# Patient Record
Sex: FEMALE | Race: WHITE | Employment: UNEMPLOYED | URBAN - METROPOLITAN AREA
[De-identification: names, ages, dates, MRNs, and addresses within clinical notes are randomized per-mention and may not be internally consistent; named-entity substitution may affect disease eponyms.]

---

## 2017-05-23 ENCOUNTER — TELEPHONE (OUTPATIENT)
Dept: MIDWIFE SERVICES | Facility: CLINIC | Age: 32
End: 2017-05-23

## 2017-05-23 DIAGNOSIS — O09.30 LATE PRENATAL CARE: Primary | ICD-10-CM

## 2017-05-23 NOTE — TELEPHONE ENCOUNTER
I think that if we can get her in for a fetal survey at our clinic before she is 22wks she can have a ultrasound with us. If there is no availability she should be referred to [facility] for a survey. Yes to the 45 min appointment with the CNM.  robb

## 2017-05-23 NOTE — TELEPHONE ENCOUNTER
Patient called [facility] to set up OB care, but was referred to our clinic as she is late prenatal care. Thinks her LMP was 1/1/17 - 20w1d. Would you like her to do an US at [facility] for late prenatal care? Then 45 minute appointment with midwife?  Ankita Brennan

## 2017-05-24 ENCOUNTER — RADIANT APPOINTMENT (OUTPATIENT)
Dept: ULTRASOUND IMAGING | Facility: CLINIC | Age: 32
End: 2017-05-24
Attending: ADVANCED PRACTICE MIDWIFE
Payer: MEDICAID

## 2017-05-24 DIAGNOSIS — O09.30 LATE PRENATAL CARE: ICD-10-CM

## 2017-05-24 PROCEDURE — 76815 OB US LIMITED FETUS(S): CPT

## 2017-05-25 ENCOUNTER — PRENATAL OFFICE VISIT (OUTPATIENT)
Dept: MIDWIFE SERVICES | Facility: CLINIC | Age: 32
End: 2017-05-25
Payer: MEDICAID

## 2017-05-25 VITALS
OXYGEN SATURATION: 99 % | WEIGHT: 135 LBS | HEART RATE: 111 BPM | DIASTOLIC BLOOD PRESSURE: 76 MMHG | SYSTOLIC BLOOD PRESSURE: 112 MMHG

## 2017-05-25 DIAGNOSIS — Z11.3 SCREENING EXAMINATION FOR VENEREAL DISEASE: Primary | ICD-10-CM

## 2017-05-25 DIAGNOSIS — B00.9 HSV (HERPES SIMPLEX VIRUS) INFECTION: ICD-10-CM

## 2017-05-25 DIAGNOSIS — Z86.32 HISTORY OF GESTATIONAL DIABETES MELLITUS (GDM): ICD-10-CM

## 2017-05-25 DIAGNOSIS — N76.0 BV (BACTERIAL VAGINOSIS): ICD-10-CM

## 2017-05-25 DIAGNOSIS — R30.0 DYSURIA: ICD-10-CM

## 2017-05-25 DIAGNOSIS — F19.21 COMBINATIONS OF DRUG DEPENDENCE EXCLUDING OPIOID TYPE DRUG, IN REMISSION (H): ICD-10-CM

## 2017-05-25 DIAGNOSIS — L30.9 ECZEMA, UNSPECIFIED TYPE: ICD-10-CM

## 2017-05-25 DIAGNOSIS — N89.8 VAGINAL DISCHARGE: ICD-10-CM

## 2017-05-25 DIAGNOSIS — Z34.82 OTHER NORMAL PREGNANCY, NOT FIRST, SECOND TRIMESTER: ICD-10-CM

## 2017-05-25 DIAGNOSIS — Z87.898 HISTORY OF SUBSTANCE USE: ICD-10-CM

## 2017-05-25 DIAGNOSIS — F99 CHRONIC MENTAL ILLNESS: ICD-10-CM

## 2017-05-25 DIAGNOSIS — B96.89 BV (BACTERIAL VAGINOSIS): ICD-10-CM

## 2017-05-25 PROBLEM — Z34.80 OTHER NORMAL PREGNANCY, NOT FIRST: Status: ACTIVE | Noted: 2017-05-25

## 2017-05-25 LAB
ABO + RH BLD: NORMAL
ABO + RH BLD: NORMAL
BLD GP AB SCN SERPL QL: NORMAL
BLOOD BANK CMNT PATIENT-IMP: NORMAL
ERYTHROCYTE [DISTWIDTH] IN BLOOD BY AUTOMATED COUNT: 12.8 % (ref 10–15)
HBA1C MFR BLD: 5.3 % (ref 4.3–6)
HCT VFR BLD AUTO: 38.7 % (ref 35–47)
HGB BLD-MCNC: 13.3 G/DL (ref 11.7–15.7)
MCH RBC QN AUTO: 29.9 PG (ref 26.5–33)
MCHC RBC AUTO-ENTMCNC: 34.4 G/DL (ref 31.5–36.5)
MCV RBC AUTO: 87 FL (ref 78–100)
MICRO REPORT STATUS: ABNORMAL
PLATELET # BLD AUTO: 305 10E9/L (ref 150–450)
RBC # BLD AUTO: 4.45 10E12/L (ref 3.8–5.2)
SPECIMEN EXP DATE BLD: NORMAL
SPECIMEN SOURCE: ABNORMAL
WBC # BLD AUTO: 13.9 10E9/L (ref 4–11)
WET PREP SPEC: ABNORMAL

## 2017-05-25 PROCEDURE — 83036 HEMOGLOBIN GLYCOSYLATED A1C: CPT | Performed by: ADVANCED PRACTICE MIDWIFE

## 2017-05-25 PROCEDURE — 86803 HEPATITIS C AB TEST: CPT | Performed by: ADVANCED PRACTICE MIDWIFE

## 2017-05-25 PROCEDURE — 86901 BLOOD TYPING SEROLOGIC RH(D): CPT | Performed by: ADVANCED PRACTICE MIDWIFE

## 2017-05-25 PROCEDURE — 86780 TREPONEMA PALLIDUM: CPT | Performed by: ADVANCED PRACTICE MIDWIFE

## 2017-05-25 PROCEDURE — 86592 SYPHILIS TEST NON-TREP QUAL: CPT | Performed by: ADVANCED PRACTICE MIDWIFE

## 2017-05-25 PROCEDURE — 87340 HEPATITIS B SURFACE AG IA: CPT | Performed by: ADVANCED PRACTICE MIDWIFE

## 2017-05-25 PROCEDURE — 86850 RBC ANTIBODY SCREEN: CPT | Performed by: ADVANCED PRACTICE MIDWIFE

## 2017-05-25 PROCEDURE — 85027 COMPLETE CBC AUTOMATED: CPT | Performed by: ADVANCED PRACTICE MIDWIFE

## 2017-05-25 PROCEDURE — 87086 URINE CULTURE/COLONY COUNT: CPT | Performed by: ADVANCED PRACTICE MIDWIFE

## 2017-05-25 PROCEDURE — 86593 SYPHILIS TEST NON-TREP QUANT: CPT | Performed by: ADVANCED PRACTICE MIDWIFE

## 2017-05-25 PROCEDURE — 36415 COLL VENOUS BLD VENIPUNCTURE: CPT | Performed by: ADVANCED PRACTICE MIDWIFE

## 2017-05-25 PROCEDURE — 86762 RUBELLA ANTIBODY: CPT | Performed by: ADVANCED PRACTICE MIDWIFE

## 2017-05-25 PROCEDURE — 99207 ZZC FIRST OB VISIT: CPT | Performed by: ADVANCED PRACTICE MIDWIFE

## 2017-05-25 PROCEDURE — 86900 BLOOD TYPING SEROLOGIC ABO: CPT | Performed by: ADVANCED PRACTICE MIDWIFE

## 2017-05-25 PROCEDURE — 81511 FTL CGEN ABNOR FOUR ANAL: CPT | Mod: 90 | Performed by: ADVANCED PRACTICE MIDWIFE

## 2017-05-25 PROCEDURE — 99000 SPECIMEN HANDLING OFFICE-LAB: CPT | Performed by: ADVANCED PRACTICE MIDWIFE

## 2017-05-25 PROCEDURE — 87491 CHLMYD TRACH DNA AMP PROBE: CPT | Performed by: ADVANCED PRACTICE MIDWIFE

## 2017-05-25 PROCEDURE — 87389 HIV-1 AG W/HIV-1&-2 AB AG IA: CPT | Performed by: ADVANCED PRACTICE MIDWIFE

## 2017-05-25 PROCEDURE — 87591 N.GONORRHOEAE DNA AMP PROB: CPT | Performed by: ADVANCED PRACTICE MIDWIFE

## 2017-05-25 PROCEDURE — 87186 SC STD MICRODIL/AGAR DIL: CPT | Performed by: ADVANCED PRACTICE MIDWIFE

## 2017-05-25 PROCEDURE — 87088 URINE BACTERIA CULTURE: CPT | Performed by: ADVANCED PRACTICE MIDWIFE

## 2017-05-25 PROCEDURE — 87210 SMEAR WET MOUNT SALINE/INK: CPT | Performed by: ADVANCED PRACTICE MIDWIFE

## 2017-05-25 PROCEDURE — 86706 HEP B SURFACE ANTIBODY: CPT | Performed by: ADVANCED PRACTICE MIDWIFE

## 2017-05-25 RX ORDER — VALACYCLOVIR HYDROCHLORIDE 500 MG/1
500 TABLET, FILM COATED ORAL 2 TIMES DAILY
Qty: 6 TABLET | Refills: 3 | Status: SHIPPED | OUTPATIENT
Start: 2017-05-25 | End: 2017-10-11

## 2017-05-25 RX ORDER — PRENATAL VIT/IRON FUM/FOLIC AC 27MG-0.8MG
1 TABLET ORAL DAILY
Qty: 100 TABLET | Refills: 3 | Status: SHIPPED | OUTPATIENT
Start: 2017-05-25 | End: 2017-09-22

## 2017-05-25 RX ORDER — METRONIDAZOLE 500 MG/1
500 TABLET ORAL 2 TIMES DAILY
Qty: 14 TABLET | Refills: 0 | Status: ON HOLD | OUTPATIENT
Start: 2017-05-25 | End: 2017-10-25

## 2017-05-25 RX ORDER — TRIAMCINOLONE ACETONIDE 1 MG/G
CREAM TOPICAL
Qty: 80 G | Refills: 0 | Status: SHIPPED | OUTPATIENT
Start: 2017-05-25 | End: 2017-09-22

## 2017-05-25 NOTE — PROGRESS NOTES
Can you call patient and let her know that she does have bacterial vaginosis, and that is probably what is causing her vaginal pain. I sent a prescription for flagyl to her pharmacy.   Thanks,   Emily Bowers CNM CNM
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
## 2017-05-25 NOTE — MR AVS SNAPSHOT
After Visit Summary   5/25/2017    Giselle Mackenzie    MRN: [id number]           Patient Information     Date Of Birth          1985        Visit Information        Provider Department      5/25/2017 11:15 AM Emily Bowers APRN CNM [facility]        Today's Diagnoses     Screening examination for venereal disease    -  1    HSV (herpes simplex virus) infection        Vaginal discharge        History of gestational diabetes mellitus (GDM)        Combinations of drug dependence excluding opioid type drug, in remission (H)        Chronic mental illness        Eczema, unspecified type        History of substance use        BV (bacterial vaginosis)        Other normal pregnancy, not first, second trimester           Follow-ups after your visit        Additional Services     ADDICTION MEDICINE REFERRAL       The Addiction Medicine Service is prepared to provide consultation for and, if necessary, ongoing care for patients with the disease of Addiction.  As defined by the American Society of Addiction Medicine, Addiction is a primary, chronic disease of brain reward, motivation, memory and related circuitry.       Common problems that may warrant referral to the Addiction Medicine Service are:  Alcohol use disorder - diagnosis, treatment referral, acute and protracted withdrawal management, and ongoing medication assisted treatment including Antabuse and Naltrexone.  Opoid Use Disorder - medication assisted treatment including Buprenorphine (Suboxone) or extended release Naltrexone (Vivitrol)  Benzodiazepine dependence - extended outpatient detoxification  Many other issues pertaining to addiction, relapse, and recovery    Referrals to the Addiction Medicine Service assume that the referring provider has discussed the referral with the patient.  Referral will be reviewed and if appropriate, the patient will be contacted to schedule appointment.    Please answer the following  questions so we can better service your patient:    Drug of choice: opoids  Need for detox: No (uncertain. States sober since February but declines drug screen today and is itching all over)  Need for ongoing addiction treatment: Uncertain  Do they have a [facility] PCP:  Yes   Are they willing to participate in a Suboxone group?  Yes    Please bring the following to your appointment:  >>   List of current medications   >>   Any relevant history            MENTAL HEALTH REFERRAL       Your provider has referred you to: [facility]: [facility] - Counseling (Individual/Couples/Family) - [facility] [phone]   [email or web address]   *Patient will be contacted by [facility] scheduling partner, [facility]), to schedule an appointment.  Patients may also call [facility] to schedule.    All scheduling is subject to the client's specific insurance plan & benefits, provider/location availability, and provider clinical specialities.  Please arrive 15 minutes early for your first appointment and bring your completed paperwork.    Please be aware that coverage of these services is subject to the terms and limitations of your health insurance plan.  Call member services at your health plan with any benefit or coverage questions.                  Future tests that were ordered for you today     Open Future Orders        Priority Expected Expires Ordered    US OB > 14 Weeks Complete Single Routine  5/25/2018 5/25/2017    Glucose tolerance gest screen 1 hour Routine  5/25/2018 5/25/2017            Who to contact     If you have questions or need follow up information about today's clinic visit or your schedule please contact [facility] directly at [phone].  Normal or non-critical lab and imaging results will be communicated to you by MyChart, letter or phone within 4 business days after the clinic has received  the results. If you do not hear from us within 7 days, please contact the clinic through [facility] or phone. If you have a critical or abnormal lab result, we will notify you by phone as soon as possible.  Submit refill requests through [facility] or call your pharmacy and they will forward the refill request to us. Please allow 3 business days for your refill to be completed.          Additional Information About Your Visit        [facility] Information     [facility] gives you secure access to your electronic health record. If you see a primary care provider, you can also send messages to your care team and make appointments. If you have questions, please call your primary care clinic.  If you do not have a primary care provider, please call [phone] and they will assist you.        Care EveryWhere ID     This is your Care EveryWhere ID. This could be used by other organizations to access your [facility] medical records  [id number]        Your Vitals Were     Pulse Pulse Oximetry Breastfeeding?             111 99% No          Blood Pressure from Last 3 Encounters:   05/25/17 112/76   01/16/16 (!) 144/103   08/12/13 120/78    Weight from Last 3 Encounters:   05/25/17 135 lb (61.2 kg)              We Performed the Following     ABO/Rh type and screen     ADDICTION MEDICINE REFERRAL     Anti Treponema     CBC with platelets     Chlamydia trachomatis PCR     Hemoglobin A1c     Hepatitis B Surface Antibody     Hepatitis B surface antigen     Hepatitis C antibody     HIV Antigen Antibody Combo     Maternal quad screen     MENTAL HEALTH REFERRAL     Neisseria gonorrhoeae PCR     Rubella Antibody IgG Quantitative     Urine Culture Aerobic Bacterial     Wet prep          Today's Medication Changes          These changes are accurate as of: 5/25/17  2:34 PM.  If you have any questions, ask your nurse or doctor.               Start taking these medicines.        Dose/Directions    metroNIDAZOLE 500 MG tablet   Commonly known as:   FLAGYL   Used for:  BV (bacterial vaginosis)   Started by:  Emily Bowers APRN CNM        Dose:  500 mg   Take 1 tablet (500 mg) by mouth 2 times daily   Quantity:  14 tablet   Refills:  0       prenatal multivitamin  plus iron 27-0.8 MG Tabs per tablet   Started by:  Emily Bowers APRN CNM        Dose:  1 tablet   Take 1 tablet by mouth daily   Quantity:  100 tablet   Refills:  3       triamcinolone 0.1 % cream   Commonly known as:  KENALOG   Used for:  Eczema, unspecified type   Started by:  Emily Bowers APRN CNM        Apply sparingly to affected area three times daily as needed   Quantity:  80 g   Refills:  0         These medicines have changed or have updated prescriptions.        Dose/Directions    * valACYclovir 1000 mg tablet   Commonly known as:  VALTREX   This may have changed:  Another medication with the same name was added. Make sure you understand how and when to take each.        Dose:  1000 mg   Take 1 tablet (1,000 mg) by mouth 2 times daily for 10 days   Quantity:  20 tablet   Refills:  0       * valACYclovir 500 MG tablet   Commonly known as:  VALTREX   This may have changed:  You were already taking a medication with the same name, and this prescription was added. Make sure you understand how and when to take each.   Used for:  HSV (herpes simplex virus) infection   Changed by:  Emily Bowers APRN CNM        Dose:  500 mg   Take 1 tablet (500 mg) by mouth 2 times daily   Quantity:  6 tablet   Refills:  3       * Notice:  This list has 2 medication(s) that are the same as other medications prescribed for you. Read the directions carefully, and ask your doctor or other care provider to review them with you.         Where to get your medicines      These medications were sent to [facility] [place], [place] - [address]  [address], [facility] [address]     Phone:  [phone]     metroNIDAZOLE 500 MG tablet    prenatal  multivitamin  plus iron 27-0.8 MG Tabs per tablet    triamcinolone 0.1 % cream    valACYclovir 500 MG tablet                Primary Care Provider Office Phone # Fax #    Mary Carter PA-C [phone]       [address] [address]        Thank you!     Thank you for choosing [facility]  for your care. Our goal is always to provide you with excellent care. Hearing back from our patients is one way we can continue to improve our services. Please take a few minutes to complete the written survey that you may receive in the mail after your visit with us. Thank you!             Your Updated Medication List - Protect others around you: Learn how to safely use, store and throw away your medicines at www.disposemymeds.org.          This list is accurate as of: 5/25/17  2:34 PM.  Always use your most recent med list.                   Brand Name Dispense Instructions for use    DESITIN Oint     99 g    Apply to sore areas externally 3 times a day.       metroNIDAZOLE 500 MG tablet    FLAGYL    14 tablet    Take 1 tablet (500 mg) by mouth 2 times daily       prenatal multivitamin  plus iron 27-0.8 MG Tabs per tablet     100 tablet    Take 1 tablet by mouth daily       triamcinolone 0.1 % cream    KENALOG    80 g    Apply sparingly to affected area three times daily as needed       * valACYclovir 1000 mg tablet    VALTREX    20 tablet    Take 1 tablet (1,000 mg) by mouth 2 times daily for 10 days       * valACYclovir 500 MG tablet    VALTREX    6 tablet    Take 1 tablet (500 mg) by mouth 2 times daily       * Notice:  This list has 2 medication(s) that are the same as other medications prescribed for you. Read the directions carefully, and ask your doctor or other care provider to review them with you.

## 2017-05-26 ENCOUNTER — TELEPHONE (OUTPATIENT)
Dept: MIDWIFE SERVICES | Facility: CLINIC | Age: 32
End: 2017-05-26

## 2017-05-26 DIAGNOSIS — B00.9 HSV (HERPES SIMPLEX VIRUS) INFECTION: ICD-10-CM

## 2017-05-26 DIAGNOSIS — O98.112 SYPHILIS AFFECTING PREGNANCY IN SECOND TRIMESTER: Primary | ICD-10-CM

## 2017-05-26 DIAGNOSIS — O09.629 SUPERVISION OF HIGH-RISK PREGNANCY OF YOUNG MULTIGRAVIDA: ICD-10-CM

## 2017-05-26 LAB
C TRACH DNA SPEC QL NAA+PROBE: NORMAL
HBV SURFACE AB SERPL IA-ACNC: 491.84 M[IU]/ML
HBV SURFACE AG SERPL QL IA: NONREACTIVE
HIV 1+2 AB+HIV1 P24 AG SERPL QL IA: NORMAL
N GONORRHOEA DNA SPEC QL NAA+PROBE: NORMAL
RPR SER QL: REACTIVE
RPR SER-TITR: 4 {TITER}
RUBV IGG SERPL IA-ACNC: 22 IU/ML
SPECIMEN SOURCE: NORMAL
SPECIMEN SOURCE: NORMAL
T PALLIDUM IGG+IGM SER QL: ABNORMAL

## 2017-05-26 NOTE — TELEPHONE ENCOUNTER
"Yep. I expected this. She states that she had syphillis last year but does not remember treating it. Was also positive for 3 other STIs at the time. \"They gave me a shot which she thought was for gonorrhea (but this could have been PCN for the syphillis), and 4 pills for chlamydia or trich and a prescription for something that I never picked up\". \"I dont remember taking peniccilin\" and \"The department of health keeps trying to contact me\" . The testing and treatment was last year at the [facility]. Also had a chancroid at the time. Can you route this to the on call CNM, as I am home sick, So the best treatment can be determined and facilitated. Thanks,  Emily  "

## 2017-05-27 RX ORDER — NITROFURANTOIN 25; 75 MG/1; MG/1
100 CAPSULE ORAL 2 TIMES DAILY
Qty: 14 CAPSULE | Refills: 0 | Status: SHIPPED | OUTPATIENT
Start: 2017-05-27 | End: 2017-09-22

## 2017-05-28 LAB
BACTERIA SPEC CULT: ABNORMAL
MICRO REPORT STATUS: ABNORMAL
MICROORGANISM SPEC CULT: ABNORMAL
SPECIMEN SOURCE: ABNORMAL

## 2017-05-28 NOTE — PROGRESS NOTES
Dear Giselle,    Your urine test results show that you have a bladder infection. I am prescribed you antibiotics to cure it. I sent this prescription to your pharmacy- [facility]. Please pick this medication up and take them twice a day for one week. Bladder infections can be dangerous in pregnancy if they are not treated.  If you have any questions, please contact me via [employer] or you can call our office at [phone].    Emily Trinidad CNM, CNM

## 2017-05-29 LAB
# FETUSES US: NORMAL
AFP ADJ MOM AMN: 0.82
AFP SERPL-MCNC: 27 NG/ML
AGE - REPORTED: 33.2
DATING METHOD: NORMAL
DIABETIC AT CONCEPTION: NO
FAMILY MEMBER DISEASES HX: NO
FAMILY MEMBER DISEASES HX: NO
GA METHOD: NORMAL
GA: 15.57 WK
HCG MOM SERPL: 0.65
HCG SERPL-ACNC: NORMAL M[IU]/ML
HX OF HEREDITARY DISORDERS: NO
IDDM PATIENT QL: NO
INHIBIN A MOM SERPL: 1.25
INHIBIN A SERPL-MCNC: 231
INTEGRATED SCN PATIENT-IMP: NORMAL
LMP START DATE: NORMAL
PATHOLOGY STUDY: NORMAL
PREV HX CHROMOSOME ABNORMALITY: NO
SPECIMEN DRAWN SERPL: NORMAL
TWINS: NORMAL
U ESTRIOL MOM SERPL: 0.75
U ESTRIOL SERPL-MCNC: 0.6 NG/ML

## 2017-05-30 DIAGNOSIS — Z34.80 OTHER NORMAL PREGNANCY, NOT FIRST: Primary | ICD-10-CM

## 2017-05-30 LAB — HCV AB SERPL QL IA: ABNORMAL
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
## 2017-06-01 ENCOUNTER — TELEPHONE (OUTPATIENT)
Dept: ADDICTION MEDICINE | Facility: CLINIC | Age: 32
End: 2017-06-01

## 2017-06-01 NOTE — TELEPHONE ENCOUNTER
----- Message from Pina Thomas sent at 5/30/2017  7:44 AM CDT -----  Regarding: Addiction Med Referral  Please review.

## 2017-06-01 NOTE — TELEPHONE ENCOUNTER
Please schedule appointment for patient with Addiction Medicine provider for pregnanacy/possible suboxone.  It appears from recent notes phone may not be correct.  If tried may want to try My chart as well.

## 2017-06-02 NOTE — TELEPHONE ENCOUNTER
Pt is scheduled to see Dr Amanda 06/12/17. Writer is closing this encounter.    Che Fuentes

## 2017-06-21 ENCOUNTER — TELEPHONE (OUTPATIENT)
Dept: MIDWIFE SERVICES | Facility: CLINIC | Age: 32
End: 2017-06-21

## 2017-06-21 NOTE — TELEPHONE ENCOUNTER
Certified letter ([phone]) sent to pt today 06/21/2017, to let her know that we have been trying to reach her via telephone to discuss her abnormal lab results and her need for appts and referrals as discussed at her last appt with Emily.

## 2017-07-17 ENCOUNTER — TELEPHONE (OUTPATIENT)
Dept: MATERNAL FETAL MEDICINE | Facility: CLINIC | Age: 32
End: 2017-07-17

## 2017-07-17 NOTE — TELEPHONE ENCOUNTER
MFM received referral from [facility] for u/s and consult.  Schedulers have tried to contact patient several times, messages were left, and no return call from patient.  Removing orders.  Referring clinic notified.     Emily Avila

## 2017-08-06 ENCOUNTER — MYC MEDICAL ADVICE (OUTPATIENT)
Dept: MIDWIFE SERVICES | Facility: CLINIC | Age: 32
End: 2017-08-06

## 2017-08-06 DIAGNOSIS — O09.529 SUPERVISION OF HIGH-RISK PREGNANCY OF ELDERLY MULTIGRAVIDA: Primary | ICD-10-CM

## 2017-08-07 ENCOUNTER — PRE VISIT (OUTPATIENT)
Dept: MATERNAL FETAL MEDICINE | Facility: CLINIC | Age: 32
End: 2017-08-07

## 2017-08-07 NOTE — TELEPHONE ENCOUNTER
"TC to pt to let her know that she will need to see MDs for her pregnancy due to high risk.  Attempted to explain why the midwives cannot see her, but her phone was getting very muffled and I heard her say something about \"I know I have the right to choose my medical care and who I see\" and then the call was disconnected.  Pt is currently scheduled to see Dr. Daly on 8/22, unsure of whether she will come to that appointment.  Cancelling CNM visit on 8/22/17.  Bev Be RN      "

## 2017-08-10 ENCOUNTER — HOSPITAL ENCOUNTER (OUTPATIENT)
Dept: ULTRASOUND IMAGING | Facility: CLINIC | Age: 32
Discharge: HOME | End: 2017-08-10
Attending: ADVANCED PRACTICE MIDWIFE | Admitting: ADVANCED PRACTICE MIDWIFE
Payer: MEDICAID

## 2017-08-10 ENCOUNTER — OFFICE VISIT (OUTPATIENT)
Dept: MATERNAL FETAL MEDICINE | Facility: CLINIC | Age: 32
End: 2017-08-10
Attending: ADVANCED PRACTICE MIDWIFE
Payer: MEDICAID

## 2017-08-10 DIAGNOSIS — O09.32 LATE PRENATAL CARE AFFECTING PREGNANCY IN SECOND TRIMESTER: Primary | ICD-10-CM

## 2017-08-10 DIAGNOSIS — O26.90 PREGNANCY RELATED CONDITION, UNSPECIFIED TRIMESTER: ICD-10-CM

## 2017-08-10 DIAGNOSIS — O35.10X0 SUSPECTED CHROMOSOME ANOMALY OF FETUS AFFECTING MANAGEMENT OF MOTHER, ANTEPARTUM, NOT APPLICABLE OR UNSPECIFIED FETUS: ICD-10-CM

## 2017-08-10 PROCEDURE — 76811 OB US DETAILED SNGL FETUS: CPT

## 2017-08-10 NOTE — MR AVS SNAPSHOT
After Visit Summary   8/10/2017    Giselle Mackenzie    MRN: [id number]           Patient Information     Date Of Birth          1985        Visit Information        Provider Department      8/10/2017 12:15 PM Liza Ha MD [facility] Maternal Fetal Medicine [facility]        Today's Diagnoses     Late prenatal care affecting pregnancy in second trimester    -  1    Suspected chromosome anomaly of fetus affecting management of mother, antepartum, not applicable or unspecified fetus           Follow-ups after your visit        Your next 10 appointments already scheduled     Aug 22, 2017  3:30 PM CDT   MyChart OB Short with JESSICA Henderson CNM   [facility]    [address] [place] [address] [address]  [facility] [address] [phone]            Aug 22, 2017  4:00 PM CDT   ESTABLISHED PRENATAL with Tahira Daly MD   [facility]    [address] [place] [address] [address]  [facility] [address] [phone]              Who to contact     If you have questions or need follow up information about today's clinic visit or your schedule please contact [facility] MATERNAL FETAL MEDICINE [facility] directly at [phone].  Normal or non-critical lab and imaging results will be communicated to you by MyChart, letter or phone within 4 business days after the clinic has received the results. If you do not hear from us within 7 days, please contact the clinic through ElephantTalk Communicationshart or phone. If you have a critical or abnormal lab result, we will notify you by phone as soon as possible.  Submit refill requests through [employer] or call your pharmacy and they will forward the refill request to us. Please allow 3 business days for your refill to be completed.          Additional Information About Your Visit        MyChart Information     [employer] gives you secure access to your electronic health record. If you see a  primary care provider, you can also send messages to your care team and make appointments. If you have questions, please call your primary care clinic.  If you do not have a primary care provider, please call [phone] and they will assist you.        Care EveryWhere ID     This is your Care EveryWhere ID. This could be used by other organizations to access your [place] medical records  [id number]         Blood Pressure from Last 3 Encounters:   06/12/17 128/78   05/25/17 112/76   01/16/16 (!) 144/103    Weight from Last 3 Encounters:   06/12/17 59.4 kg (131 lb)   05/25/17 61.2 kg (135 lb)              Today, you had the following     No orders found for display       Primary Care Provider Office Phone # Fax #    Mary Carter PA-C [phone]       [address] [address]        Equal Access to Services     SHYANN SANDHU : Hadii aad ku hadasho Soomaali, waaxda luqadaha, qaybta kaalmada adeegyada, waxay roquein hayjuliusn kaitlynn bernabe . So [facility] [phone].    ATENCIÓN: Si habla español, tiene a lewis disposición servicios gratuitos de asistencia lingüística. Harry al [phone].    We comply with applicable federal civil rights laws and [place] laws. We do not discriminate on the basis of race, color, national origin, age, disability sex, sexual orientation or gender identity.            Thank you!     Thank you for choosing [facility]  for your care. Our goal is always to provide you with excellent care. Hearing back from our patients is one way we can continue to improve our services. Please take a few minutes to complete the written survey that you may receive in the mail after your visit with us. Thank you!             Your Updated Medication List - Protect others around you: Learn how to safely use, store and throw away your medicines at www.disposemymeds.org.          This list is accurate as of: 8/10/17  1:33 PM.  Always use  your most recent med list.                   Brand Name Dispense Instructions for use Diagnosis    DESITIN Oint     99 g    Apply to sore areas externally 3 times a day.        metroNIDAZOLE 500 MG tablet    FLAGYL    14 tablet    Take 1 tablet (500 mg) by mouth 2 times daily    BV (bacterial vaginosis)       nitroFURantoin (macrocrystal-monohydrate) 100 MG capsule    MACROBID    14 capsule    Take 1 capsule (100 mg) by mouth 2 times daily    Dysuria       prenatal multivitamin plus iron 27-0.8 MG Tabs per tablet     100 tablet    Take 1 tablet by mouth daily        triamcinolone 0.1 % cream    KENALOG    80 g    Apply sparingly to affected area three times daily as needed    Eczema, unspecified type       valACYclovir 500 MG tablet    VALTREX    6 tablet    Take 1 tablet (500 mg) by mouth 2 times daily    HSV (herpes simplex virus) infection

## 2017-08-12 ENCOUNTER — HEALTH MAINTENANCE LETTER (OUTPATIENT)
Age: 32
End: 2017-08-12

## 2017-09-22 ENCOUNTER — PRENATAL OFFICE VISIT (OUTPATIENT)
Dept: OBGYN | Facility: CLINIC | Age: 32
End: 2017-09-22
Payer: MEDICAID

## 2017-09-22 VITALS
HEIGHT: 61 IN | DIASTOLIC BLOOD PRESSURE: 87 MMHG | HEART RATE: 105 BPM | BODY MASS INDEX: 33.29 KG/M2 | SYSTOLIC BLOOD PRESSURE: 147 MMHG | OXYGEN SATURATION: 97 % | TEMPERATURE: 97.6 F | WEIGHT: 176.3 LBS

## 2017-09-22 DIAGNOSIS — L30.9 ECZEMA, UNSPECIFIED TYPE: ICD-10-CM

## 2017-09-22 DIAGNOSIS — O99.613 CONSTIPATION DURING PREGNANCY, THIRD TRIMESTER: ICD-10-CM

## 2017-09-22 DIAGNOSIS — K59.00 CONSTIPATION DURING PREGNANCY, THIRD TRIMESTER: ICD-10-CM

## 2017-09-22 DIAGNOSIS — Z23 NEED FOR PROPHYLACTIC VACCINATION AND INOCULATION AGAINST INFLUENZA: ICD-10-CM

## 2017-09-22 DIAGNOSIS — O09.629 SUPERVISION OF HIGH-RISK PREGNANCY OF YOUNG MULTIGRAVIDA: Primary | ICD-10-CM

## 2017-09-22 DIAGNOSIS — O23.41: ICD-10-CM

## 2017-09-22 DIAGNOSIS — B18.2 HEPATITIS C VIRUS CARRIER STATE (H): ICD-10-CM

## 2017-09-22 LAB
AMPHETAMINES UR QL SCN: NEGATIVE
AMPHETAMINES UR QL: ABNORMAL NG/ML
BARBITURATES UR QL SCN: ABNORMAL NG/ML
BENZODIAZ UR QL SCN: ABNORMAL NG/ML
BUPRENORPHINE UR QL: ABNORMAL NG/ML
CANNABINOIDS UR QL: ABNORMAL NG/ML
CANNABINOIDS UR QL: NEGATIVE
COCAINE UR QL SCN: ABNORMAL NG/ML
COCAINE UR QL: NEGATIVE
D-METHAMPHET UR QL: ABNORMAL NG/ML
METHADONE UR QL SCN: ABNORMAL NG/ML
OPIATES UR QL SCN: ABNORMAL
OPIATES UR QL SCN: ABNORMAL NG/ML
OXYCODONE UR QL SCN: ABNORMAL NG/ML
PCP UR QL SCN: ABNORMAL NG/ML
PCP UR QL SCN: NEGATIVE
PROPOXYPH UR QL: ABNORMAL NG/ML
TRICYCLICS UR QL SCN: ABNORMAL NG/ML

## 2017-09-22 PROCEDURE — 90686 IIV4 VACC NO PRSV 0.5 ML IM: CPT | Performed by: OBSTETRICS & GYNECOLOGY

## 2017-09-22 PROCEDURE — 36415 COLL VENOUS BLD VENIPUNCTURE: CPT | Performed by: OBSTETRICS & GYNECOLOGY

## 2017-09-22 PROCEDURE — 86593 SYPHILIS TEST NON-TREP QUANT: CPT | Performed by: OBSTETRICS & GYNECOLOGY

## 2017-09-22 PROCEDURE — 99213 OFFICE O/P EST LOW 20 MIN: CPT | Mod: 25 | Performed by: OBSTETRICS & GYNECOLOGY

## 2017-09-22 PROCEDURE — 87522 HEPATITIS C REVRS TRNSCRPJ: CPT | Performed by: OBSTETRICS & GYNECOLOGY

## 2017-09-22 PROCEDURE — 87086 URINE CULTURE/COLONY COUNT: CPT | Performed by: OBSTETRICS & GYNECOLOGY

## 2017-09-22 PROCEDURE — 90471 IMMUNIZATION ADMIN: CPT | Performed by: OBSTETRICS & GYNECOLOGY

## 2017-09-22 PROCEDURE — 87088 URINE BACTERIA CULTURE: CPT | Performed by: OBSTETRICS & GYNECOLOGY

## 2017-09-22 PROCEDURE — 80361 OPIATES 1 OR MORE: CPT | Mod: 90 | Performed by: OBSTETRICS & GYNECOLOGY

## 2017-09-22 PROCEDURE — 80307 DRUG TEST PRSMV CHEM ANLYZR: CPT | Performed by: OBSTETRICS & GYNECOLOGY

## 2017-09-22 PROCEDURE — 87186 SC STD MICRODIL/AGAR DIL: CPT | Performed by: OBSTETRICS & GYNECOLOGY

## 2017-09-22 PROCEDURE — 99000 SPECIMEN HANDLING OFFICE-LAB: CPT | Performed by: OBSTETRICS & GYNECOLOGY

## 2017-09-22 RX ORDER — PRENATAL VIT/IRON FUM/FOLIC AC 27MG-0.8MG
1 TABLET ORAL DAILY
Qty: 100 TABLET | Refills: 3 | Status: SHIPPED | OUTPATIENT
Start: 2017-09-22 | End: 2018-03-05

## 2017-09-22 RX ORDER — HYDROXYZINE PAMOATE 50 MG/1
50 CAPSULE ORAL
Qty: 30 CAPSULE | Refills: 3 | Status: SHIPPED | OUTPATIENT
Start: 2017-09-22 | End: 2017-12-07

## 2017-09-22 RX ORDER — TRIAMCINOLONE ACETONIDE 1 MG/G
CREAM TOPICAL
Qty: 80 G | Refills: 0 | Status: ON HOLD | OUTPATIENT
Start: 2017-09-22 | End: 2019-02-27

## 2017-09-22 RX ORDER — BISACODYL 10 MG
10 SUPPOSITORY, RECTAL RECTAL DAILY PRN
Qty: 25 SUPPOSITORY | Refills: 1 | Status: SHIPPED | OUTPATIENT
Start: 2017-09-22 | End: 2018-01-08

## 2017-09-22 ASSESSMENT — PATIENT HEALTH QUESTIONNAIRE - PHQ9: SUM OF ALL RESPONSES TO PHQ QUESTIONS 1-9: 4

## 2017-09-22 NOTE — PROGRESS NOTES
"S:  Giselle presents for prenatal care. She has not been seen by this clinic since May and we have not been able to reach her in spite of many attempts.   She reports she was just in treatment at [facility], [facility]. She states she was there for five weeks then got discharged to [facility], where she is now staying. She is getting methadone. 90 once a day from [facility].     Doing methadone program at [facility]. Has labs from  at [facility].  No Care Everywhere records under [facility] recently. When this was discussed she stated she was admitted the whole time under her sister's name (Sister's name is Allison Babb. 87). Records requested. States she was having ultrasounds at [facility]. Doesn't think she ever had a growth ultrasound. Last one here was 26 weeks.     Sober x 2 months currently.     Flu shot today.   Constipated. Had a big regimen while in treatment.     Aware she has had syphilis and Hep C in the past.     O:  Vitals:    17 1450   BP: 147/87   BP Location: Left arm   Patient Position: Sitting   Cuff Size: Adult Regular   Pulse: 105   Temp: 97.6  F (36.4  C)   TempSrc: Oral   SpO2: 97%   Weight: 176 lb 4.8 oz (80 kg)   Height: 5' 1\" (1.549 m)     Gen: well appearing, wearing a bathing suit.   Abd: soft, NT  CV: no edema  Psych: affect appropriate. Good eye contact.     A/P:  32 year old  with   1. Scant prenatal care  - States she had TDaP at [facility]. Records requested  - Urine culture today, E. Coli UTI first trimester never treated.   - Flu shot today.   - Needs glucose test. Random glucose at [facility]. Declines today.   - Measuring large for dates. Ultrasound ordered. Breech today.     2. H/o polysubstance abuse  - In treatment now  - UDS today    3. History of syphilis  - Discussed with ID attending on call.  - As titer dropped significantly from Oct 2016 (Care Everywhere) to may 2017, she is considered treated.     4. Hep C Ab positive  - No recent RNA quant, done today. "     5. Elevated BP  - Will get PIH labs if elevated again next visit.     6. Constipation  - started dulcolax and metamucil, which she states worked well for her inpatient.     7. H/o HSV  - Start prophylaxis at 36 weeks

## 2017-09-22 NOTE — MR AVS SNAPSHOT
After Visit Summary   9/22/2017    Giselle Mackenzie    MRN: [id number]           Patient Information     Date Of Birth          1985        Visit Information        Provider Department      9/22/2017 2:45 PM Racquel Rangel MD [facility]        Today's Diagnoses     Supervision of high-risk pregnancy of young multigravida    -  1    Infection of urinary tract in pregnancy, first trimester        Constipation during pregnancy, third trimester        Eczema, unspecified type        Need for prophylactic vaccination and inoculation against influenza        Hepatitis C virus carrier state (H)        LGA (large for gestational age) infant           Follow-ups after your visit        Additional Services     MAT FETAL MED CTR REFERRAL-PREGNANCY       >> Patient may proceed with recommendations for further testing as directed by the Maternal Fetal Medicine Specialist >>    >> If requesting Fetal Echo: MFM will determine appropriate location for exam due to indication.    >> If requesting Lung Maturity Amnio:  If results indicate fetal lung maturity, induction or C/S is recommended within 36 hours.  Please schedule accordingly.     Dear Patient:   Please be aware that coverage of these services is subject to the terms and limitations of your health insurance plan.  Call member services at your health plan with any benefit or coverage questions.      Please bring the following to your appointment:    >>  Any x-rays, CTs or MRIs which have been performed.  Contact the facility where they were done to arrange for  prior to your scheduled appointment.  Any new CT, MRI or other procedures ordered by your specialist must be performed at a [place] facility or coordinated by your clinic's referral office.  >>  List of current medications   >>  This referral request   >>  Any documents/labs given to you for this referral                  Future tests that were ordered for you today   "   Open Future Orders        Priority Expected Expires Ordered    Glucose tolerance gest screen 1 hour Routine  12/21/2017 9/22/2017    OB hemoglobin Routine  12/21/2017 9/22/2017            Who to contact     If you have questions or need follow up information about today's clinic visit or your schedule please contact [facility] directly at [phone].  Normal or non-critical lab and imaging results will be communicated to you by Executive Caddiehart, letter or phone within 4 business days after the clinic has received the results. If you do not hear from us within 7 days, please contact the clinic through [employer] or phone. If you have a critical or abnormal lab result, we will notify you by phone as soon as possible.  Submit refill requests through [employer] or call your pharmacy and they will forward the refill request to us. Please allow 3 business days for your refill to be completed.          Additional Information About Your Visit        Executive CaddieharEarthmill Information     [employer] gives you secure access to your electronic health record. If you see a primary care provider, you can also send messages to your care team and make appointments. If you have questions, please call your primary care clinic.  If you do not have a primary care provider, please call [phone] and they will assist you.        Care EveryWhere ID     This is your Care EveryWhere ID. This could be used by other organizations to access your [place] medical records  [id number]        Your Vitals Were     Pulse Temperature Height Pulse Oximetry BMI (Body Mass Index)       105 97.6  F (36.4  C) (Oral) 5' 1\" (1.549 m) 97% 33.31 kg/m2        Blood Pressure from Last 3 Encounters:   09/22/17 147/87   06/12/17 128/78   05/25/17 112/76    Weight from Last 3 Encounters:   09/22/17 176 lb 4.8 oz (80 kg)   06/12/17 131 lb (59.4 kg)   05/25/17 135 lb (61.2 kg)              We Performed the Following     Drug Abuse Screen Panel 13, Urine (Pain Care Package) "     FLU VAC, SPLIT VIRUS IM > 3 YO (QUADRIVALENT) [24650]     Hepatitis C RNA, quantitative     MAT FETAL MED CTR REFERRAL-PREGNANCY     RPR with titer to monitor     Urine Culture Aerobic Bacterial     Vaccine Administration, Initial [41050]          Today's Medication Changes          These changes are accurate as of: 9/22/17  9:00 PM.  If you have any questions, ask your nurse or doctor.               Start taking these medicines.        Dose/Directions    bisacodyl 10 MG Suppository   Commonly known as:  DULCOLAX   Used for:  Constipation during pregnancy, third trimester   Started by:  Racquel Rangel MD        Dose:  10 mg   Place 1 suppository (10 mg) rectally daily as needed for constipation   Quantity:  25 suppository   Refills:  1       hydrOXYzine 50 MG capsule   Commonly known as:  VISTARIL   Used for:  Supervision of high-risk pregnancy of young multigravida   Started by:  Racquel Rangel MD        Dose:  50 mg   Take 1 capsule (50 mg) by mouth nightly as needed (sleep)   Quantity:  30 capsule   Refills:  3       psyllium 0.52 G capsule   Used for:  Constipation during pregnancy, third trimester   Started by:  Racquel Rangel MD        Dose:  1 capsule   Take 1 capsule (0.52 g) by mouth daily   Quantity:  90 capsule   Refills:  3            Where to get your medicines      These medications were sent to [facility] [address]  [address] [address]     Phone:  [phone]     bisacodyl 10 MG Suppository    hydrOXYzine 50 MG capsule    prenatal multivitamin plus iron 27-0.8 MG Tabs per tablet    psyllium 0.52 G capsule    triamcinolone 0.1 % cream                Primary Care Provider Office Phone # Fax #    Mary Carter PA-C [phone]       [address] [facility] [address]        Equal Access to Services     SHYANN SANDHU AH: tay Varner qaybta  hafsa fonseca jamieosvaldo gabrielsaurabh bernabe ah. So [facility] [phone].    ATENCIÓN: Si diane kim, tiene a lewis disposición servicios gratuitos de asistencia lingüística. Harry al [phone].    We comply with applicable federal civil rights laws and [place] laws. We do not discriminate on the basis of race, color, national origin, age, disability sex, sexual orientation or gender identity.            Thank you!     Thank you for choosing [facility]  for your care. Our goal is always to provide you with excellent care. Hearing back from our patients is one way we can continue to improve our services. Please take a few minutes to complete the written survey that you may receive in the mail after your visit with us. Thank you!             Your Updated Medication List - Protect others around you: Learn how to safely use, store and throw away your medicines at www.disposemymeds.org.          This list is accurate as of: 9/22/17  9:00 PM.  Always use your most recent med list.                   Brand Name Dispense Instructions for use Diagnosis    bisacodyl 10 MG Suppository    DULCOLAX    25 suppository    Place 1 suppository (10 mg) rectally daily as needed for constipation    Constipation during pregnancy, third trimester       DESITIN Oint     99 g    Apply to sore areas externally 3 times a day.        hydrOXYzine 50 MG capsule    VISTARIL    30 capsule    Take 1 capsule (50 mg) by mouth nightly as needed (sleep)    Supervision of high-risk pregnancy of young multigravida       metroNIDAZOLE 500 MG tablet    FLAGYL    14 tablet    Take 1 tablet (500 mg) by mouth 2 times daily    BV (bacterial vaginosis)       prenatal multivitamin plus iron 27-0.8 MG Tabs per tablet     100 tablet    Take 1 tablet by mouth daily    Supervision of high-risk pregnancy of young multigravida       psyllium 0.52 G capsule     90 capsule    Take 1 capsule (0.52 g) by mouth daily    Constipation during  pregnancy, third trimester       triamcinolone 0.1 % cream    KENALOG    80 g    Apply sparingly to affected area three times daily as needed    Eczema, unspecified type       valACYclovir 500 MG tablet    VALTREX    6 tablet    Take 1 tablet (500 mg) by mouth 2 times daily    HSV (herpes simplex virus) infection

## 2017-09-22 NOTE — PROGRESS NOTES
Injectable Influenza Immunization Documentation    1.  Is the person to be vaccinated sick today?   No    2. Does the person to be vaccinated have an allergy to a component   of the vaccine?   No    3. Has the person to be vaccinated ever had a serious reaction   to influenza vaccine in the past?   No    4. Has the person to be vaccinated ever had Guillain-Barré syndrome?   No    Form completed by Irena Nunez

## 2017-09-23 LAB — RPR SER-TITR: 2 {TITER}

## 2017-09-24 LAB
BACTERIA SPEC CULT: ABNORMAL
SPECIMEN SOURCE: ABNORMAL

## 2017-09-24 RX ORDER — CEPHALEXIN 500 MG/1
500 CAPSULE ORAL 3 TIMES DAILY
Qty: 21 CAPSULE | Refills: 0 | Status: SHIPPED | OUTPATIENT
Start: 2017-09-24 | End: 2017-10-01

## 2017-09-24 NOTE — PROGRESS NOTES
Please inform patient she still has a UTI.   I am sending antibiotics to [place].   She is at [facility]: [phone]  She does not have Wavo.mehart access currently

## 2017-09-25 LAB
HCV RNA SERPL NAA+PROBE-ACNC: 79 [IU]/ML
HCV RNA SERPL NAA+PROBE-LOG IU: 1.9 LOG IU/ML

## 2017-09-26 ENCOUNTER — HOSPITAL ENCOUNTER (OUTPATIENT)
Dept: ULTRASOUND IMAGING | Facility: CLINIC | Age: 32
Discharge: HOME | End: 2017-09-26
Attending: OBSTETRICS & GYNECOLOGY | Admitting: OBSTETRICS & GYNECOLOGY
Payer: MEDICAID

## 2017-09-26 ENCOUNTER — OFFICE VISIT (OUTPATIENT)
Dept: MATERNAL FETAL MEDICINE | Facility: CLINIC | Age: 32
End: 2017-09-26
Attending: OBSTETRICS & GYNECOLOGY
Payer: MEDICAID

## 2017-09-26 DIAGNOSIS — O99.320 METHADONE MAINTENANCE TREATMENT AFFECTING PREGNANCY, ANTEPARTUM (H): Primary | ICD-10-CM

## 2017-09-26 DIAGNOSIS — O26.90 PREGNANCY RELATED CONDITION, UNSPECIFIED TRIMESTER: ICD-10-CM

## 2017-09-26 DIAGNOSIS — F11.20 METHADONE MAINTENANCE TREATMENT AFFECTING PREGNANCY, ANTEPARTUM (H): Primary | ICD-10-CM

## 2017-09-26 LAB
6MAM SERPL-MCNC: NEGATIVE NG/ML
CODEINE UR CFM-MCNC: NEGATIVE NG/ML
MORPHINE UR CFM-MCNC: 516 NG/ML

## 2017-09-26 PROCEDURE — 76816 OB US FOLLOW-UP PER FETUS: CPT

## 2017-09-26 NOTE — MR AVS SNAPSHOT
After Visit Summary   9/26/2017    Giselle Mackenzie    MRN: [id number]           Patient Information     Date Of Birth          1985        Visit Information        Provider Department      9/26/2017 2:45 PM Faisal Min MD [facility] Maternal Fetal Medicine [facility]        Today's Diagnoses     Methadone maintenance treatment affecting pregnancy, antepartum (H)    -  1       Follow-ups after your visit        Future tests that were ordered for you today     Open Future Orders        Priority Expected Expires Ordered    [facility] BPP Single Routine 10/3/2017 7/26/2018 9/26/2017    MF US Comprehensive Single F/U Routine  7/25/2018 9/25/2017            Who to contact     If you have questions or need follow up information about today's clinic visit or your schedule please contact [facility] MATERNAL FETAL MEDICINE [facility] directly at [phone].  Normal or non-critical lab and imaging results will be communicated to you by App.nethart, letter or phone within 4 business days after the clinic has received the results. If you do not hear from us within 7 days, please contact the clinic through App.nethart or phone. If you have a critical or abnormal lab result, we will notify you by phone as soon as possible.  Submit refill requests through [employer] or call your pharmacy and they will forward the refill request to us. Please allow 3 business days for your refill to be completed.          Additional Information About Your Visit        MyChart Information     [employer] gives you secure access to your electronic health record. If you see a primary care provider, you can also send messages to your care team and make appointments. If you have questions, please call your primary care clinic.  If you do not have a primary care provider, please call [phone] and they will assist you.        Care EveryWhere ID     This is your Care EveryWhere ID. This could be used by other organizations to access your [place]  medical records  [id number]         Blood Pressure from Last 3 Encounters:   09/22/17 147/87   06/12/17 128/78   05/25/17 112/76    Weight from Last 3 Encounters:   09/22/17 80 kg (176 lb 4.8 oz)   06/12/17 59.4 kg (131 lb)   05/25/17 61.2 kg (135 lb)               Primary Care Provider Office Phone # Fax #    Mary Carter PA-C [phone]       [address] [address]        Equal Access to Services     [facility]: Hadii aad ku hadasho Soomaali, waaxda luqadaha, qaybta kaalmada adeegyada, hafsa bernabe . So [facility] [phone].    ATENCIÓN: Si habla español, tiene a lewis disposición servicios gratuitos de asistencia lingüística. [facility] [phone].    We comply with applicable federal civil rights laws and [place] laws. We do not discriminate on the basis of race, color, national origin, age, disability sex, sexual orientation or gender identity.            Thank you!     Thank you for choosing [facility]  for your care. Our goal is always to provide you with excellent care. Hearing back from our patients is one way we can continue to improve our services. Please take a few minutes to complete the written survey that you may receive in the mail after your visit with us. Thank you!             Your Updated Medication List - Protect others around you: Learn how to safely use, store and throw away your medicines at www.disposemymeds.org.          This list is accurate as of: 9/26/17  4:17 PM.  Always use your most recent med list.                   Brand Name Dispense Instructions for use Diagnosis    bisacodyl 10 MG Suppository    DULCOLAX    25 suppository    Place 1 suppository (10 mg) rectally daily as needed for constipation    Constipation during pregnancy, third trimester       cephALEXin 500 MG capsule    KEFLEX    21 capsule    Take 1 capsule (500 mg) by mouth 3 times daily for 7 days    Infection of  urinary tract in pregnancy, first trimester       DESITIN Oint     99 g    Apply to sore areas externally 3 times a day.        hydrOXYzine 50 MG capsule    VISTARIL    30 capsule    Take 1 capsule (50 mg) by mouth nightly as needed (sleep)    Supervision of high-risk pregnancy of young multigravida       metroNIDAZOLE 500 MG tablet    FLAGYL    14 tablet    Take 1 tablet (500 mg) by mouth 2 times daily    BV (bacterial vaginosis)       prenatal multivitamin plus iron 27-0.8 MG Tabs per tablet     100 tablet    Take 1 tablet by mouth daily    Supervision of high-risk pregnancy of young multigravida       psyllium 0.52 G capsule     90 capsule    Take 1 capsule (0.52 g) by mouth daily    Constipation during pregnancy, third trimester       triamcinolone 0.1 % cream    KENALOG    80 g    Apply sparingly to affected area three times daily as needed    Eczema, unspecified type       valACYclovir 500 MG tablet    VALTREX    6 tablet    Take 1 tablet (500 mg) by mouth 2 times daily    HSV (herpes simplex virus) infection

## 2017-10-03 ENCOUNTER — OFFICE VISIT (OUTPATIENT)
Dept: MATERNAL FETAL MEDICINE | Facility: CLINIC | Age: 32
End: 2017-10-03
Attending: OBSTETRICS & GYNECOLOGY
Payer: MEDICAID

## 2017-10-03 ENCOUNTER — PRENATAL OFFICE VISIT (OUTPATIENT)
Dept: OBGYN | Facility: CLINIC | Age: 32
End: 2017-10-03
Payer: MEDICAID

## 2017-10-03 ENCOUNTER — TELEPHONE (OUTPATIENT)
Dept: OBGYN | Facility: CLINIC | Age: 32
End: 2017-10-03

## 2017-10-03 ENCOUNTER — HOSPITAL ENCOUNTER (OUTPATIENT)
Dept: ULTRASOUND IMAGING | Facility: CLINIC | Age: 32
Discharge: HOME | End: 2017-10-03
Attending: OBSTETRICS & GYNECOLOGY | Admitting: OBSTETRICS & GYNECOLOGY
Payer: MEDICAID

## 2017-10-03 VITALS
SYSTOLIC BLOOD PRESSURE: 114 MMHG | DIASTOLIC BLOOD PRESSURE: 75 MMHG | BODY MASS INDEX: 34.2 KG/M2 | WEIGHT: 181 LBS | HEART RATE: 99 BPM | OXYGEN SATURATION: 98 %

## 2017-10-03 DIAGNOSIS — F11.20 METHADONE MAINTENANCE TREATMENT AFFECTING PREGNANCY, ANTEPARTUM (H): ICD-10-CM

## 2017-10-03 DIAGNOSIS — F11.20 METHADONE MAINTENANCE TREATMENT AFFECTING PREGNANCY IN THIRD TRIMESTER (H): ICD-10-CM

## 2017-10-03 DIAGNOSIS — Z87.898 HISTORY OF SUBSTANCE USE: ICD-10-CM

## 2017-10-03 DIAGNOSIS — F11.21 OPIOID USE DISORDER, MODERATE, IN EARLY REMISSION, ON MAINTENANCE THERAPY (H): Primary | ICD-10-CM

## 2017-10-03 DIAGNOSIS — O99.320 METHADONE MAINTENANCE TREATMENT AFFECTING PREGNANCY, ANTEPARTUM (H): ICD-10-CM

## 2017-10-03 DIAGNOSIS — O09.90 HIGH-RISK PREGNANCY, UNSPECIFIED TRIMESTER: Primary | ICD-10-CM

## 2017-10-03 DIAGNOSIS — O99.323 METHADONE MAINTENANCE TREATMENT AFFECTING PREGNANCY IN THIRD TRIMESTER (H): ICD-10-CM

## 2017-10-03 DIAGNOSIS — R76.8 HEPATITIS C ANTIBODY POSITIVE IN BLOOD: ICD-10-CM

## 2017-10-03 PROCEDURE — 76819 FETAL BIOPHYS PROFIL W/O NST: CPT

## 2017-10-03 PROCEDURE — 99207 ZZC PRENATAL VISIT: CPT | Performed by: OBSTETRICS & GYNECOLOGY

## 2017-10-03 NOTE — PROGRESS NOTES
Had BPP, cephalic, .  Will continue weekly.  Strained her back, note is written that she should avoid lifting or activity that aggravates back pain.  Had cortisone shots in the past, discussed PT as an option during pregnancy.  Has not done GTT yet.  Worried about separation from baby if prolonged NICU hospitalization for treatment of  abstinence is needed, discussed possibility of boarding. Hep C quant sent at last visit 79, will refer to GI.  ID was consulted by Dr. Rangel at last visit, advised that syphilis can be considered treated.  RTC 1 week.  AM

## 2017-10-03 NOTE — MR AVS SNAPSHOT
After Visit Summary   10/3/2017    Giselle Mackenzie    MRN: [id number]           Patient Information     Date Of Birth          1985        Visit Information        Provider Department      10/3/2017 4:00 PM Eunice Herrera MD [facility] Maternal Fetal Medicine [facility]        Today's Diagnoses     Opioid use disorder, moderate, in early remission, on maintenance therapy (H)    -  1    History of substance use        Methadone maintenance treatment affecting pregnancy in third trimester (H)           Follow-ups after your visit        Future tests that were ordered for you today     Open Future Orders        Priority Expected Expires Ordered    MFM BPP Single Routine 10/10/2017 8/3/2018 10/3/2017    MFM BPP Single Routine 10/17/2017 8/3/2018 10/3/2017    MFM US Comprehensive Single F/U Routine 10/24/2017 10/3/2018 10/3/2017            Who to contact     If you have questions or need follow up information about today's clinic visit or your schedule please contact [facility] MATERNAL FETAL MEDICINE [facility] directly at [phone].  Normal or non-critical lab and imaging results will be communicated to you by MyChart, letter or phone within 4 business days after the clinic has received the results. If you do not hear from us within 7 days, please contact the clinic through [employer] or phone. If you have a critical or abnormal lab result, we will notify you by phone as soon as possible.  Submit refill requests through [employer] or call your pharmacy and they will forward the refill request to us. Please allow 3 business days for your refill to be completed.          Additional Information About Your Visit        MyChart Information     [employer] gives you secure access to your electronic health record. If you see a primary care provider, you can also send messages to your care team and make appointments. If you have questions, please call your primary care clinic.  If you do not have a primary care  provider, please call [phone] and they will assist you.        Care EveryWhere ID     This is your Care EveryWhere ID. This could be used by other organizations to access your [place] medical records  [id number]         Blood Pressure from Last 3 Encounters:   10/03/17 114/75   09/22/17 147/87   06/12/17 128/78    Weight from Last 3 Encounters:   10/03/17 82.1 kg (181 lb)   09/22/17 80 kg (176 lb 4.8 oz)   06/12/17 59.4 kg (131 lb)               Primary Care Provider Office Phone # Fax #    Mary Carter PA-C [phone]       [address] [address]        Equal Access to Services     SHYANN SANDHU : Hadii danyelle ledesma hadasho Soomaali, waaxda luqadaha, qaybta kaalmada adeegyada, waxesme katz haynick allen. So [facility] [phone].    ATENCIÓN: Si habla español, tiene a lewis disposición servicios gratuitos de asistencia lingüística. Llame al [phone].    We comply with applicable federal civil rights laws and [place] laws. We do not discriminate on the basis of race, color, national origin, age, disability, sex, sexual orientation, or gender identity.            Thank you!     Thank you for choosing [facility]  for your care. Our goal is always to provide you with excellent care. Hearing back from our patients is one way we can continue to improve our services. Please take a few minutes to complete the written survey that you may receive in the mail after your visit with us. Thank you!             Your Updated Medication List - Protect others around you: Learn how to safely use, store and throw away your medicines at www.disposemymeds.org.          This list is accurate as of: 10/3/17  4:42 PM.  Always use your most recent med list.                   Brand Name Dispense Instructions for use Diagnosis    bisacodyl 10 MG Suppository    DULCOLAX    25 suppository    Place 1 suppository (10 mg) rectally daily as needed for  constipation    Constipation during pregnancy, third trimester       DESITIN Oint     99 g    Apply to sore areas externally 3 times a day.        hydrOXYzine 50 MG capsule    VISTARIL    30 capsule    Take 1 capsule (50 mg) by mouth nightly as needed (sleep)    Supervision of high-risk pregnancy of young multigravida       metroNIDAZOLE 500 MG tablet    FLAGYL    14 tablet    Take 1 tablet (500 mg) by mouth 2 times daily    BV (bacterial vaginosis)       prenatal multivitamin plus iron 27-0.8 MG Tabs per tablet     100 tablet    Take 1 tablet by mouth daily    Supervision of high-risk pregnancy of young multigravida       psyllium 0.52 G capsule     90 capsule    Take 1 capsule (0.52 g) by mouth daily    Constipation during pregnancy, third trimester       triamcinolone 0.1 % cream    KENALOG    80 g    Apply sparingly to affected area three times daily as needed    Eczema, unspecified type       valACYclovir 500 MG tablet    VALTREX    6 tablet    Take 1 tablet (500 mg) by mouth 2 times daily    HSV (herpes simplex virus) infection

## 2017-10-03 NOTE — TELEPHONE ENCOUNTER
Per AM, patient needs to be seen sooner than later regarding + Hep C in pregnancy. Order placed. Call to Hepatology clinic at [phone] to coordinate. Next available appt was 1/18 which is too far out.  will get a message to RN team to call clinic back to discuss getting patient in sooner for appointment. Will await call from RN. Once appointment is made, will need to contact facility that patient is located at to provide appointment information.  Ankita Brennan

## 2017-10-04 ENCOUNTER — CARE COORDINATION (OUTPATIENT)
Dept: GASTROENTEROLOGY | Facility: CLINIC | Age: 32
End: 2017-10-04

## 2017-10-04 ENCOUNTER — HOSPITAL ENCOUNTER (OUTPATIENT)
Facility: CLINIC | Age: 32
Discharge: HOME | End: 2017-10-04
Attending: OBSTETRICS & GYNECOLOGY | Admitting: OBSTETRICS & GYNECOLOGY
Payer: MEDICAID

## 2017-10-04 VITALS
HEART RATE: 82 BPM | HEIGHT: 63 IN | TEMPERATURE: 98.6 F | BODY MASS INDEX: 31.89 KG/M2 | WEIGHT: 180 LBS | RESPIRATION RATE: 20 BRPM | SYSTOLIC BLOOD PRESSURE: 110 MMHG | DIASTOLIC BLOOD PRESSURE: 77 MMHG

## 2017-10-04 PROBLEM — Z36.89 ENCOUNTER FOR TRIAGE IN PREGNANT PATIENT: Status: ACTIVE | Noted: 2017-10-04

## 2017-10-04 LAB
SPECIMEN SOURCE: NORMAL
WET PREP SPEC: NORMAL

## 2017-10-04 PROCEDURE — 99214 OFFICE O/P EST MOD 30 MIN: CPT | Mod: 25

## 2017-10-04 PROCEDURE — 87591 N.GONORRHOEAE DNA AMP PROB: CPT | Performed by: OBSTETRICS & GYNECOLOGY

## 2017-10-04 PROCEDURE — 25000128 H RX IP 250 OP 636: Performed by: OBSTETRICS & GYNECOLOGY

## 2017-10-04 PROCEDURE — 96360 HYDRATION IV INFUSION INIT: CPT

## 2017-10-04 PROCEDURE — 25000132 ZZH RX MED GY IP 250 OP 250 PS 637: Performed by: OBSTETRICS & GYNECOLOGY

## 2017-10-04 PROCEDURE — 87210 SMEAR WET MOUNT SALINE/INK: CPT | Performed by: OBSTETRICS & GYNECOLOGY

## 2017-10-04 PROCEDURE — 87491 CHLMYD TRACH DNA AMP PROBE: CPT | Performed by: OBSTETRICS & GYNECOLOGY

## 2017-10-04 RX ORDER — LIDOCAINE 40 MG/G
CREAM TOPICAL
Status: DISCONTINUED | OUTPATIENT
Start: 2017-10-04 | End: 2017-10-04 | Stop reason: HOSPADM

## 2017-10-04 RX ORDER — ACETAMINOPHEN 325 MG/1
975 TABLET ORAL ONCE
Status: COMPLETED | OUTPATIENT
Start: 2017-10-04 | End: 2017-10-04

## 2017-10-04 RX ADMIN — SODIUM CHLORIDE, POTASSIUM CHLORIDE, SODIUM LACTATE AND CALCIUM CHLORIDE 1000 ML: 600; 310; 30; 20 INJECTION, SOLUTION INTRAVENOUS at 17:45

## 2017-10-04 RX ADMIN — ACETAMINOPHEN 975 MG: 325 TABLET, FILM COATED ORAL at 17:15

## 2017-10-04 NOTE — IP AVS SNAPSHOT
[facility]    [address] [place] [address]    Phone:  [phone]                                       After Visit Summary   10/4/2017    Giselle Mackenzie    MRN: [id number]           After Visit Summary Signature Page     I have received my discharge instructions, and my questions have been answered. I have discussed any challenges I see with this plan with the nurse or doctor.    ..........................................................................................................................................  Patient/Patient Representative Signature      ..........................................................................................................................................  Patient Representative Print Name and Relationship to Patient    ..................................................               ................................................  Date                                            Time    ..........................................................................................................................................  Reviewed by Signature/Title    ...................................................              ..............................................  Date                                                            Time

## 2017-10-04 NOTE — IP AVS SNAPSHOT
MRN:[id number]                      After Visit Summary   10/4/2017    Giselle Mackenzie    MRN: [id number]           Thank you!     Thank you for choosing [facility] for your care. Our goal is always to provide you with excellent care. Hearing back from our patients is one way we can continue to improve our services. Please take a few minutes to complete the written survey that you may receive in the mail after you visit with us. Thank you!        Patient Information     Date Of Birth          1985        Designated Caregiver       Most Recent Value    Caregiver    Will someone help with your care after discharge? no      About your hospital stay     You were admitted on:  2017 You last received care in the:  [facility]    You were discharged on:  2017        Reason for your hospital stay       You were evaluated for  contractions. Your cervix did not change over four hours, which was very reassuring that you are not in  labor at this time.                  Who to Call     For medical emergencies, please call 911.  For non-urgent questions about your medical care, please call your primary care provider or clinic, [phone]          Attending Provider     Provider Specialty    Kalia Dorman MD OB/Gyn    Bernard, Lexy TATE MD OB/Gyn       Primary Care Provider Office Phone # Fax #    Mary Carter PA-C [phone]      Follow-up Appointments     Adult [facility] Follow-up and recommended labs and tests       Please keep your follow-up appointment with Dr. Alfaro on 10/11.    Appointments on [place] and/or [facility] (with [facility] or [facility] provider or service). Call [phone] if you haven't heard regarding these appointments within 7 days of discharge.                  Your next 10 appointments already scheduled     Oct 11, 2017 10:15 AM CDT   BAYRON MORSEP SINGLE with [id number]   [facility] [place]  ([facility])    [address]  [facility] [address]   [phone]            Oct 11, 2017 10:45 AM CDT   Radiology MD with FERNANDO VERMA MD   [facility] ([facility])    [address]  [facility] [address]   [phone]           Please arrive at the time given for your first appointment. This visit is used internally to schedule the physician's time during your ultrasound.            Oct 11, 2017 11:15 AM CDT   ESTABLISHED PRENATAL with Sarah Alfaro MD   [facility]    [address] [place] [address] [address]  [facility] [address]   [phone]            Dec 19, 2017  8:30 AM CST   Lab with  LAB   [facility] ([facility])    [address]  1st Floor  [facility] [address] [phone]            Dec 19, 2017  9:20 AM CST   (Arrive by 9:05 AM)   New General Liver with Von Montesinos MD   [facility] Hepatology ([facility])    [address]  3rd Floor  [facility] [id number] [phone]              Further instructions from your care team       Discharge Instruction for Undelivered Patients      You were seen for: Labor Assessment  You had (Test or Medicine): a speculum exam indicating your cervix is 2 cm, and upon recheck it is 2 cm.     Diet:   Drink 8 to 12 glasses of liquids (milk, juice, water) every day.  You may eat meals and snacks.     Activity:  Call your doctor or nurse midwife if your baby is moving less than usual.     Call your provider if you notice:  Swelling in your face or increased swelling in your hands or legs.  Headaches that are not relieved by Tylenol (acetaminophen).  Changes in your vision (blurring: seeing spots or stars.)  Nausea (sick to your stomach) and vomiting (throwing up).   Weight gain of 5 pounds or more per week.  Heartburn that  "doesn't go away.  Signs of bladder infection: pain when you urinate (use the toilet), need to go more often and more urgently.  The bag of conte (rupture of membranes) breaks, or you notice leaking in your underwear.  Bright red blood in your underwear.  Abdominal (lower belly) or stomach pain.  For first baby: Contractions (tightening) less than 5 minutes apart for one hour or more.  Second (plus) baby: Contractions (tightening) less than 10 minutes apart and getting stronger.      Follow-up:  As scheduled in the clinic   Make appointment to follow up in the clinic this week to help with sciatic pain.           Pending Results     Date and Time Order Name Status Description    10/4/2017 1623 Chlamydia trachomatis PCR In process     10/4/2017 1623 Neisseria gonorrhoea PCR In process             Statement of Approval     Ordered          10/04/17 2027  I have reviewed and agree with all the recommendations and orders detailed in this document.  EFFECTIVE NOW     Approved and electronically signed by:  Sarah Vidales MD             Admission Information     Date & Time Provider Department Dept. Phone    10/4/2017 Lexy Wagner MD [facility] [phone]      Your Vitals Were     Blood Pressure Pulse Temperature Respirations Height Weight    110/77 82 98.6  F (37  C) (Oral) 20 1.6 m (5' 3\") 81.6 kg (180 lb)    BMI (Body Mass Index)                   31.89 kg/m2           MyChart Information     [employer] gives you secure access to your electronic health record. If you see a primary care provider, you can also send messages to your care team and make appointments. If you have questions, please call your primary care clinic.  If you do not have a primary care provider, please call [phone] and they will assist you.        Care EveryWhere ID     This is your Care EveryWhere ID. This could be used by other organizations to access your [place] medical records  [id number]        Equal Access to Services     [facility] " GAAR : Hadii aad ku dina Simpsonali, waaxda luqadaha, qaybta kaalmada adekarel, waxesme sterling haynick garridovidalmonet bernabe . So [facility] [phone].    ATENCIÓN: Si habla español, tiene a lewis disposición servicios gratuitos de asistencia lingüística. Llame al [phone].    We comply with applicable federal civil rights laws and [place] laws. We do not discriminate on the basis of race, color, national origin, age, disability, sex, sexual orientation, or gender identity.               Review of your medicines      CONTINUE these medicines which have NOT CHANGED        Dose / Directions    bisacodyl 10 MG Suppository   Commonly known as:  DULCOLAX   Used for:  Constipation during pregnancy, third trimester        Dose:  10 mg   Place 1 suppository (10 mg) rectally daily as needed for constipation   Quantity:  25 suppository   Refills:  1       DESITIN Oint        Apply to sore areas externally 3 times a day.   Quantity:  99 g   Refills:  0       hydrOXYzine 50 MG capsule   Commonly known as:  VISTARIL   Used for:  Supervision of high-risk pregnancy of young multigravida        Dose:  50 mg   Take 1 capsule (50 mg) by mouth nightly as needed (sleep)   Quantity:  30 capsule   Refills:  3       KEFLEX PO        Take by mouth 3 times daily   Refills:  0       METHADONE HCL PO        Dose:  90 mg   Take 90 mg by mouth daily   Refills:  0       metroNIDAZOLE 500 MG tablet   Commonly known as:  FLAGYL   Used for:  BV (bacterial vaginosis)        Dose:  500 mg   Take 1 tablet (500 mg) by mouth 2 times daily   Quantity:  14 tablet   Refills:  0       prenatal multivitamin plus iron 27-0.8 MG Tabs per tablet   Used for:  Supervision of high-risk pregnancy of young multigravida        Dose:  1 tablet   Take 1 tablet by mouth daily   Quantity:  100 tablet   Refills:  3       psyllium 0.52 G capsule   Used for:  Constipation during pregnancy, third trimester        Dose:  1 capsule   Take 1 capsule (0.52 g) by mouth daily    Quantity:  90 capsule   Refills:  3       triamcinolone 0.1 % cream   Commonly known as:  KENALOG   Used for:  Eczema, unspecified type        Apply sparingly to affected area three times daily as needed   Quantity:  80 g   Refills:  0       valACYclovir 500 MG tablet   Commonly known as:  VALTREX   Used for:  HSV (herpes simplex virus) infection        Dose:  500 mg   Take 1 tablet (500 mg) by mouth 2 times daily   Quantity:  6 tablet   Refills:  3               ANTIBIOTIC INSTRUCTION     You've Been Prescribed an Antibiotic - Now What?  Your healthcare team thinks that you or your loved one might have an infection. Some infections can be treated with antibiotics, which are powerful, life-saving drugs. Like all medications, antibiotics have side effects and should only be used when necessary. There are some important things you should know about your antibiotic treatment.      Your healthcare team may run tests before you start taking an antibiotic.    Your team may take samples (e.g., from your blood, urine or other areas) to run tests to look for bacteria. These test can be important to determine if you need an antibiotic at all and, if you do, which antibiotic will work best.      Within a few days, your healthcare team might change or even stop your antibiotic.    Your team may start you on an antibiotic while they are working to find out what is making you sick.    Your team might change your antibiotic because test results show that a different antibiotic would be better to treat your infection.    In some cases, once your team has more information, they learn that you do not need an antibiotic at all. They may find out that you don't have an infection, or that the antibiotic you're taking won't work against your infection. For example, an infection caused by a virus can't be treated with antibiotics. Staying on an antibiotic when you don't need it is more likely to be harmful than helpful.      You may  experience side effects from your antibiotic.    Like all medications, antibiotics have side effects. Some of these can be serious.    Let you healthcare team know if you have any known allergies when you are admitted to the hospital.    One significant side effect of nearly all antibiotics is the risk of severe and sometimes deadly diarrhea caused by Clostridium difficile (C. Difficile). This occurs when a person takes antibiotics because some good germs are destroyed. Antibiotic use allows C. diificile to take over, putting patients at high risk for this serious infection.    As a patient or caregiver, it is important to understand your or your loved one's antibiotic treatment. It is especially important for caregivers to speak up when patients can't speak for themselves. Here are some important questions to ask your healthcare team.    What infection is this antibiotic treating and how do you know I have that infection?    What side effects might occur from this antibiotic?    How long will I need to take this antibiotic?    Is it safe to take this antibiotic with other medications or supplements (e.g., vitamins) that I am taking?     Are there any special directions I need to know about taking this antibiotic? For example, should I take it with food?    How will I be monitored to know whether my infection is responding to the antibiotic?    What tests may help to make sure the right antibiotic is prescribed for me?      Information provided by:  www.cdc.gov/getsmart  U.S. Department of Health and Human Services  Centers for disease Control and Prevention  National Center for Emerging and Zoonotic Infectious Diseases  Division of Healthcare Quality Promotion         Protect others around you: Learn how to safely use, store and throw away your medicines at www.disposemymeds.org.             Medication List: This is a list of all your medications and when to take them. Check marks below indicate your daily home  schedule. Keep this list as a reference.      Medications           Morning Afternoon Evening Bedtime As Needed    bisacodyl 10 MG Suppository   Commonly known as:  DULCOLAX   Place 1 suppository (10 mg) rectally daily as needed for constipation                                DESITIN Oint   Apply to sore areas externally 3 times a day.                                hydrOXYzine 50 MG capsule   Commonly known as:  VISTARIL   Take 1 capsule (50 mg) by mouth nightly as needed (sleep)                                KEFLEX PO   Take by mouth 3 times daily                                METHADONE HCL PO   Take 90 mg by mouth daily                                metroNIDAZOLE 500 MG tablet   Commonly known as:  FLAGYL   Take 1 tablet (500 mg) by mouth 2 times daily                                prenatal multivitamin plus iron 27-0.8 MG Tabs per tablet   Take 1 tablet by mouth daily                                psyllium 0.52 G capsule   Take 1 capsule (0.52 g) by mouth daily                                triamcinolone 0.1 % cream   Commonly known as:  KENALOG   Apply sparingly to affected area three times daily as needed                                valACYclovir 500 MG tablet   Commonly known as:  VALTREX   Take 1 tablet (500 mg) by mouth 2 times daily

## 2017-10-04 NOTE — PROGRESS NOTES
10/4/2017  9:08 AM        Hep C Care Coordination Call   Received a message from the triage center with the following message;     Sunshine RN from  prenatal clinic, called in regards to patient having Hepatitis C. Stated first available is too far since pt is 4 months pregnant, looking to get patient in sooner. Please follow up at your earliest convenience at [phone], Sunshine stated it is a nurse line and an RN will answer.     Contacted  triage line and spoke with nurse Lacy. Patient is 4 months pregnant and is also in a treatment facility for chem dep treatment. I updated nurse that women should not be actively trying to conceive, be pregnant or breastfeed while on Hep C treatment. Also, patients with a hx/o substance abuse need 6 months of proven sobriety. Patient is currently in a rehab facility and is on methadone. I did recommend for patient to establish care in the hepatology department. Nurse to contact patient a the rehab facility to assist with setting up an appointment. I did give the  nurse my direct contact information [phone] for any other questions or concerns.         Cece Infante RN, BSN, PHN  [facility]   RN Care Coordinator Hepatology Specialty Clinic/Program

## 2017-10-04 NOTE — PROGRESS NOTES
Obstetrics Triage Note    HPI:  Giselle Mackenzie is a 32 year old  female at 34w3d by 15w3d US on 10/3/17, pregnancy complicated by opioid addiction (in treatment center, [place], and receives 90 mg of methadone every morning from [facility]), syphilis, hepatitis C, HSV and multiple UTI's (currently has a few days left from 2 week course of keflex) here with complaints of painful contractions.       Patient states that she began having painful contractions this morning at about 3 am. Since then, she has noticed they have increased in frequency and are more painful. She says that occur about every 3-10 minutes. This has been accompanied by mild headache and nausea, but no vomiting. She also reports significant back pain, but attributes this to her lower back strain that happened sometime last week.    Her pregnancy has been complicated by a loss of follow up from May 25 until , which she says is due to lack of insurance at that time. Since resuming follow up, she was seen in clinic by Dr. Rangel. At her visit, she claimed to have been sober for 2 months after being in treatment. Yesterday, she followed up with Dr. Daly, BPP was 8/8, cephalic on ultrasound. Her Hep C quat was 79 and she was instructed to follow up with GI. Her last syphilis titer was 2, s/p treatment.      + FM.  She states that she has otherwise been feeling well. She denies fever, chest pain, SOB, vaginal bleeding, vaginal discharge, dysuria. Endorses constipation, but has been ongoing for several months.     ROS:  Negative except as mentioned in HPI.    PMH:  Past Medical History:   Diagnosis Date     ADD (attention deficit disorder)      Bipolar disorder, unspecified      Cellulitis 2013     Liver cirrhoses      OCD (obsessive compulsive disorder)      PTSD (post-traumatic stress disorder)      PSHx:  History reviewed. No pertinent surgical history.    Medications:  Current Facility-Administered Medications   Medication     lidocaine 1  "% 1 mL     lidocaine (LMX4) kit     sodium chloride (PF) 0.9% PF flush 3 mL     sodium chloride (PF) 0.9% PF flush 3 mL     lactated ringers BOLUS 1,000 mL     acetaminophen (TYLENOL) tablet 975 mg     Allergies:  Allergen Reactions     Sulfa Drugs Anaphylaxis     Latex Hives     Nickel Hives     Suboxone      fainting     Physical Exam:   Vitals:    10/04/17 1526   BP: 110/77   Pulse: 82   Resp: 20   Temp: 98.6  F (37  C)   TempSrc: Oral   Weight: 81.6 kg (180 lb)   Height: 1.6 m (5' 3\")     Gen: Patient was laying in bed, relatively uncomfortable  CV: Regular rate and rhythm, no murmurs, gallops or rubs  Pulm: CTAB, no increased work of breathing  Abd: soft, gravid, non-tender, non-distended  Cx: 2/60/-2  SSE: Cervix appeared grossly normal, scant amount of white discharge in vagina.     NST:  FHT: 130 bpm, moderate jalil, no accels, no decels  [place]: difficult to read due to motion, no definite contractions    Labs:  Reviewed labs from previous prenatal visits  Sent for chlamydia and gonorrhea    Imaging: bedside ultrasound showed Vertex potsition    Assessment/Plan: Giselle Mackenzie is a 32 year old female  at 34w3d by US on 10/3/17, here for painful contractions since 3 am this morning.      Contractions: patient reported frequent contractions. Exam revealed a cervix dilated to about 2 cm. Pending documented cervical change will continue to monitor for now. US shows vertex position.   - IV Fuids  - PRN tylenol for headache and back pain  - Heat pack   - Recheck cervical exam in a few hours  - UA, wet prep, GC/Chlam pending    FWB: category I reactive    Inessa Brooks MD  OB/GYN PGY3    Interim Update:    Patient re-examined at . She states her back pain is still unbearable and she continues to have contractions. She was able to sleep soundly, however, and eat a full meal. Cvx rechecked and was 2/60/-3. She was very upset that she was not in labor and states she just wants \"to be done.\" Exam and " tocometry very reassuring overall. Offered vistaril for sleep but patient declines prescription. She may try tylenol and warm packs/warm baths for her back pain. Urinalysis initially ordered but was not sent, as patient is currently being treated for a UTI and has three days left of her antibiotics. Follow-up appointment scheduled with Dr. Alfaro on 10/11.     Patient seen with RASHIDA Gaines. She was very upset that we are not putting her on bedrest restrictions, because she doesn't want to spend as much time in group sessions at [place]. She is also upset that she is not in labor. She does not think we are listening to her or believing how uncomfortable she is. We explained the role of L&D triage is to rule out emergencies such as  labor. Her workup did not reveal any infections at this time and her clinical picture is overall reassuring. Fetal status is reassuring as well. She wants to be seen in clinic before her visit on 10/11. I made a call to [facility], and they can get her in to clinic either Thursday or Friday of this week (10/5 or 10/6) and will contact her with available times.     Disposition: Discharge to [facility].    Sarah Vidales MD  OB/GYN, PGY2  10/04/17

## 2017-10-04 NOTE — TELEPHONE ENCOUNTER
Return call from Cece Infante RN Coordinator at hepatology clinic.  She stated that no treatment is done during pregnancy/breastfeeding.  Once patient has delivered and is no longer breastfeeding, she can start treatment.  Direct line for Cece is [phone].  Pt can get in to establish care and get connected to other resources, but treatment will not start yet.  Per most insurance coverage,  needs 6 months of sobriety prior to treatment.  Discussed with nurse at [facility] and appt was scheduled for 12/19/17.  AVS faxed to [facility] with upcoming appointments noted.  Bev Be RN

## 2017-10-05 LAB
C TRACH DNA SPEC QL NAA+PROBE: NEGATIVE
N GONORRHOEA DNA SPEC QL NAA+PROBE: NEGATIVE
SPECIMEN SOURCE: NORMAL
SPECIMEN SOURCE: NORMAL

## 2017-10-05 NOTE — PROVIDER NOTIFICATION
10/04/17 2023   Provider Notification   Provider Name/Title Dr. Vidales   Method of Notification In Department;At Bedside   Request Evaluate in Person   Notification Reason Status Update;SVE     Provider preformed SVE and is 2-60- -2, which is unchanged from prior check. Plan per provider is to d/c pt home.

## 2017-10-05 NOTE — PLAN OF CARE
Problem: Labor (Cervical Ripen, Induct, Augment) (Adult,Obstetrics,Pediatric)  Goal: Signs and Symptoms of Listed Potential Problems Will be Absent, Minimized or Managed (Labor)  Signs and symptoms of listed potential problems will be absent, minimized or managed by discharge/transition of care (reference Labor (Cervical Ripen, Induct, Augment) (Adult,Obstetrics,Pediatric) CPG).   Outcome: No Change  Patient frustrated with plan to go home. Patient stated that she is not able to do her daily activities due to the pain. Patient encouraged to follow up in the clinic to discuss the pain the she is having with sciatic pain. Patient denies feeling any contraction pain at this time. Patient's [facility] sent taxi to pick patient up.

## 2017-10-05 NOTE — PLAN OF CARE
Problem: Labor (Cervical Ripen, Induct, Augment) (Adult,Obstetrics,Pediatric)  Goal: Signs and Symptoms of Listed Potential Problems Will be Absent, Minimized or Managed (Labor)  Signs and symptoms of listed potential problems will be absent, minimized or managed by discharge/transition of care (reference Labor (Cervical Ripen, Induct, Augment) (Adult,Obstetrics,Pediatric) CPG).   Outcome: No Change  [facility]: [facility]   Reason for maternal/fetal assessment per patient is Contractions  . Patient is a . Prenatal record reviewed.      Obstetric History       T2      L1     SAB0   TAB0   Ectopic0   Multiple0   Live Births1        # Outcome Date GA Lbr Kleber/2nd Weight Sex Delivery Anes PTL Lv   7 Current                     6 Term 12 40w0d   3.487 kg (7 lb 11 oz) F     Y        Name: Sangeetha   5 Term 05 37w0d   3.033 kg (6 lb 11 oz) M     Y JOSSY      Name: Lucio   4 AB                     3 SAB                     2 SAB                     1 SAB                            Medical History:   Past Medical History:   Diagnosis Date     ADD (attention deficit disorder)       Bipolar disorder, unspecified       Cellulitis      Liver cirrhoses       OCD (obsessive compulsive disorder)       PTSD (post-traumatic stress disorder)     . Gestational Age 34w3d. VSS. Cervix: dilated to 2 which is unchanged from previous checks.  Fetal movement present. Patient denies cramping, backache, vaginal discharge, pelvic pressure, UTI symptoms, GI problems, bloody show, vaginal bleeding, edema, headache, visual disturbances, epigastric or URQ pain, abdominal pain, rupture of membranes. t.  Action: Verbal consent for EFM. Triage assessment completed. EFM applied for fetal well being. Uterine assessment applied. Fetal assessment: Presumed adequate fetal oxygenation documented (see flow record). Patient education pamphlets given on fetal movement count. Patient instructed to  report change in fetal movement, vaginal leaking of fluid or bleeding, abdominal pain, or any concerns related to the pregnancy to her nurse/physician.   Response: Dr. Vidales and Dr. Wagner informed of patient's arrival and patient's status. Plan per provider is discharge patient home, and have patient follow up in clinic due to sciatic pain. Patient verbalized understanding of education and verbalized agreement with plan. Discharged ambulatory at 2100.

## 2017-10-05 NOTE — DISCHARGE INSTRUCTIONS
Discharge Instruction for Undelivered Patients      You were seen for: Labor Assessment  You had (Test or Medicine): a speculum exam indicating your cervix is 2 cm, and upon recheck it is 2 cm.     Diet:   Drink 8 to 12 glasses of liquids (milk, juice, water) every day.  You may eat meals and snacks.     Activity:  Call your doctor or nurse midwife if your baby is moving less than usual.     Call your provider if you notice:  Swelling in your face or increased swelling in your hands or legs.  Headaches that are not relieved by Tylenol (acetaminophen).  Changes in your vision (blurring: seeing spots or stars.)  Nausea (sick to your stomach) and vomiting (throwing up).   Weight gain of 5 pounds or more per week.  Heartburn that doesn't go away.  Signs of bladder infection: pain when you urinate (use the toilet), need to go more often and more urgently.  The bag of conte (rupture of membranes) breaks, or you notice leaking in your underwear.  Bright red blood in your underwear.  Abdominal (lower belly) or stomach pain.  For first baby: Contractions (tightening) less than 5 minutes apart for one hour or more.  Second (plus) baby: Contractions (tightening) less than 10 minutes apart and getting stronger.      Follow-up:  As scheduled in the clinic   Make appointment to follow up in the clinic this week to help with sciatic pain.

## 2017-10-11 ENCOUNTER — OFFICE VISIT (OUTPATIENT)
Dept: MATERNAL FETAL MEDICINE | Facility: CLINIC | Age: 32
End: 2017-10-11
Attending: OBSTETRICS & GYNECOLOGY
Payer: MEDICAID

## 2017-10-11 ENCOUNTER — PRENATAL OFFICE VISIT (OUTPATIENT)
Dept: OBGYN | Facility: CLINIC | Age: 32
End: 2017-10-11
Payer: MEDICAID

## 2017-10-11 ENCOUNTER — HOSPITAL ENCOUNTER (OUTPATIENT)
Dept: ULTRASOUND IMAGING | Facility: CLINIC | Age: 32
Discharge: HOME | End: 2017-10-11
Attending: OBSTETRICS & GYNECOLOGY | Admitting: OBSTETRICS & GYNECOLOGY
Payer: MEDICAID

## 2017-10-11 VITALS
WEIGHT: 182 LBS | OXYGEN SATURATION: 98 % | TEMPERATURE: 99.2 F | HEART RATE: 113 BPM | SYSTOLIC BLOOD PRESSURE: 119 MMHG | BODY MASS INDEX: 32.24 KG/M2 | DIASTOLIC BLOOD PRESSURE: 73 MMHG

## 2017-10-11 DIAGNOSIS — Z23 NEED FOR TDAP VACCINATION: ICD-10-CM

## 2017-10-11 DIAGNOSIS — O09.629 SUPERVISION OF HIGH-RISK PREGNANCY OF YOUNG MULTIGRAVIDA: Primary | ICD-10-CM

## 2017-10-11 DIAGNOSIS — B00.9 HSV (HERPES SIMPLEX VIRUS) INFECTION: ICD-10-CM

## 2017-10-11 DIAGNOSIS — F19.10 SUBSTANCE ABUSE AFFECTING PREGNANCY IN THIRD TRIMESTER, ANTEPARTUM (H): Primary | ICD-10-CM

## 2017-10-11 DIAGNOSIS — O99.323 SUBSTANCE ABUSE AFFECTING PREGNANCY IN THIRD TRIMESTER, ANTEPARTUM (H): Primary | ICD-10-CM

## 2017-10-11 DIAGNOSIS — Z87.898 HISTORY OF SUBSTANCE USE: ICD-10-CM

## 2017-10-11 LAB — HGB BLD-MCNC: 12.9 G/DL (ref 11.7–15.7)

## 2017-10-11 PROCEDURE — 87653 STREP B DNA AMP PROBE: CPT | Performed by: OBSTETRICS & GYNECOLOGY

## 2017-10-11 PROCEDURE — 76819 FETAL BIOPHYS PROFIL W/O NST: CPT

## 2017-10-11 PROCEDURE — 00000218 ZZHCL STATISTIC OBHBG - HEMOGLOBIN: Performed by: OBSTETRICS & GYNECOLOGY

## 2017-10-11 PROCEDURE — 36416 COLLJ CAPILLARY BLOOD SPEC: CPT | Performed by: OBSTETRICS & GYNECOLOGY

## 2017-10-11 PROCEDURE — 99207 ZZC PRENATAL VISIT: CPT | Performed by: OBSTETRICS & GYNECOLOGY

## 2017-10-11 RX ORDER — ONDANSETRON 4 MG/1
4-8 TABLET, ORALLY DISINTEGRATING ORAL EVERY 8 HOURS PRN
Qty: 20 TABLET | Refills: 1 | Status: SHIPPED | OUTPATIENT
Start: 2017-10-11 | End: 2017-12-07

## 2017-10-11 RX ORDER — VALACYCLOVIR HYDROCHLORIDE 500 MG/1
500 TABLET, FILM COATED ORAL 2 TIMES DAILY
Qty: 90 TABLET | Refills: 1 | Status: SHIPPED | OUTPATIENT
Start: 2017-10-11 | End: 2018-01-08

## 2017-10-11 ASSESSMENT — PATIENT HEALTH QUESTIONNAIRE - PHQ9: SUM OF ALL RESPONSES TO PHQ QUESTIONS 1-9: 4

## 2017-10-11 NOTE — MR AVS SNAPSHOT
After Visit Summary   10/11/2017    Giselle Mackenzie    MRN: [id number]           Patient Information     Date Of Birth          1985        Visit Information        Provider Department      10/11/2017 10:45 AM Leanna Quiroz MD [facility] [place]        Today's Diagnoses     Substance abuse affecting pregnancy in third trimester, antepartum    -  1       Follow-ups after your visit        Your next 10 appointments already scheduled     Oct 17, 2017  2:30 PM CDT   ESTABLISHED PRENATAL with Tahira Daly MD   [facility] ([facility])    [address]  [address]  [facility] [address]   [phone]            Oct 17, 2017  3:00 PM CDT   M BPP SINGLE with [id number]   [facility])    [address]  [facility] [address]   [phone]            Oct 17, 2017  3:30 PM CDT   Radiology MD with FERNANDO VERMA MD   [facility])    [address]  [facility] [address]   [phone]           Please arrive at the time given for your first appointment. This visit is used internally to schedule the physician's time during your ultrasound.            Oct 24, 2017  3:00 PM CDT   MATT BPP SINGLE with [email or web address]   [facility] [place] ([facility])    [address]  [facility] [address]   [phone]            Oct 24, 2017  3:30 PM CDT   Radiology MD with UR BAYRON DAVID   [facility] [place] ([facility])    [address]  [facility] [address]   [phone]           Please arrive at the time given for your first appointment. This visit is used internally to schedule the physician's time during your ultrasound.             Oct 24, 2017  3:45 PM CDT   ESTABLISHED PRENATAL with Angeles Szymanski MD   [facility] ([facility])    [address]  [address]  [facility] [address]   [phone]            Oct 31, 2017  3:00 PM CDT   MFM BPP SINGLE with [id number]   [facility] [place] ([facility])    [address]  [facility] [address] [phone]            Oct 31, 2017  3:30 PM CDT   Radiology MD with UR BAYRON DAVID   [facility] [place] ([facility])    [address]  [facility] [address]   [phone]           Please arrive at the time given for your first appointment. This visit is used internally to schedule the physician's time during your ultrasound.            Oct 31, 2017  3:45 PM CDT   ESTABLISHED PRENATAL with Yani Wilson MD   [facility] ([facility])    [address] [facility] [address]  [facility] [address]   [phone]            Nov 07, 2017  3:00 PM CST   MFM BPP SINGLE with [email or web address]   [facility] [place] ([facility])    [address]  [facility] [address] [phone]              Future tests that were ordered for you today     Open Future Orders        Priority Expected Expires Ordered    Maternal Fetal BPP Single Routine  10/11/2018 10/11/2017    Maternal Fetal BPP Single Routine  10/11/2018 10/11/2017    Maternal Fetal BPP Single Routine  10/11/2018 10/11/2017    Maternal Fetal BPP Single Routine  10/11/2018 10/11/2017            Who to contact     If you have questions or need follow up information about today's clinic visit or your schedule please contact [facility] directly at [phone].  Normal or non-critical lab and imaging results will be communicated to  you by MyChart, letter or phone within 4 business days after the clinic has received the results. If you do not hear from us within 7 days, please contact the clinic through Project 2020 or phone. If you have a critical or abnormal lab result, we will notify you by phone as soon as possible.  Submit refill requests through Project 2020 or call your pharmacy and they will forward the refill request to us. Please allow 3 business days for your refill to be completed.          Additional Information About Your Visit        [employer] Information     Project 2020 gives you secure access to your electronic health record. If you see a primary care provider, you can also send messages to your care team and make appointments. If you have questions, please call your primary care clinic.  If you do not have a primary care provider, please call [phone] and they will assist you.        Care EveryWhere ID     This is your Care EveryWhere ID. This could be used by other organizations to access your [facility] medical records  [id number]         Blood Pressure from Last 3 Encounters:   10/11/17 119/73   10/04/17 110/77   10/03/17 114/75    Weight from Last 3 Encounters:   10/11/17 182 lb (82.6 kg)   10/04/17 180 lb (81.6 kg)   10/03/17 181 lb (82.1 kg)                 Today's Medication Changes          These changes are accurate as of: 10/11/17 12:55 PM.  If you have any questions, ask your nurse or doctor.               Start taking these medicines.        Dose/Directions    ondansetron 4 MG ODT tab   Commonly known as:  ZOFRAN ODT   Used for:  Supervision of high-risk pregnancy of young multigravida   Started by:  Sarah Alfaro MD        Dose:  4-8 mg   Take 1-2 tablets (4-8 mg) by mouth every 8 hours as needed for nausea   Quantity:  20 tablet   Refills:  1         These medicines have changed or have updated prescriptions.        Dose/Directions    valACYclovir 500 MG tablet   Commonly known as:  VALTREX   This may have changed:   additional instructions   Used for:  HSV (herpes simplex virus) infection   Changed by:  Sarah Alfaro MD        Dose:  500 mg   Take 1 tablet (500 mg) by mouth 2 times daily Until delivery   Quantity:  90 tablet   Refills:  1            Where to get your medicines      These medications were sent to [id number] - [place], [place] - [address]  [address], [facility] [address]     Phone:  [phone]     ondansetron 4 MG ODT tab    valACYclovir 500 MG tablet                Primary Care Provider Office Phone # Fax #    Mary Carter PA-C [phone]       [facility] [address]  [facility] [address]        Equal Access to Services     SHYANN SANDHU AH: Hadii danyelle ledesma hadasho Soomaali, waaxda luqadaha, qaybta kaalmada adeegyada, waxesme allen. So [facility] [phone].    ATENCIÓN: Si habla español, tiene a lewis disposición servicios gratuitos de asistencia lingüística. [facility] [phone].    We comply with applicable federal civil rights laws and [place] laws. We do not discriminate on the basis of race, color, national origin, age, disability, sex, sexual orientation, or gender identity.            Thank you!     Thank you for choosing [facility]  for your care. Our goal is always to provide you with excellent care. Hearing back from our patients is one way we can continue to improve our services. Please take a few minutes to complete the written survey that you may receive in the mail after your visit with us. Thank you!             Your Updated Medication List - Protect others around you: Learn how to safely use, store and throw away your medicines at www.disposemymeds.org.          This list is accurate as of: 10/11/17 12:55 PM.  Always use your most recent med list.                   Brand Name Dispense Instructions for use Diagnosis    bisacodyl 10 MG Suppository    DULCOLAX    25  suppository    Place 1 suppository (10 mg) rectally daily as needed for constipation    Constipation during pregnancy, third trimester       DESITIN Oint     99 g    Apply to sore areas externally 3 times a day.        hydrOXYzine 50 MG capsule    VISTARIL    30 capsule    Take 1 capsule (50 mg) by mouth nightly as needed (sleep)    Supervision of high-risk pregnancy of young multigravida       KEFLEX PO      Take by mouth 3 times daily        METHADONE HCL PO      Take 90 mg by mouth daily        metroNIDAZOLE 500 MG tablet    FLAGYL    14 tablet    Take 1 tablet (500 mg) by mouth 2 times daily    BV (bacterial vaginosis)       ondansetron 4 MG ODT tab    ZOFRAN ODT    20 tablet    Take 1-2 tablets (4-8 mg) by mouth every 8 hours as needed for nausea    Supervision of high-risk pregnancy of young multigravida       prenatal multivitamin plus iron 27-0.8 MG Tabs per tablet     100 tablet    Take 1 tablet by mouth daily    Supervision of high-risk pregnancy of young multigravida       psyllium 0.52 G capsule     90 capsule    Take 1 capsule (0.52 g) by mouth daily    Constipation during pregnancy, third trimester       triamcinolone 0.1 % cream    KENALOG    80 g    Apply sparingly to affected area three times daily as needed    Eczema, unspecified type       valACYclovir 500 MG tablet    VALTREX    90 tablet    Take 1 tablet (500 mg) by mouth 2 times daily Until delivery    HSV (herpes simplex virus) infection

## 2017-10-11 NOTE — PROGRESS NOTES
Please see the imaging tab for details of the ultrasound performed today.    Leanna Quiroz MD  Specialist in Maternal-Fetal Medicine

## 2017-10-11 NOTE — PROGRESS NOTES
GBS today, cx 2.5/50/-2  States did glucose test at [facility] and a few BS checks are in care everywhere and normal, but could not find GCT. (too late now anyway)  today was drinking apple juice when I walked in so did not check random sugar today. Had normal BPP at [facility] and is thinking IUD for birthcontrol after delivery. Will start valtrex BID for HSV prophylaxis. Has weekly appts set up. BE

## 2017-10-11 NOTE — MR AVS SNAPSHOT
After Visit Summary   10/11/2017    Giselle Mackenzie    MRN: [id number]           Patient Information     Date Of Birth          1985        Visit Information        Provider Department      10/11/2017 11:15 AM Sarah Alfaro MD [facility]        Today's Diagnoses     Supervision of high-risk pregnancy of young multigravida    -  1    Need for Tdap vaccination        HSV (herpes simplex virus) infection           Follow-ups after your visit        Your next 10 appointments already scheduled     Oct 17, 2017  2:30 PM CDT   ESTABLISHED PRENATAL with Tahira Daly MD   [facility] ([facility])    [address]  [address]  [facility] [address]   [phone]            Oct 17, 2017  3:00 PM CDT   MFM BPP SINGLE with [id number]   [facility])    [address]  [facility] [address]   [phone]            Oct 17, 2017  3:30 PM CDT   Radiology MD with FERNANDO VERMA MD   [facility])    [address]  [facility] [address]   [phone]           Please arrive at the time given for your first appointment. This visit is used internally to schedule the physician's time during your ultrasound.            Oct 24, 2017  3:00 PM CDT   BAYRON BPP SINGLE with [id number]   [facility] [place] ([facility])    [address]  [facility] [address]   [phone]            Oct 24, 2017  3:30 PM CDT   Radiology MD with FERNANDO VERMA MD   [facility] [place] ([facility])    [address]  [facility] [address]   [phone]           Please arrive at the time given for your first appointment. This visit is used internally to schedule  the physician's time during your ultrasound.            Oct 24, 2017  3:45 PM CDT   ESTABLISHED PRENATAL with Angeles Szymanski MD   [facility] ([facility])    [address]  [address]  [facility] [address] [phone]            Oct 31, 2017  3:00 PM CDT   MFM BPP SINGLE with [id number]   [facility] [place] ([facility])    [address]  [facility] [address] [phone]            Oct 31, 2017  3:30 PM CDT   Radiology MD with UR BAYRON DAVID   [facility] [place] ([facility])    [address]  [facility] [id number] [phone]           Please arrive at the time given for your first appointment. This visit is used internally to schedule the physician's time during your ultrasound.            Oct 31, 2017  3:45 PM CDT   ESTABLISHED PRENATAL with Yani Wilson MD   [facility] ([facility])    [address] [facility] [address]  [facility] [address] [phone]            Nov 07, 2017  3:00 PM CST   MFM BPP SINGLE with [id number]   [facility] [place] ([facility])    [address]  [facility] [address] [phone]              Future tests that were ordered for you today     Open Future Orders        Priority Expected Expires Ordered    Maternal Fetal BPP Single Routine  10/11/2018 10/11/2017    Maternal Fetal BPP Single Routine  10/11/2018 10/11/2017    Maternal Fetal BPP Single Routine  10/11/2018 10/11/2017    Maternal Fetal BPP Single Routine  10/11/2018 10/11/2017            Who to contact     If you have questions or need follow up information about today's clinic visit or your schedule please contact [facility] directly at [phone].  Normal or non-critical lab and  imaging results will be communicated to you by [employer] [employer], letter or phone within 4 business days after the clinic has received the results. If you do not hear from us within 7 days, please contact the clinic through [employer] or phone. If you have a critical or abnormal lab result, we will notify you by phone as soon as possible.  Submit refill requests through [employer] or call your pharmacy and they will forward the refill request to us. Please allow 3 business days for your refill to be completed.          Additional Information About Your Visit        [employer] Information     [employer] gives you secure access to your electronic health record. If you see a primary care provider, you can also send messages to your care team and make appointments. If you have questions, please call your primary care clinic.  If you do not have a primary care provider, please call [phone] and they will assist you.        Care EveryWhere ID     This is your Care EveryWhere ID. This could be used by other organizations to access your [place] medical records  [id number]        Your Vitals Were     Pulse Temperature Pulse Oximetry BMI (Body Mass Index)          113 99.2  F (37.3  C) (Oral) 98% 32.24 kg/m2         Blood Pressure from Last 3 Encounters:   10/11/17 119/73   10/04/17 110/77   10/03/17 114/75    Weight from Last 3 Encounters:   10/11/17 182 lb (82.6 kg)   10/04/17 180 lb (81.6 kg)   10/03/17 181 lb (82.1 kg)              We Performed the Following     Group B strep PCR     OB hemoglobin          Today's Medication Changes          These changes are accurate as of: 10/11/17 12:42 PM.  If you have any questions, ask your nurse or doctor.               Start taking these medicines.        Dose/Directions    ondansetron 4 MG ODT tab   Commonly known as:  ZOFRAN ODT   Used for:  Supervision of high-risk pregnancy of young multigravida   Started by:  Sarah Alfaro MD        Dose:  4-8 mg   Take 1-2 tablets (4-8 mg) by  mouth every 8 hours as needed for nausea   Quantity:  20 tablet   Refills:  1         These medicines have changed or have updated prescriptions.        Dose/Directions    valACYclovir 500 MG tablet   Commonly known as:  VALTREX   This may have changed:  additional instructions   Used for:  HSV (herpes simplex virus) infection   Changed by:  Sarah Alfaro MD        Dose:  500 mg   Take 1 tablet (500 mg) by mouth 2 times daily Until delivery   Quantity:  90 tablet   Refills:  1            Where to get your medicines      These medications were sent to [id number] - [place], [place] - [address]  [address], [place] [address]     Phone:  [phone]     ondansetron 4 MG ODT tab    valACYclovir 500 MG tablet                Primary Care Provider Office Phone # Fax #    Mary Carter PA-C [phone]       [address] [address]        Equal Access to Services     SHYANN SANDHU AH: Hadii aad ku hadasho Soomaali, waaxda luqadaha, qaybta kaalmada adeegyada, waxay idiin hayaan neryeg kharamonet bernabe . So [facility] [phone].    ATENCIÓN: Si dalyla espkamilah, tiene a lewis disposición servicios gratuitos de asistencia lingüística. [facility] [phone].    We comply with applicable federal civil rights laws and [place] laws. We do not discriminate on the basis of race, color, national origin, age, disability, sex, sexual orientation, or gender identity.            Thank you!     Thank you for choosing [facility]  for your care. Our goal is always to provide you with excellent care. Hearing back from our patients is one way we can continue to improve our services. Please take a few minutes to complete the written survey that you may receive in the mail after your visit with us. Thank you!             Your Updated Medication List - Protect others around you: Learn how to safely use, store and throw away your medicines at  www.disposemymeds.org.          This list is accurate as of: 10/11/17 12:42 PM.  Always use your most recent med list.                   Brand Name Dispense Instructions for use Diagnosis    bisacodyl 10 MG Suppository    DULCOLAX    25 suppository    Place 1 suppository (10 mg) rectally daily as needed for constipation    Constipation during pregnancy, third trimester       DESITIN Oint     99 g    Apply to sore areas externally 3 times a day.        hydrOXYzine 50 MG capsule    VISTARIL    30 capsule    Take 1 capsule (50 mg) by mouth nightly as needed (sleep)    Supervision of high-risk pregnancy of young multigravida       KEFLEX PO      Take by mouth 3 times daily        METHADONE HCL PO      Take 90 mg by mouth daily        metroNIDAZOLE 500 MG tablet    FLAGYL    14 tablet    Take 1 tablet (500 mg) by mouth 2 times daily    BV (bacterial vaginosis)       ondansetron 4 MG ODT tab    ZOFRAN ODT    20 tablet    Take 1-2 tablets (4-8 mg) by mouth every 8 hours as needed for nausea    Supervision of high-risk pregnancy of young multigravida       prenatal multivitamin plus iron 27-0.8 MG Tabs per tablet     100 tablet    Take 1 tablet by mouth daily    Supervision of high-risk pregnancy of young multigravida       psyllium 0.52 G capsule     90 capsule    Take 1 capsule (0.52 g) by mouth daily    Constipation during pregnancy, third trimester       triamcinolone 0.1 % cream    KENALOG    80 g    Apply sparingly to affected area three times daily as needed    Eczema, unspecified type       valACYclovir 500 MG tablet    VALTREX    90 tablet    Take 1 tablet (500 mg) by mouth 2 times daily Until delivery    HSV (herpes simplex virus) infection

## 2017-10-12 LAB
GP B STREP DNA SPEC QL NAA+PROBE: NEGATIVE
SPECIMEN SOURCE: NORMAL

## 2017-10-17 ENCOUNTER — HOSPITAL ENCOUNTER (OUTPATIENT)
Dept: ULTRASOUND IMAGING | Facility: CLINIC | Age: 32
Discharge: HOME | End: 2017-10-17
Attending: OBSTETRICS & GYNECOLOGY | Admitting: OBSTETRICS & GYNECOLOGY
Payer: MEDICAID

## 2017-10-17 ENCOUNTER — OFFICE VISIT (OUTPATIENT)
Dept: MATERNAL FETAL MEDICINE | Facility: CLINIC | Age: 32
End: 2017-10-17
Attending: OBSTETRICS & GYNECOLOGY
Payer: MEDICAID

## 2017-10-17 ENCOUNTER — PRENATAL OFFICE VISIT (OUTPATIENT)
Dept: OBGYN | Facility: CLINIC | Age: 32
End: 2017-10-17
Payer: MEDICAID

## 2017-10-17 VITALS
BODY MASS INDEX: 32.77 KG/M2 | DIASTOLIC BLOOD PRESSURE: 80 MMHG | HEART RATE: 99 BPM | OXYGEN SATURATION: 98 % | SYSTOLIC BLOOD PRESSURE: 126 MMHG | WEIGHT: 185 LBS

## 2017-10-17 DIAGNOSIS — O99.323 SUBSTANCE ABUSE AFFECTING PREGNANCY IN THIRD TRIMESTER, ANTEPARTUM (H): Primary | ICD-10-CM

## 2017-10-17 DIAGNOSIS — O99.323 SUBSTANCE ABUSE AFFECTING PREGNANCY IN THIRD TRIMESTER, ANTEPARTUM (H): ICD-10-CM

## 2017-10-17 DIAGNOSIS — F19.10 SUBSTANCE ABUSE AFFECTING PREGNANCY IN THIRD TRIMESTER, ANTEPARTUM (H): ICD-10-CM

## 2017-10-17 DIAGNOSIS — O09.90 HIGH-RISK PREGNANCY, UNSPECIFIED TRIMESTER: Primary | ICD-10-CM

## 2017-10-17 DIAGNOSIS — F19.10 SUBSTANCE ABUSE AFFECTING PREGNANCY IN THIRD TRIMESTER, ANTEPARTUM (H): Primary | ICD-10-CM

## 2017-10-17 PROCEDURE — 99207 ZZC PRENATAL VISIT: CPT | Performed by: OBSTETRICS & GYNECOLOGY

## 2017-10-17 PROCEDURE — 76819 FETAL BIOPHYS PROFIL W/O NST: CPT

## 2017-10-17 NOTE — PROGRESS NOTES
BPP today 8/8, will continue weekly.  Lots of contractions, uncomfortable.  Cx unchanged from prior visit.  Taking Valtrex but it makes her nauseated, discussed importance of continuing medication, she plans to.  RTC 1 week.  AM

## 2017-10-17 NOTE — PROGRESS NOTES
"Please see \"Imaging\" tab under \"Chart Review\" for details of today's US.    Sandhya Mckenzie, DO    "

## 2017-10-17 NOTE — MR AVS SNAPSHOT
After Visit Summary   10/17/2017    Giselle Mackenzie    MRN: [id number]           Patient Information     Date Of Birth          1985        Visit Information        Provider Department      10/17/2017 2:30 PM Tahira Daly MD [facility]        Today's Diagnoses     High-risk pregnancy, unspecified trimester    -  1       Follow-ups after your visit        Your next 10 appointments already scheduled     Oct 24, 2017  3:00 PM CDT   MFM US COMPRE SINGLE F/U with [id number]   [facility])    [address]  [facility] [address] [phone]           Wear comfortable clothes and leave your valuables at home.            Oct 24, 2017  3:30 PM CDT   Radiology MD with FERNANDO VERMA MD   [facility])    [address]  [facility] [place] [address]   [phone]           Please arrive at the time given for your first appointment. This visit is used internally to schedule the physician's time during your ultrasound.            Oct 24, 2017  3:45 PM CDT   ESTABLISHED PRENATAL with Angeles Szymanski MD   [facility] ([facility])    [address] [facility] [address]  [facility] [address] [phone]            Oct 31, 2017  3:00 PM CDT   [facility] BPP SINGLE with [id number]   [facility] [place] ([facility])    [address]  [facility] [address] [phone]            Oct 31, 2017  3:30 PM CDT   Radiology MD with FERNANDO VERMA MD   [facility] [place] ([facility])    [address]  [facility] [address]   [phone]           Please arrive at the time given for your first appointment. This visit is used internally to schedule the physician's  time during your ultrasound.            Oct 31, 2017  3:45 PM CDT   ESTABLISHED PRENATAL with Yani Wilson MD   [facility] ([facility])    [address]  [address]  [facility] [address] [phone]            Nov 07, 2017  3:00 PM CST   BAYRON BPP SINGLE with [id number]   [facility] [place] ([facility])    [address]  [facility] [address] [phone]            Nov 07, 2017  3:30 PM CST   Radiology MD with UR BAYRON DAVID   [facility])    [address]  [facility] [address]   [phone]           Please arrive at the time given for your first appointment. This visit is used internally to schedule the physician's time during your ultrasound.            Nov 07, 2017  3:45 PM CST   ESTABLISHED PRENATAL with Angeles Szymanski MD   [facility] ([facility])    [address] [address] [phone]            Dec 19, 2017  9:20 AM CST   (Arrive by 9:05 AM)   New General Liver with Von Montesinos MD   [facility] ([facility] [facility])    [address] [place] [id number] [phone]              Who to contact     If you have questions or need follow up information about today's clinic visit or your schedule please contact [facility] directly at [phone].  Normal or non-critical lab and imaging results will be communicated to you by MyChart, letter or phone within 4 business days after the clinic has received the results. If you do not hear from us within 7 days, please contact the clinic through MyChart or phone. If you have a critical or abnormal lab result, we will notify you by phone as soon as possible.  Submit refill requests through [employer] or call  your pharmacy and they will forward the refill request to us. Please allow 3 business days for your refill to be completed.          Additional Information About Your Visit        MyChart Information     [employer] gives you secure access to your electronic health record. If you see a primary care provider, you can also send messages to your care team and make appointments. If you have questions, please call your primary care clinic.  If you do not have a primary care provider, please call [phone] and they will assist you.        Care EveryWhere ID     This is your Care EveryWhere ID. This could be used by other organizations to access your [place] medical records  [id number]        Your Vitals Were     Pulse Pulse Oximetry Breastfeeding? BMI (Body Mass Index)          99 98% No 32.77 kg/m2         Blood Pressure from Last 3 Encounters:   10/17/17 126/80   10/11/17 119/73   10/04/17 110/77    Weight from Last 3 Encounters:   10/17/17 185 lb (83.9 kg)   10/11/17 182 lb (82.6 kg)   10/04/17 180 lb (81.6 kg)              Today, you had the following     No orders found for display       Primary Care Provider Office Phone # Fax #    Mary Carter PA-C [phone]       [address] [address]        Equal Access to Services     SHYANN SANDHU : Hadii danyelle ku hadjungo Soomaali, waaxda luqadaha, qaybta kaalmada adeegyada, hafsa katz haynick allen. So [facility] [phone].    ATENCIÓN: Si habla español, tiene a lewis disposición servicios gratuitos de asistencia lingüística. Llame al [phone].    We comply with applicable federal civil rights laws and [place] laws. We do not discriminate on the basis of race, color, national origin, age, disability, sex, sexual orientation, or gender identity.            Thank you!     Thank you for choosing [facility]  for your care. Our goal is always to provide you with excellent care. Hearing back from our patients  is one way we can continue to improve our services. Please take a few minutes to complete the written survey that you may receive in the mail after your visit with us. Thank you!             Your Updated Medication List - Protect others around you: Learn how to safely use, store and throw away your medicines at www.disposemymeds.org.          This list is accurate as of: 10/17/17  3:37 PM.  Always use your most recent med list.                   Brand Name Dispense Instructions for use Diagnosis    bisacodyl 10 MG Suppository    DULCOLAX    25 suppository    Place 1 suppository (10 mg) rectally daily as needed for constipation    Constipation during pregnancy, third trimester       DESITIN Oint     99 g    Apply to sore areas externally 3 times a day.        hydrOXYzine 50 MG capsule    VISTARIL    30 capsule    Take 1 capsule (50 mg) by mouth nightly as needed (sleep)    Supervision of high-risk pregnancy of young multigravida       KEFLEX PO      Take by mouth 3 times daily        METHADONE HCL PO      Take 90 mg by mouth daily        metroNIDAZOLE 500 MG tablet    FLAGYL    14 tablet    Take 1 tablet (500 mg) by mouth 2 times daily    BV (bacterial vaginosis)       ondansetron 4 MG ODT tab    ZOFRAN ODT    20 tablet    Take 1-2 tablets (4-8 mg) by mouth every 8 hours as needed for nausea    Supervision of high-risk pregnancy of young multigravida       prenatal multivitamin plus iron 27-0.8 MG Tabs per tablet     100 tablet    Take 1 tablet by mouth daily    Supervision of high-risk pregnancy of young multigravida       psyllium 0.52 G capsule     90 capsule    Take 1 capsule (0.52 g) by mouth daily    Constipation during pregnancy, third trimester       triamcinolone 0.1 % cream    KENALOG    80 g    Apply sparingly to affected area three times daily as needed    Eczema, unspecified type       valACYclovir 500 MG tablet    VALTREX    90 tablet    Take 1 tablet (500 mg) by mouth 2 times daily Until delivery     HSV (herpes simplex virus) infection

## 2017-10-17 NOTE — MR AVS SNAPSHOT
After Visit Summary   10/17/2017    Giselle Mackenzie    MRN: [id number]           Patient Information     Date Of Birth          1985        Visit Information        Provider Department      10/17/2017 3:30 PM Sandhya Mckenzie DO [facility]        Today's Diagnoses     Substance abuse affecting pregnancy in third trimester, antepartum    -  1       Follow-ups after your visit        Your next 10 appointments already scheduled     Oct 17, 2017  3:30 PM CDT   Radiology MD with Sandhya Mckenzie DO   [facility] ([facility])    [address]  [facility] [address]   [phone]           Please arrive at the time given for your first appointment. This visit is used internally to schedule the physician's time during your ultrasound.            Oct 24, 2017  3:00 PM CDT   MFM US COMPRE SINGLE F/U with [id number]   [facility] ([facility])    [address]  [facility] [address] [phone]           Wear comfortable clothes and leave your valuables at home.            Oct 24, 2017  3:30 PM CDT   Radiology MD with FERNANDO VERMA MD   [facility] ([facility])    [address]  [facility] [address]   [phone]           Please arrive at the time given for your first appointment. This visit is used internally to schedule the physician's time during your ultrasound.            Oct 24, 2017  3:45 PM CDT   ESTABLISHED PRENATAL with Angeles Szymanski MD   [facility] [place]    [address] [place] [address] [address]  [facility] [id number] [phone]            Oct 31, 2017  3:00 PM CDT   MFMATT BPOLYA SINGLE with [id number]   [facility] ([facility])    [address]  [facility] [address]   [phone]            Oct 31, 2017  3:30 PM CDT   Radiology MD with FERNANDO VERMA MD   [facility] [place] ([facility])    [address]  [place] [address]   [phone]           Please arrive at the time given for your first appointment. This visit is used internally to schedule the physician's time during your ultrasound.            Oct 31, 2017  3:45 PM CDT   ESTABLISHED PRENATAL with Yani Wilson MD   [facility])    [address] [facility]   [facility] [address] [phone]            Nov 07, 2017  3:00 PM CST   BAYRON MORSEP SINGLE with [id number]   [facility]tras[facility] ([facility])    [address]  [facility] [address] [phone]            Nov 07, 2017  3:30 PM CST   Radiology MD with FERNANDO VERMA MD   [facility] ([facility])    [address]  [place] [address]   [phone]           Please arrive at the time given for your first appointment. This visit is used internally to schedule the physician's time during your ultrasound.            Nov 07, 2017  3:45 PM CST   ESTABLISHED PRENATAL with Angeles Szymanski MD   [facility]    [address] [facility]   [facility] [address] [phone]              Who to contact     If you have questions or need follow up information about today's clinic visit or your schedule please contact [facility] directly at [phone].  Normal or non-critical lab and imaging results will be communicated to you by MyChart, letter or phone within 4 business days after the clinic has received the results. If you do not hear  from us within 7 days, please contact the clinic through [employer] or phone. If you have a critical or abnormal lab result, we will notify you by phone as soon as possible.  Submit refill requests through [employer] or call your pharmacy and they will forward the refill request to us. Please allow 3 business days for your refill to be completed.          Additional Information About Your Visit        [employer] Information     [employer] gives you secure access to your electronic health record. If you see a primary care provider, you can also send messages to your care team and make appointments. If you have questions, please call your primary care clinic.  If you do not have a primary care provider, please call [phone] and they will assist you.        Care EveryWhere ID     This is your Care EveryWhere ID. This could be used by other organizations to access your [place] medical records  [id number]         Blood Pressure from Last 3 Encounters:   10/17/17 126/80   10/11/17 119/73   10/04/17 110/77    Weight from Last 3 Encounters:   10/17/17 83.9 kg (185 lb)   10/11/17 82.6 kg (182 lb)   10/04/17 81.6 kg (180 lb)              Today, you had the following     No orders found for display       Primary Care Provider Office Phone # Fax #    Mary Carter PA-C [phone]       [address] [address]        Equal Access to Services     SHYANN SANDHU : Hadii danyelle ku hadasho Soomaali, waaxda luqadaha, qaybta kaalmada hardikda, hafsa allen. So [facility] [phone].    ATENCIÓN: Si habla español, tiene a lewis disposición servicios gratuitos de asistencia lingüística. Harry al [phone].    We comply with applicable federal civil rights laws and [place] laws. We do not discriminate on the basis of race, color, national origin, age, disability, sex, sexual orientation, or gender identity.            Thank you!     Thank you for choosing [facility]  for your care. Our goal is always to provide you with excellent care. Hearing back from our patients is one way we can continue to improve our services. Please take a few minutes to complete the written survey that you may receive in the mail after your visit with us. Thank you!             Your Updated Medication List - Protect others around you: Learn how to safely use, store and throw away your medicines at www.disposemymeds.org.          This list is accurate as of: 10/17/17  3:29 PM.  Always use your most recent med list.                   Brand Name Dispense Instructions for use Diagnosis    bisacodyl 10 MG Suppository    DULCOLAX    25 suppository    Place 1 suppository (10 mg) rectally daily as needed for constipation    Constipation during pregnancy, third trimester       DESITIN Oint     99 g    Apply to sore areas externally 3 times a day.        hydrOXYzine 50 MG capsule    VISTARIL    30 capsule    Take 1 capsule (50 mg) by mouth nightly as needed (sleep)    Supervision of high-risk pregnancy of young multigravida       KEFLEX PO      Take by mouth 3 times daily        METHADONE HCL PO      Take 90 mg by mouth daily        metroNIDAZOLE 500 MG tablet    FLAGYL    14 tablet    Take 1 tablet (500 mg) by mouth 2 times daily    BV (bacterial vaginosis)       ondansetron 4 MG ODT tab    ZOFRAN ODT    20 tablet    Take 1-2 tablets (4-8 mg) by mouth every 8 hours as needed for nausea    Supervision of high-risk pregnancy of young multigravida       prenatal multivitamin plus iron 27-0.8 MG Tabs per tablet     100 tablet    Take 1 tablet by mouth daily    Supervision of high-risk pregnancy of young multigravida       psyllium 0.52 G capsule     90 capsule    Take 1 capsule (0.52 g) by mouth daily    Constipation during pregnancy, third trimester       triamcinolone 0.1 % cream    KENALOG    80 g    Apply sparingly to affected area three times daily as needed    Eczema, unspecified type        valACYclovir 500 MG tablet    VALTREX    90 tablet    Take 1 tablet (500 mg) by mouth 2 times daily Until delivery    HSV (herpes simplex virus) infection

## 2017-10-23 ENCOUNTER — ANESTHESIA EVENT (OUTPATIENT)
Dept: OBGYN | Facility: CLINIC | Age: 32
End: 2017-10-23
Payer: MEDICAID

## 2017-10-23 ENCOUNTER — HOSPITAL ENCOUNTER (INPATIENT)
Facility: CLINIC | Age: 32
LOS: 2 days | Discharge: HOME HEALTH | End: 2017-10-25
Attending: OBSTETRICS & GYNECOLOGY | Admitting: OBSTETRICS & GYNECOLOGY
Payer: MEDICAID

## 2017-10-23 ENCOUNTER — ANESTHESIA (OUTPATIENT)
Dept: OBGYN | Facility: CLINIC | Age: 32
End: 2017-10-23
Payer: MEDICAID

## 2017-10-23 LAB
ABO + RH BLD: NORMAL
ABO + RH BLD: NORMAL
AMPHETAMINES UR QL SCN: NEGATIVE
BLD GP AB SCN SERPL QL: NORMAL
BLOOD BANK CMNT PATIENT-IMP: NORMAL
CANNABINOIDS UR QL: NEGATIVE
COCAINE UR QL: NEGATIVE
GLUCOSE BLDC GLUCOMTR-MCNC: 92 MG/DL (ref 70–99)
HGB BLD-MCNC: 11.6 G/DL (ref 11.7–15.7)
OPIATES UR QL SCN: NEGATIVE
PCP UR QL SCN: NEGATIVE
PLATELET # BLD AUTO: 330 10E9/L (ref 150–450)
SPECIMEN EXP DATE BLD: NORMAL

## 2017-10-23 PROCEDURE — 59400 OBSTETRICAL CARE: CPT | Performed by: OBSTETRICS & GYNECOLOGY

## 2017-10-23 PROCEDURE — 25000125 ZZHC RX 250: Performed by: OBSTETRICS & GYNECOLOGY

## 2017-10-23 PROCEDURE — 10907ZC DRAINAGE OF AMNIOTIC FLUID, THERAPEUTIC FROM PRODUCTS OF CONCEPTION, VIA NATURAL OR ARTIFICIAL OPENING: ICD-10-PCS | Performed by: OBSTETRICS & GYNECOLOGY

## 2017-10-23 PROCEDURE — 85018 HEMOGLOBIN: CPT | Performed by: OBSTETRICS & GYNECOLOGY

## 2017-10-23 PROCEDURE — 36415 COLL VENOUS BLD VENIPUNCTURE: CPT | Performed by: OBSTETRICS & GYNECOLOGY

## 2017-10-23 PROCEDURE — 85049 AUTOMATED PLATELET COUNT: CPT | Performed by: OBSTETRICS & GYNECOLOGY

## 2017-10-23 PROCEDURE — S0020 INJECTION, BUPIVICAINE HYDRO: HCPCS | Performed by: ANESTHESIOLOGY

## 2017-10-23 PROCEDURE — 80307 DRUG TEST PRSMV CHEM ANLYZR: CPT | Performed by: OBSTETRICS & GYNECOLOGY

## 2017-10-23 PROCEDURE — 40000671 ZZH STATISTIC ANESTHESIA CASE

## 2017-10-23 PROCEDURE — 25000132 ZZH RX MED GY IP 250 OP 250 PS 637: Performed by: OBSTETRICS & GYNECOLOGY

## 2017-10-23 PROCEDURE — 0HQ9XZZ REPAIR PERINEUM SKIN, EXTERNAL APPROACH: ICD-10-PCS | Performed by: OBSTETRICS & GYNECOLOGY

## 2017-10-23 PROCEDURE — 87522 HEPATITIS C REVRS TRNSCRPJ: CPT | Performed by: OBSTETRICS & GYNECOLOGY

## 2017-10-23 PROCEDURE — 3E0R3BZ INTRODUCTION OF ANESTHETIC AGENT INTO SPINAL CANAL, PERCUTANEOUS APPROACH: ICD-10-PCS | Performed by: ANESTHESIOLOGY

## 2017-10-23 PROCEDURE — 86900 BLOOD TYPING SEROLOGIC ABO: CPT | Performed by: OBSTETRICS & GYNECOLOGY

## 2017-10-23 PROCEDURE — 86850 RBC ANTIBODY SCREEN: CPT | Performed by: OBSTETRICS & GYNECOLOGY

## 2017-10-23 PROCEDURE — 25000125 ZZHC RX 250: Performed by: ANESTHESIOLOGY

## 2017-10-23 PROCEDURE — 25000128 H RX IP 250 OP 636: Performed by: OBSTETRICS & GYNECOLOGY

## 2017-10-23 PROCEDURE — 86593 SYPHILIS TEST NON-TREP QUANT: CPT | Performed by: OBSTETRICS & GYNECOLOGY

## 2017-10-23 PROCEDURE — 12000030 ZZH R&B OB INTERMEDIATE UMMC

## 2017-10-23 PROCEDURE — 00000146 ZZHCL STATISTIC GLUCOSE BY METER IP

## 2017-10-23 PROCEDURE — 25000125 ZZHC RX 250

## 2017-10-23 PROCEDURE — 72200001 ZZH LABOR CARE VAGINAL DELIVERY SINGLE

## 2017-10-23 PROCEDURE — 25000132 ZZH RX MED GY IP 250 OP 250 PS 637: Performed by: STUDENT IN AN ORGANIZED HEALTH CARE EDUCATION/TRAINING PROGRAM

## 2017-10-23 PROCEDURE — 00HU33Z INSERTION OF INFUSION DEVICE INTO SPINAL CANAL, PERCUTANEOUS APPROACH: ICD-10-PCS | Performed by: ANESTHESIOLOGY

## 2017-10-23 PROCEDURE — 99215 OFFICE O/P EST HI 40 MIN: CPT

## 2017-10-23 PROCEDURE — 86901 BLOOD TYPING SEROLOGIC RH(D): CPT | Performed by: OBSTETRICS & GYNECOLOGY

## 2017-10-23 PROCEDURE — 87389 HIV-1 AG W/HIV-1&-2 AB AG IA: CPT | Performed by: OBSTETRICS & GYNECOLOGY

## 2017-10-23 PROCEDURE — 86780 TREPONEMA PALLIDUM: CPT | Performed by: OBSTETRICS & GYNECOLOGY

## 2017-10-23 RX ORDER — OXYTOCIN/0.9 % SODIUM CHLORIDE 30/500 ML
100 PLASTIC BAG, INJECTION (ML) INTRAVENOUS CONTINUOUS
Status: DISCONTINUED | OUTPATIENT
Start: 2017-10-23 | End: 2017-10-25 | Stop reason: HOSPADM

## 2017-10-23 RX ORDER — LIDOCAINE 40 MG/G
CREAM TOPICAL
Status: DISCONTINUED | OUTPATIENT
Start: 2017-10-23 | End: 2017-10-23

## 2017-10-23 RX ORDER — FENTANYL/BUPIVACAINE/NS/PF 2-1250MCG
PLASTIC BAG, INJECTION (ML) INJECTION
Status: COMPLETED
Start: 2017-10-23 | End: 2017-10-23

## 2017-10-23 RX ORDER — METHYLERGONOVINE MALEATE 0.2 MG/ML
200 INJECTION INTRAVENOUS
Status: DISCONTINUED | OUTPATIENT
Start: 2017-10-23 | End: 2017-10-23

## 2017-10-23 RX ORDER — MISOPROSTOL 200 UG/1
400 TABLET ORAL
Status: DISCONTINUED | OUTPATIENT
Start: 2017-10-23 | End: 2017-10-25 | Stop reason: HOSPADM

## 2017-10-23 RX ORDER — OXYTOCIN/0.9 % SODIUM CHLORIDE 30/500 ML
340 PLASTIC BAG, INJECTION (ML) INTRAVENOUS CONTINUOUS PRN
Status: DISCONTINUED | OUTPATIENT
Start: 2017-10-23 | End: 2017-10-25 | Stop reason: HOSPADM

## 2017-10-23 RX ORDER — ACETAMINOPHEN 325 MG/1
650 TABLET ORAL EVERY 4 HOURS PRN
Status: DISCONTINUED | OUTPATIENT
Start: 2017-10-23 | End: 2017-10-25 | Stop reason: HOSPADM

## 2017-10-23 RX ORDER — LANOLIN 100 %
OINTMENT (GRAM) TOPICAL
Status: DISCONTINUED | OUTPATIENT
Start: 2017-10-23 | End: 2017-10-25 | Stop reason: HOSPADM

## 2017-10-23 RX ORDER — EPHEDRINE SULFATE 50 MG/ML
INJECTION, SOLUTION INTRAMUSCULAR; INTRAVENOUS; SUBCUTANEOUS
Status: DISCONTINUED
Start: 2017-10-23 | End: 2017-10-24 | Stop reason: HOSPADM

## 2017-10-23 RX ORDER — IBUPROFEN 800 MG/1
800 TABLET, FILM COATED ORAL
Status: COMPLETED | OUTPATIENT
Start: 2017-10-23 | End: 2017-10-23

## 2017-10-23 RX ORDER — IBUPROFEN 400 MG/1
400-800 TABLET, FILM COATED ORAL EVERY 6 HOURS PRN
Status: DISCONTINUED | OUTPATIENT
Start: 2017-10-23 | End: 2017-10-25 | Stop reason: HOSPADM

## 2017-10-23 RX ORDER — FENTANYL CITRATE 50 UG/ML
50-100 INJECTION, SOLUTION INTRAMUSCULAR; INTRAVENOUS
Status: DISCONTINUED | OUTPATIENT
Start: 2017-10-23 | End: 2017-10-23

## 2017-10-23 RX ORDER — ONDANSETRON 2 MG/ML
4 INJECTION INTRAMUSCULAR; INTRAVENOUS EVERY 6 HOURS PRN
Status: DISCONTINUED | OUTPATIENT
Start: 2017-10-23 | End: 2017-10-23

## 2017-10-23 RX ORDER — OXYCODONE AND ACETAMINOPHEN 5; 325 MG/1; MG/1
1 TABLET ORAL
Status: DISCONTINUED | OUTPATIENT
Start: 2017-10-23 | End: 2017-10-23

## 2017-10-23 RX ORDER — METHADONE HYDROCHLORIDE 5 MG/5ML
90 SOLUTION ORAL DAILY
Status: DISCONTINUED | OUTPATIENT
Start: 2017-10-24 | End: 2017-10-25 | Stop reason: HOSPADM

## 2017-10-23 RX ORDER — OXYTOCIN 10 [USP'U]/ML
10 INJECTION, SOLUTION INTRAMUSCULAR; INTRAVENOUS
Status: DISCONTINUED | OUTPATIENT
Start: 2017-10-23 | End: 2017-10-25 | Stop reason: HOSPADM

## 2017-10-23 RX ORDER — NALBUPHINE HYDROCHLORIDE 10 MG/ML
2.5-5 INJECTION, SOLUTION INTRAMUSCULAR; INTRAVENOUS; SUBCUTANEOUS EVERY 6 HOURS PRN
Status: DISCONTINUED | OUTPATIENT
Start: 2017-10-23 | End: 2017-10-23

## 2017-10-23 RX ORDER — ACETAMINOPHEN 325 MG/1
650 TABLET ORAL EVERY 4 HOURS PRN
Status: DISCONTINUED | OUTPATIENT
Start: 2017-10-23 | End: 2017-10-23

## 2017-10-23 RX ORDER — SODIUM CHLORIDE, SODIUM LACTATE, POTASSIUM CHLORIDE, CALCIUM CHLORIDE 600; 310; 30; 20 MG/100ML; MG/100ML; MG/100ML; MG/100ML
INJECTION, SOLUTION INTRAVENOUS CONTINUOUS
Status: DISCONTINUED | OUTPATIENT
Start: 2017-10-23 | End: 2017-10-23

## 2017-10-23 RX ORDER — OXYTOCIN/0.9 % SODIUM CHLORIDE 30/500 ML
100-340 PLASTIC BAG, INJECTION (ML) INTRAVENOUS CONTINUOUS PRN
Status: COMPLETED | OUTPATIENT
Start: 2017-10-23 | End: 2017-10-23

## 2017-10-23 RX ORDER — BISACODYL 10 MG
10 SUPPOSITORY, RECTAL RECTAL DAILY PRN
Status: DISCONTINUED | OUTPATIENT
Start: 2017-10-25 | End: 2017-10-25 | Stop reason: HOSPADM

## 2017-10-23 RX ORDER — LIDOCAINE HYDROCHLORIDE 10 MG/ML
INJECTION, SOLUTION EPIDURAL; INFILTRATION; INTRACAUDAL; PERINEURAL
Status: COMPLETED
Start: 2017-10-23 | End: 2017-10-23

## 2017-10-23 RX ORDER — AMOXICILLIN 250 MG
1-2 CAPSULE ORAL 2 TIMES DAILY
Status: DISCONTINUED | OUTPATIENT
Start: 2017-10-23 | End: 2017-10-25 | Stop reason: HOSPADM

## 2017-10-23 RX ORDER — LIDOCAINE HYDROCHLORIDE AND EPINEPHRINE 15; 5 MG/ML; UG/ML
INJECTION, SOLUTION EPIDURAL PRN
Status: DISCONTINUED | OUTPATIENT
Start: 2017-10-23 | End: 2017-10-23

## 2017-10-23 RX ORDER — HYDROCORTISONE 2.5 %
CREAM (GRAM) TOPICAL 3 TIMES DAILY PRN
Status: DISCONTINUED | OUTPATIENT
Start: 2017-10-23 | End: 2017-10-25 | Stop reason: HOSPADM

## 2017-10-23 RX ORDER — MISOPROSTOL 200 UG/1
TABLET ORAL
Status: DISCONTINUED
Start: 2017-10-23 | End: 2017-10-24 | Stop reason: HOSPADM

## 2017-10-23 RX ORDER — VALACYCLOVIR HYDROCHLORIDE 500 MG/1
500 TABLET, FILM COATED ORAL 2 TIMES DAILY
Status: DISCONTINUED | OUTPATIENT
Start: 2017-10-23 | End: 2017-10-23

## 2017-10-23 RX ORDER — NALOXONE HYDROCHLORIDE 0.4 MG/ML
.1-.4 INJECTION, SOLUTION INTRAMUSCULAR; INTRAVENOUS; SUBCUTANEOUS
Status: DISCONTINUED | OUTPATIENT
Start: 2017-10-23 | End: 2017-10-25 | Stop reason: HOSPADM

## 2017-10-23 RX ORDER — CARBOPROST TROMETHAMINE 250 UG/ML
250 INJECTION, SOLUTION INTRAMUSCULAR
Status: DISCONTINUED | OUTPATIENT
Start: 2017-10-23 | End: 2017-10-23

## 2017-10-23 RX ORDER — NALOXONE HYDROCHLORIDE 0.4 MG/ML
.1-.4 INJECTION, SOLUTION INTRAMUSCULAR; INTRAVENOUS; SUBCUTANEOUS
Status: DISCONTINUED | OUTPATIENT
Start: 2017-10-23 | End: 2017-10-23

## 2017-10-23 RX ORDER — EPHEDRINE SULFATE 50 MG/ML
5 INJECTION, SOLUTION INTRAMUSCULAR; INTRAVENOUS; SUBCUTANEOUS
Status: DISCONTINUED | OUTPATIENT
Start: 2017-10-23 | End: 2017-10-23

## 2017-10-23 RX ORDER — OXYTOCIN 10 [USP'U]/ML
10 INJECTION, SOLUTION INTRAMUSCULAR; INTRAVENOUS
Status: DISCONTINUED | OUTPATIENT
Start: 2017-10-23 | End: 2017-10-23

## 2017-10-23 RX ADMIN — Medication 8 ML: at 16:49

## 2017-10-23 RX ADMIN — LIDOCAINE HYDROCHLORIDE,EPINEPHRINE BITARTRATE 3 ML: 15; .005 INJECTION, SOLUTION EPIDURAL; INFILTRATION; INTRACAUDAL; PERINEURAL at 16:49

## 2017-10-23 RX ADMIN — LIDOCAINE HYDROCHLORIDE 15 ML: 10 INJECTION, SOLUTION EPIDURAL; INFILTRATION; INTRACAUDAL; PERINEURAL at 22:25

## 2017-10-23 RX ADMIN — VALACYCLOVIR HYDROCHLORIDE 500 MG: 500 TABLET, FILM COATED ORAL at 19:44

## 2017-10-23 RX ADMIN — SODIUM CHLORIDE, POTASSIUM CHLORIDE, SODIUM LACTATE AND CALCIUM CHLORIDE 1000 ML: 600; 310; 30; 20 INJECTION, SOLUTION INTRAVENOUS at 16:05

## 2017-10-23 RX ADMIN — OXYTOCIN-SODIUM CHLORIDE 0.9% IV SOLN 30 UNIT/500ML 340 ML/HR: 30-0.9/5 SOLUTION at 22:24

## 2017-10-23 RX ADMIN — BENZOCAINE, MENTHOL 1 LOZENGE: 15; 3.6 LOZENGE ORAL at 20:03

## 2017-10-23 RX ADMIN — Medication 10 ML/HR: at 16:49

## 2017-10-23 RX ADMIN — IBUPROFEN 800 MG: 800 TABLET ORAL at 22:52

## 2017-10-23 RX ADMIN — SODIUM CHLORIDE, POTASSIUM CHLORIDE, SODIUM LACTATE AND CALCIUM CHLORIDE: 600; 310; 30; 20 INJECTION, SOLUTION INTRAVENOUS at 17:05

## 2017-10-23 RX ADMIN — FENTANYL CITRATE 100 MCG: 50 INJECTION, SOLUTION INTRAMUSCULAR; INTRAVENOUS at 15:47

## 2017-10-23 NOTE — IP AVS SNAPSHOT
[facility]    [address] [facility] [address]    Phone:  [phone]                                       After Visit Summary   10/23/2017    Giselle Mackenzie    MRN: [id number]           After Visit Summary Signature Page     I have received my discharge instructions, and my questions have been answered. I have discussed any challenges I see with this plan with the nurse or doctor.    ..........................................................................................................................................  Patient/Patient Representative Signature      ..........................................................................................................................................  Patient Representative Print Name and Relationship to Patient    ..................................................               ................................................  Date                                            Time    ..........................................................................................................................................  Reviewed by Signature/Title    ...................................................              ..............................................  Date                                                            Time

## 2017-10-23 NOTE — PLAN OF CARE
Problem: Patient Care Overview  Goal: Plan of Care/Patient Progress Review  Outcome: Improving  Patient reporting increased pain and discomfort.  Requesting an epidural for pain relief.  MDA called and in room at 1620. Patient and procedure correctly identified/ verified with MDA. Consent signed. 1000 mL fluid bolus given prior to epidural placement.  Epidural placed by MDA without complication. Test dose/ bolus given by MDA, patient tolerated well. Patient reports pain relief after procedure

## 2017-10-23 NOTE — IP AVS SNAPSHOT
MRN:[id number]                      After Visit Summary   10/23/2017    Giselle Mackenize    MRN: [id number]           Thank you!     Thank you for choosing [place] for your care. Our goal is always to provide you with excellent care. Hearing back from our patients is one way we can continue to improve our services. Please take a few minutes to complete the written survey that you may receive in the mail after you visit with us. Thank you!        Patient Information     Date Of Birth          1985        Designated Caregiver       Most Recent Value    Caregiver    Will someone help with your care after discharge? no      About your hospital stay     You were admitted on:  October 23, 2017 [facility]    You were discharged on:  October 25, 2017        Reason for your hospital stay       Maternity care                  Who to Call     For medical emergencies, please call 911.  For non-urgent questions about your medical care, please call your primary care provider or clinic, [phone]          Attending Provider     Provider Angeles Berumen MD OB/Gyn       Primary Care Provider Office Phone # Fax #    Mary Carter PA-C [phone]      After Care Instructions     Activity       Review discharge instructions            Diet       Resume previous diet            Discharge Instructions - Gestational diabetic patients       Gestational diabetic patients to follow up for fasting blood sugar and 2 hour 75gm glucose load at 6 weeks postpartum.            Discharge Instructions - Hypertensive disorders patients       High Blood pressure patients to follow up in clinic or via home care within one week for a blood pressure check            Discharge Instructions - Postpartum visit       Schedule 1 week mood check, and a postpartum visit with placement of paragard IUD with your provider and return to clinic in 6 weeks. Also schedule  appointment with GI to discuss treatment of HepC.                  Follow-up Appointments     Follow Up and recommended labs and tests       1 week mood check and 6 week postpartum visit with placement of paragard IUD                  Your next 10 appointments already scheduled     Dec 19, 2017  8:30 AM CST   Lab with  LAB   [facility] ([facility])    [address] [place] [address]  [address] [facility] [address] [phone]            Dec 19, 2017  9:20 AM CST   (Arrive by 9:05 AM)   New General Liver with Von Montesinos MD   [facility] Hepatology ([facility])    [address] [place] [address]  [address] [facility] [id number] [phone]              Further instructions from your care team       Postpartum Vaginal Delivery Instructions    Activity       Ask family and friends for help when you need it.    Do not place anything in your vagina for 6 weeks.    You are not restricted on other activities, but take it easy for a few weeks to allow your body to recover from delivery.  You are able to do any activities you feel up to that point.    No driving until you have stopped taking your pain medications (usually two weeks after delivery).     Call your health care provider if you have any of these symptoms:       Increased pain, swelling, redness, or fluid around your stiches from an episiotomy or perineal tear.    A fever above 100.4 F (38 C) with or without chills when placing a thermometer under your tongue.    You soak a sanitary pad with blood within 1 hour, or you see blood clots larger than a golf ball.    Bleeding that lasts more than 6 weeks.    Vaginal discharge that smells bad.    Severe pain, cramping or tenderness in your lower belly area.    A need to urinate more frequently (use the toilet more often), more urgently (use the toilet very quickly), or it burns when you urinate.    Nausea and vomiting.    Redness, swelling or pain around a vein in  "your leg.    Problems breastfeeding or a red or painful area on your breast.    Chest pain and cough or are gasping for air.    Problems coping with sadness, anxiety, or depression.  If you have any concerns about hurting yourself or the baby, call your provider immediately.     You have questions or concerns after you return home.     Keep your hands clean:  Always wash your hands before touching your perineal area and stitches.  This helps reduce your risk of infection.  If your hands aren't dirty, you may use an alcohol hand-rub to clean your hands. Keep your nails clean and short.        Pending Results     Date and Time Order Name Status Description    10/25/2017 1000 Beta strep group A culture In process     10/23/2017 1412 Hepatitis C RNA quantitative In process             Statement of Approval     Ordered          10/25/17 1122  I have reviewed and agree with all the recommendations and orders detailed in this document.  EFFECTIVE NOW     Approved and electronically signed by:  Angeles Szymanski MD             Admission Information     Date & Time Provider Department Dept. Phone    10/23/2017 Angeles Thomas MD [facility] [phone]      Your Vitals Were     Blood Pressure Pulse Temperature Respirations Height Weight    138/78 82 98  F (36.7  C) (Oral) 16 1.6 m (5' 3\") 82.6 kg (182 lb)    Pulse Oximetry BMI (Body Mass Index)                99% 32.24 kg/m2          MyChart Information     [facility] gives you secure access to your electronic health record. If you see a primary care provider, you can also send messages to your care team and make appointments. If you have questions, please call your primary care clinic.  If you do not have a primary care provider, please call [phone] and they will assist you.        Care EveryWhere ID     This is your Care EveryWhere ID. This could be used by other organizations to access your [place] medical records  [id number]        Equal Access to " Services     [facility]: Hadii aad callie Crook, waaxda luqadaha, qaybta kaalmamichel metzger, hafsa bernabe . So [facility] [phone].    ATENCIÓN: Si diane kim, tiene a lewis disposición servicios gratuitos de asistencia lingüística. Llame al [phone].    We comply with applicable federal civil rights laws and [place] laws. We do not discriminate on the basis of race, color, national origin, age, disability, sex, sexual orientation, or gender identity.               Review of your medicines      START taking        Dose / Directions    acetaminophen 325 MG tablet   Commonly known as:  TYLENOL        Dose:  650 mg   Take 2 tablets (650 mg) by mouth every 4 hours as needed for mild pain or fever (greater than or equal to 38? C /100.4? F (oral) or 38.5? C/ 101.4? F (core).)   Quantity:  60 tablet   Refills:  0       ferrous gluconate 324 (38 FE) MG tablet   Commonly known as:  FERGON        Dose:  324 mg   Take 1 tablet (324 mg) by mouth daily (with breakfast)   Quantity:  60 tablet   Refills:  0       ibuprofen 600 MG tablet   Commonly known as:  ADVIL/MOTRIN        Dose:  600 mg   Take 1 tablet (600 mg) by mouth every 6 hours as needed for other (cramping)   Quantity:  60 tablet   Refills:  0       senna-docusate 8.6-50 MG per tablet   Commonly known as:  SENOKOT-S;PERICOLACE        Dose:  1-2 tablet   Take 1-2 tablets by mouth 2 times daily as needed for constipation   Quantity:  60 tablet   Refills:  0         CONTINUE these medicines which have NOT CHANGED        Dose / Directions    bisacodyl 10 MG Suppository   Commonly known as:  DULCOLAX   Used for:  Constipation during pregnancy, third trimester        Dose:  10 mg   Place 1 suppository (10 mg) rectally daily as needed for constipation   Quantity:  25 suppository   Refills:  1       DESITIN Oint        Apply to sore areas externally 3 times a day.   Quantity:  99 g   Refills:  0       hydrOXYzine 50 MG capsule    Commonly known as:  VISTARIL   Used for:  Supervision of high-risk pregnancy of young multigravida        Dose:  50 mg   Take 1 capsule (50 mg) by mouth nightly as needed (sleep)   Quantity:  30 capsule   Refills:  3       KEFLEX PO        Take by mouth 3 times daily   Refills:  0       METHADONE HCL PO        Dose:  90 mg   Take 90 mg by mouth daily   Refills:  0       ondansetron 4 MG ODT tab   Commonly known as:  ZOFRAN ODT   Used for:  Supervision of high-risk pregnancy of young multigravida        Dose:  4-8 mg   Take 1-2 tablets (4-8 mg) by mouth every 8 hours as needed for nausea   Quantity:  20 tablet   Refills:  1       prenatal multivitamin plus iron 27-0.8 MG Tabs per tablet   Used for:  Supervision of high-risk pregnancy of young multigravida        Dose:  1 tablet   Take 1 tablet by mouth daily   Quantity:  100 tablet   Refills:  3       psyllium 0.52 G capsule   Used for:  Constipation during pregnancy, third trimester        Dose:  1 capsule   Take 1 capsule (0.52 g) by mouth daily   Quantity:  90 capsule   Refills:  3       triamcinolone 0.1 % cream   Commonly known as:  KENALOG   Used for:  Eczema, unspecified type        Apply sparingly to affected area three times daily as needed   Quantity:  80 g   Refills:  0       valACYclovir 500 MG tablet   Commonly known as:  VALTREX   Used for:  HSV (herpes simplex virus) infection        Dose:  500 mg   Take 1 tablet (500 mg) by mouth 2 times daily Until delivery   Quantity:  90 tablet   Refills:  1         STOP taking     metroNIDAZOLE 500 MG tablet   Commonly known as:  FLAGYL                Where to get your medicines      These medications were sent to [place], [place] - [address]  [address] [address]     Phone:  [phone]     acetaminophen 325 MG tablet    ferrous gluconate 324 (38 FE) MG tablet    ibuprofen 600 MG tablet    senna-docusate 8.6-50 MG per tablet                Protect  others around you: Learn how to safely use, store and throw away your medicines at www.disposemymeds.org.             Medication List: This is a list of all your medications and when to take them. Check marks below indicate your daily home schedule. Keep this list as a reference.      Medications           Morning Afternoon Evening Bedtime As Needed    acetaminophen 325 MG tablet   Commonly known as:  TYLENOL   Take 2 tablets (650 mg) by mouth every 4 hours as needed for mild pain or fever (greater than or equal to 38? C /100.4? F (oral) or 38.5? C/ 101.4? F (core).)                                bisacodyl 10 MG Suppository   Commonly known as:  DULCOLAX   Place 1 suppository (10 mg) rectally daily as needed for constipation                                DESITIN Oint   Apply to sore areas externally 3 times a day.                                ferrous gluconate 324 (38 FE) MG tablet   Commonly known as:  FERGON   Take 1 tablet (324 mg) by mouth daily (with breakfast)                                hydrOXYzine 50 MG capsule   Commonly known as:  VISTARIL   Take 1 capsule (50 mg) by mouth nightly as needed (sleep)                                ibuprofen 600 MG tablet   Commonly known as:  ADVIL/MOTRIN   Take 1 tablet (600 mg) by mouth every 6 hours as needed for other (cramping)   Last time this was given:  800 mg on 10/25/2017  7:00 AM                                KEFLEX PO   Take by mouth 3 times daily                                METHADONE HCL PO   Take 90 mg by mouth daily   Last time this was given:  90 mg on 10/25/2017  8:39 AM                                ondansetron 4 MG ODT tab   Commonly known as:  ZOFRAN ODT   Take 1-2 tablets (4-8 mg) by mouth every 8 hours as needed for nausea                                prenatal multivitamin plus iron 27-0.8 MG Tabs per tablet   Take 1 tablet by mouth daily                                psyllium 0.52 G capsule   Take 1 capsule (0.52 g) by mouth daily                                 senna-docusate 8.6-50 MG per tablet   Commonly known as:  SENOKOT-S;PERICOLACE   Take 1-2 tablets by mouth 2 times daily as needed for constipation   Last time this was given:  2 tablets on 10/25/2017  8:39 AM                                triamcinolone 0.1 % cream   Commonly known as:  KENALOG   Apply sparingly to affected area three times daily as needed                                valACYclovir 500 MG tablet   Commonly known as:  VALTREX   Take 1 tablet (500 mg) by mouth 2 times daily Until delivery   Last time this was given:  500 mg on 10/23/2017  7:44 PM
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
## 2017-10-23 NOTE — ANESTHESIA PREPROCEDURE EVALUATION
Anesthesia Evaluation       history and physical reviewed .      No history of anesthetic complications          ROS/MED HX    ENT/Pulmonary:     (+)asthma , . .    Neurologic:       Cardiovascular:         METS/Exercise Tolerance:     Hematologic:         Musculoskeletal:         GI/Hepatic:     (+) GERD       Renal/Genitourinary:         Endo:         Psychiatric:         Infectious Disease:         Malignancy:         Other:                     Physical Exam      Airway   Mallampati: II  TM distance: > 3 FB  Neck ROM: full    Dental     Cardiovascular       Pulmonary                     Anesthesia Plan      History & Physical Review      ASA Status:  .  OB Epidural Asa: 2            Postoperative Care      Consents  Anesthetic plan, risks, benefits and alternatives discussed with:  Patient..

## 2017-10-23 NOTE — PLAN OF CARE
Problem: Patient Care Overview  Goal: Plan of Care/Patient Progress Review  Outcome: Improving  Pt arrived at birthplace for rule out labor. SVE per Dr Thomas 4/90/-1. Pt admitted to unit requesting an epidural. Difficulty getting IV access, required ultrasound. MDA called to place epidural

## 2017-10-23 NOTE — ANESTHESIA PROCEDURE NOTES
Epidural Procedure Note    Staff:     Anesthesiologist:  CEE CALLAHAN  Location: OB   Pre-procedure checklist:   patient identified, IV checked, site marked, risks and benefits discussed, informed consent, monitors and equipment checked, pre-op evaluation, at physician/surgeon's request and post-op pain management      Correct Patient: Yes      Correct Position: Yes      Correct Site: Yes      Correct Procedure: Yes      Correct Laterality:  Yes    Site Marked:  Yes  Procedure:     Procedure:  Epidural catheter    ASA:  2    Position:  Sitting    Sterile Prep: chloraprep      Insertion site:  L3-4    Local skin infiltration:  1% lidocaine    Approach:  Midline    Needle gauge (G):  17    Needle Length (in):  3.5    Block Needle Type:  Touhy    Injection Technique:  LORT saline    DALJIT at (cm):  5.5    Attempts:  1    Redirects:  0    Catheter gauge (G):  19    Catheter threaded easily: Yes      Threaded to cm at skin:  11    Paresthesias:  No    Aspiration negative for Heme or CSF: Yes      Test dose (mL):  3     Local anesthetic:  Lidocaine 1.5% w/ 1:200,000 epinephrine    Test dose time:  16:49    Test dose negative for signs of intravascular, subdural or intrathecal injection: Yes

## 2017-10-24 LAB
GLUCOSE BLDC GLUCOMTR-MCNC: 83 MG/DL (ref 70–99)
HGB BLD-MCNC: 10.4 G/DL (ref 11.7–15.7)
HIV 1+2 AB+HIV1 P24 AG SERPL QL IA: NONREACTIVE
RPR SER-TITR: 2 {TITER}
T PALLIDUM IGG+IGM SER QL: POSITIVE

## 2017-10-24 PROCEDURE — 85018 HEMOGLOBIN: CPT | Performed by: STUDENT IN AN ORGANIZED HEALTH CARE EDUCATION/TRAINING PROGRAM

## 2017-10-24 PROCEDURE — 25000132 ZZH RX MED GY IP 250 OP 250 PS 637: Performed by: STUDENT IN AN ORGANIZED HEALTH CARE EDUCATION/TRAINING PROGRAM

## 2017-10-24 PROCEDURE — 36415 COLL VENOUS BLD VENIPUNCTURE: CPT | Performed by: STUDENT IN AN ORGANIZED HEALTH CARE EDUCATION/TRAINING PROGRAM

## 2017-10-24 PROCEDURE — 12000030 ZZH R&B OB INTERMEDIATE UMMC

## 2017-10-24 PROCEDURE — 00000146 ZZHCL STATISTIC GLUCOSE BY METER IP

## 2017-10-24 RX ADMIN — IBUPROFEN 800 MG: 400 TABLET ORAL at 14:33

## 2017-10-24 RX ADMIN — IBUPROFEN 800 MG: 400 TABLET ORAL at 20:35

## 2017-10-24 RX ADMIN — SENNOSIDES AND DOCUSATE SODIUM 2 TABLET: 8.6; 5 TABLET ORAL at 08:23

## 2017-10-24 RX ADMIN — METHADONE HYDROCHLORIDE 90 MG: 5 SOLUTION ORAL at 10:01

## 2017-10-24 RX ADMIN — SENNOSIDES AND DOCUSATE SODIUM 2 TABLET: 8.6; 5 TABLET ORAL at 20:35

## 2017-10-24 RX ADMIN — IBUPROFEN 800 MG: 400 TABLET ORAL at 08:23

## 2017-10-24 NOTE — PLAN OF CARE
Problem: Patient Care Overview  Goal: Plan of Care/Patient Progress Review  Outcome: Improving  Pt reporting adequate pain control at this time. AROM'd  with clear pink fluid.

## 2017-10-24 NOTE — PROGRESS NOTES
[facility]   Post-partum Note    Name:  Giselle Mackenzie  MRN: [id number]    S: Patient is tired but feeling well overall.  Pain is controlled. Denies dizziness, SOB, chest pain, nausea or vomiting.  Lochia a little more than menses.  Breast feeding.  Plans paragard IUD for postpartum contraception.     O:   Patient Vitals for the past 24 hrs:   BP Temp Temp src Pulse Resp SpO2 Height Weight   10/24/17 0401 122/79 98.3  F (36.8  C) Oral 95 20 98 % - -   10/24/17 0300 135/69 98.4  F (36.9  C) Oral 93 20 98 % - -   10/24/17 0045 128/76 98.4  F (36.9  C) Oral 83 20 99 % - -   10/24/17 0015 122/63 - - - - - - -   10/23/17 2355 122/65 - - - - 98 % - -   10/23/17 2340 135/58 - - - - 98 % - -   10/23/17 2325 132/82 - - - - 99 % - -   10/23/17 2310 156/65 98  F (36.7  C) Oral - - 99 % - -   10/23/17 2255 151/89 - - - - 99 % - -   10/23/17 2240 116/67 - - - - 100 % - -   10/23/17 2225 140/71 - - - - 100 % - -   10/23/17 2145 151/72 - - - - 99 % - -   10/23/17 2115 132/58 - - - - 98 % - -   10/23/17 2045 112/60 - - - - 99 % - -   10/23/17 2030 - 98.3  F (36.8  C) Oral - - - - -   10/23/17 2015 129/82 - - - - 99 % - -   10/23/17 1945 117/67 - - - - 99 % - -   10/23/17 1915 115/81 98.1  F (36.7  C) Oral - 18 99 % - -   10/23/17 1841 137/75 - - 78 - 100 % - -   10/23/17 1810 112/74 - - - - - - -   10/23/17 1809 - - - - - 99 % - -   10/23/17 1805 119/87 - - - - 99 % - -   10/23/17 1800 - - - - - 99 % - -   10/23/17 1755 118/68 - - - - - - -   10/23/17 1754 - - - - - 99 % - -   10/23/17 1750 115/64 - - - - - - -   10/23/17 1745 120/73 - - - - 99 % - -   10/23/17 1740 123/74 - - - - - - -   10/23/17 1739 124/72 - - - - - - -   10/23/17 1730 133/81 - - - - 99 % - -   10/23/17 1725 131/76 - - - - 99 % - -   10/23/17 1720 120/60 - - - - 99 % - -   10/23/17 1715 117/58 - - - - 99 % - -   10/23/17 1712 119/65 - - - - - - -   10/23/17 1710 129/82 - - - - - - -   10/23/17 1709 - - - - - 100 % - -   10/23/17 1708  "123/72 - - - - - - -   10/23/17 1706 116/58 - - - - - - -   10/23/17 1705 - - - - - 99 % - -   10/23/17 1704 119/67 - - - - - - -   10/23/17 1701 120/73 - - - - - - -   10/23/17 1659 119/68 - - - - 100 % - -   10/23/17 1657 123/70 - - - - - - -   10/23/17 1656 120/69 - - - - - - -   10/23/17 1655 123/67 - - - - 100 % - -   10/23/17 1653 122/81 - - - - - - -   10/23/17 1650 126/86 - - - - 100 % - -   10/23/17 1648 133/81 - - - - - - -   10/23/17 1546 120/68 98.1  F (36.7  C) Oral - - - - -   10/23/17 1323 121/78 98.4  F (36.9  C) Oral - 18 - 1.6 m (5' 3\") 82.6 kg (182 lb)     Gen:  Resting comfortably, NAD  CV:  RRR, no murmur  Pulm:  CTAB, no wheezes  Abd:  Soft, appropriately ttp, non-distended.Fundus at the umbilicus, firm and non-tender.  Ext:  non-tender, trace LE edema b/l    I/O last 3 completed shifts:  In: -   Out: 1017 [Urine:1000; Blood:17]    Hgb:   Hemoglobin   Date Value Ref Range Status   10/23/2017 11.6 (L) 11.7 - 15.7 g/dL Final       Assessment/Plan:  32 year old  on PPD #1 s/p .    Postpartum cares:   - continue with routine postpartum management  Pain: Well-controlled with ibuprofen and tylenol  Hgb: 11.6>EBL 17> AM Hgb pending   Rh: positive  Rubella: immune  Feed: Breast  BC: paragard  Dispo: DC PPD#2    HepC:  - GI PP to discuss treatment  - viral load pending    No GCT during PNC:  - FBG pending, patient ate overnight and 8 hours after last food intake will be around 1000    Bipolar disorder w/ history of suicide attempt:  - SW consult    Hx substance abuse:  - Continue methodone 90mg daily  - SW consult    Antonia Reddy MD PhD  Ob/Gyn PGY-2  10/24/2017 5:57 AM      Physician Attestation   I, Claribel Amaya, personally examined and evaluated this patient.  I discussed the patient with the resident and care team, and agree with the assessment and plan of care as documented in the resident s note of 2017  [date].      I personally reviewed vital signs, " medications and labs.  Hemoglobin   Date Value Ref Range Status   10/24/2017 10.4 (L) 11.7 - 15.7 g/dL Final   10/23/2017 11.6 (L) 11.7 - 15.7 g/dL Final   ]   Key findings: patient very tired, but medically stable PPD #1.  Breast feeding, but baby is on 72 hr hold.  discussed timing of d/c and patient hoping for boarding room until baby is d/c'd. Worried that the separation with trigger PP depression, which she struggled with in the past. Will have day team tomorrow assess options for patient.    Claribel Amaya  Date of Service (when I saw the patient): 10/24/17

## 2017-10-24 NOTE — DISCHARGE SUMMARY
[facility] Discharge Summary    Giselle Mackenzie MRN# [id number]   Age: 32 year old YOB: 1985     Date of Admission:  10/23/2017  Date of Discharge:  10/25/17  Admitting Physician:  Angeles Thomas MD  Discharge Physician:  Angeles Szymanski MD    Admit Dx:   -  at 37w1d   - Hep C positive  - Bipolar with hx of suicide attempt   - HSV  - Syphilis, s/p treatment  - Hx of substance abuse  - Hx of gDM x2  - Hx gHTN x2      Discharge Dx:  - Same as above, s/p     Procedures:  - Spontaneous vaginal delivery  - Epidural analgesia    Admit HPI/Labor Course:  Giselle Mackenzie is a now  who was admitted in spontaneous labor. Her labor was augmented with AROM. She received and epidural for pain control. She delivered a male infant on 10/23/17 at 2220 with APGARs of 7/9 at 1 and 5 minutes, weighing 2370 g. Cephalic presentation with direct OA position. Nuchal cord x1. Placenta delivered intact with 3-vessel cord. A 1st degree laceration was present and repaired with 3-0 vicryl. Uterus was firm with pitocin running through the IV and vaginal bleeding scant. QBL 17 ml. Dr. Thomas was present for the entire delivery.   Please see her Admission H&P and Delivery Summary for further details.    Postpartum Course:  Her postpartum course was uncomplicated. On PPD#2, she was meeting all of her postpartum goals and deemed stable for discharge. She was voiding without difficulty, tolerating a regular diet without nausea and vomiting, her pain was well controlled on oral pain medicines and her lochia was appropriate. Her hemoglobin prior to delivery was 11.6 and after delivery was 10.4. Her Rh status was positive, and Rhogam was not indicated.     Discharge Medications:     Review of your medicines      START taking       Dose / Directions    acetaminophen 325 MG tablet   Commonly known as:  TYLENOL        Dose:  650 mg   Take 2 tablets (650 mg) by mouth every 4 hours as needed  for mild pain or fever (greater than or equal to 38  C /100.4  F (oral) or 38.5  C/ 101.4  F (core).)   Quantity:  60 tablet   Refills:  0       ferrous gluconate 324 (38 FE) MG tablet   Commonly known as:  FERGON        Dose:  324 mg   Take 1 tablet (324 mg) by mouth daily (with breakfast)   Quantity:  60 tablet   Refills:  0       ibuprofen 600 MG tablet   Commonly known as:  ADVIL/MOTRIN        Dose:  600 mg   Take 1 tablet (600 mg) by mouth every 6 hours as needed for other (cramping)   Quantity:  60 tablet   Refills:  0       senna-docusate 8.6-50 MG per tablet   Commonly known as:  SENOKOT-S;PERICOLACE        Dose:  1-2 tablet   Take 1-2 tablets by mouth 2 times daily as needed for constipation   Quantity:  60 tablet   Refills:  0         CONTINUE these medicines which have NOT CHANGED       Dose / Directions    bisacodyl 10 MG Suppository   Commonly known as:  DULCOLAX   Used for:  Constipation during pregnancy, third trimester        Dose:  10 mg   Place 1 suppository (10 mg) rectally daily as needed for constipation   Quantity:  25 suppository   Refills:  1       DESITIN Oint        Apply to sore areas externally 3 times a day.   Quantity:  99 g   Refills:  0       hydrOXYzine 50 MG capsule   Commonly known as:  VISTARIL   Used for:  Supervision of high-risk pregnancy of young multigravida        Dose:  50 mg   Take 1 capsule (50 mg) by mouth nightly as needed (sleep)   Quantity:  30 capsule   Refills:  3       KEFLEX PO        Take by mouth 3 times daily   Refills:  0       METHADONE HCL PO        Dose:  90 mg   Take 90 mg by mouth daily   Refills:  0       ondansetron 4 MG ODT tab   Commonly known as:  ZOFRAN ODT   Used for:  Supervision of high-risk pregnancy of young multigravida        Dose:  4-8 mg   Take 1-2 tablets (4-8 mg) by mouth every 8 hours as needed for nausea   Quantity:  20 tablet   Refills:  1       prenatal multivitamin plus iron 27-0.8 MG Tabs per tablet   Used for:  Supervision of  high-risk pregnancy of young multigravida        Dose:  1 tablet   Take 1 tablet by mouth daily   Quantity:  100 tablet   Refills:  3       psyllium 0.52 G capsule   Used for:  Constipation during pregnancy, third trimester        Dose:  1 capsule   Take 1 capsule (0.52 g) by mouth daily   Quantity:  90 capsule   Refills:  3       triamcinolone 0.1 % cream   Commonly known as:  KENALOG   Used for:  Eczema, unspecified type        Apply sparingly to affected area three times daily as needed   Quantity:  80 g   Refills:  0       valACYclovir 500 MG tablet   Commonly known as:  VALTREX   Used for:  HSV (herpes simplex virus) infection        Dose:  500 mg   Take 1 tablet (500 mg) by mouth 2 times daily Until delivery   Quantity:  90 tablet   Refills:  1         STOP taking          metroNIDAZOLE 500 MG tablet   Commonly known as:  FLAGYL                Where to get your medicines      These medications were sent to [facility] [address]  [address] [facility], [facility] [address]     Phone:  [phone]      acetaminophen 325 MG tablet     ferrous gluconate 324 (38 FE) MG tablet     ibuprofen 600 MG tablet     senna-docusate 8.6-50 MG per tablet               Discharge/Disposition:  Giselle Mackenzie was discharged to home in stable condition with the following instructions/medications:  1) Call for temperature > 100.4, bright red vaginal bleeding >1 pad an hour x 2 hours, foul smelling vaginal discharge, pain not controlled by usual oral pain meds, persistent nausea and vomiting not controlled on medications  2) She desired paragard IUD for contraception.  3) For feeding she decided to breast feed.  4) She was instructed to follow-up with her primary OB in 1 week for a mood check and 6 weeks for a routine postpartum visit and placement of paragard IUD. She should also follow up with GI to discuss treatment of HepC.     Aparna Fernandes MD  OB/GYN Resident PGY4  Pager  [phone]  10/25/17

## 2017-10-24 NOTE — PLAN OF CARE
Data: Giselle Mackenzie transferred to [id number] via wheelchair at 0025. Baby transferred via parent's arms.  Action: Receiving unit notified of transfer: Yes. Patient and family notified of room change. Report given to Elana at 0035. Belongings sent to receiving unit. Accompanied by Registered Nurse. Oriented patient to surroundings. Call light within reach. ID bands double-checked with receiving RN.  Response: Patient tolerated transfer and is stable.

## 2017-10-24 NOTE — PHARMACY
Methadone Clinic Information Note    Clinic Name: [facility]  Clinic Location ([facility]): [address] [place] [address]  Phone Number: [phone]  Current dose: 90 mg daily   Prescriber's Name: Dr. Pedro  Last dose: 10/23/17 (no home supply)    Erma DongD.

## 2017-10-24 NOTE — PROGRESS NOTES
Transfer note:   Pt transferred from L &D via W/C with baby in arms. IV Pit infusing. Bands checked with L&D nurse. Mom in no distress at this time.

## 2017-10-24 NOTE — CONSULTS
"[facility]  MATERNAL CHILD HEALTH   SOCIAL WORK INITIAL NOTE      DATA:     Giselle Mackenzie is a 32 year old, single, , woman who just delivered a baby boy last night at [facility]. Giselle has a complex psychosocial history consisting of a history of opioid addiction, polysubstance abuse, homelessness/prostitution,  and several mental health diagnoses including previous known suicide attempts.     Mental Health: Per her chart, she has been diagnosed with PTSD, OCD, ADD, Bipolar Disorder, and has a rule out of a Cluster B Personality Disorder. She has a past history of suicide attempts. Giselle reports she has been mostly anxious the past few weeks and postpartum, but she is not concerned about her level of anxiety or feel like she needs additional services outside of [place] to help manage her anxiety at this time. She reports she is not having more significant symptoms such as depression, worthlessness, or SI/HI/self harm. Giselle reports she has had Postpartum Depression following each of her two other pregnancies, so she is aware of the signs/symptoms and knows she is at high risk for developing symptoms in this postpartum period as well.     She reports she is not on any mental health medications at this time, and is not currently interested in any information on medication for mental health as a treatment option. She reports she has \"been on enough medications\" in her life that \"didn't work very well.\" She is followed by a therapist and psychiatrist at [facility] who help her manage her symptoms. She reports that she has been able to stop mental health medication with a combination of therapy, exercise and nutrition adjustments.     Chemical Health: Per the pt's chart, she has used methadone, cocaine and heroin during this pregnancy. During this pregnancy, she was committed to [facility] for 5 weeks due to her polysubstance abuse. She was started on " "methadone during this inpatient stay, which she continues on a maintenance dosing daily. She was discharged from [facility] on 17 at 27+2 weeks gestation to [facility] and eventually was admitted to [facility]. She then reestablished prenatal care with [facility]. Her most recent tox screen from this delivery on 10/23/17 was negative for all substances used.     Giselle reports that she is two months sober from illicit drugs and stable on her methadone treatment plan. Giselle reports that feels \"the best [she] ever has.\" She reports being stable in her sobriety in treatment and committed to staying sober so she can parent.     Per Pharmacy Angela Graf, Pharm.D.:  Methadone Dosage Note  Clinic Name: [facility]  Clinic Location ([facility]): [address] [place] [address]  Phone Number: [phone]  Current dose: 90 mg daily   Prescriber's Name: Dr. Pedro  Last dose: 10/23/17 (no home supply)    Child Protection: Per the chart her 2 older children (born  and ) are adopted out and she is not parenting them. [facility] CPS worker ELIEZER Perez, BO (cell phone is [phone]) was assigned prenatally to Giselle's case and will continue to follow as she investigates for prenatal exposure to a . Ivette is requesting that a 72 Police Health and Welfare Hold be placed today on this baby. 72 Police Hold placed at 10:12AM by Otho  Luis Hayward. Ivette anticipates this baby will discharge into a foster placement and not with mom at this time, but court on Friday will ultimately determine discharge disposition. She is OK with mom and baby rooming in together as long as a sitter is present and mom is NOT allowed to discharge with baby at this time. She did not voice concerns about mom continuing to breastfeed, as her utox was negative on delivery and she has been involved at [facility] in treatment the past " month.       INTERVENTION:     Chart review. Coordination with [facility] CPS worker Ivette. Ivette came to speak with Giselle to alert her to her decision to place baby on a hold and pursue foster care placement for discharge.  engaged in coordination with MD and [facility] staff regarding CPS requests and 72 hour hold. Placed 72 hour Hold with [facility]  Luis Hayward today at 10:12AM. A copy was provided to the pt, the original was placed in baby's chart, and a copy was faxed to [facility] CPS Intake line along with a new written report as requested by Ivette with CPS.     Met with Giselle bedside to assess for needs and offer support. Provided the copy of the hold paperwork to mom and spent time explaining our facility's expectations/rules regarding 72 hour hold. Let her know that as CPS has given us the OK, we'd try our best to continue to allow her to room in with baby along with a sitter (), but this is something dependent on staffing availability. Explained how the hold works and what it will mean for discharge (dependent on 's ruling in court expected to be Friday). Encouraged her to continue to do cares/bond/feed with baby. Briefly reviewed boarding situtation if she is discharged prior to baby and/or if baby is transferred to the NICU. Let her know we would make every attempt to room them together still, but this is depending on staffing/room availability as well. Assessed for concerns/questions. Giselle reported that her CPS worker did explain all of this and she had no questions.     Assessed for current chemical health/mental health concerns. Gathered a brief treatment history. Assessed for current symptoms/signs of mental health issues. Provided extensive psychoeducation on  mood and anxiety disorders given her history and the current psychosocial stressors. Offered additional resources for mental health support (medication evaluation, additional therapy/support groups). Mom  declined as she has established providers at [place].     Mom complained of anxiety/sleeplessness. Let her know she has access to the  nursery if she needs to sleep for a few hours today. Given her delivery late last night she has not had much time to rest yet.     ASSESSMENT:     Complicated psychosocial case involving maternal substance abuse, prenatal exposure and CPS involvement. Mom was appropriate and engaged with me during our discussion. Mom was oriented with appropriate affect and mood today. Good eye contact. Appears well informed about the child protection/hold process and potential court decisions around discharge.     PLAN:     72 Hour Police Health and Welfare Hold has been placed, baby NOT to discharge home with mom or with anyone until otherwise directed by [facility] Child Protective Services. SW will continue to follow through Giselle and her baby's admission to the [facility]. It is anticipated baby will remain inpatient for at least 72 hours to medically follow for signs and symptoms of Withdrawal. SW will continue to coordinate with CPS regarding discharge disposition for baby when he is medically cleared for discharge.       Sabine Aguilera [facility]   Social Worker  Maternal Child Health    Phone: [phone]  Pager:  [phone]

## 2017-10-24 NOTE — PROGRESS NOTES
[facility]  Labor Progress Note    Subjective:  Patient is resting comfortably. The epidural is working well.     Objective:   Patient Vitals for the past 4 hrs:   BP Temp Temp src Pulse SpO2   10/23/17 1841 137/75 - - 78 100 %   10/23/17 1810 112/74 - - - -   10/23/17 1809 - - - - 99 %   10/23/17 1805 119/87 - - - 99 %   10/23/17 1800 - - - - 99 %   10/23/17 175 118/68 - - - -   10/23/17 1754 - - - - 99 %   10/23/17 175 115/64 - - - -   10/23/17 1745 120/73 - - - 99 %   10/23/17 1740 123/74 - - - -   10/23/17 173 124/72 - - - -   10/23/17 1730 133/81 - - - 99 %   10/23/17 1725 131/76 - - - 99 %   10/23/17 1720 120/60 - - - 99 %   10/23/17 1715 117/58 - - - 99 %   10/23/17 1712 119/65 - - - -   10/23/17 1710 129/82 - - - -   10/23/17 1709 - - - - 100 %   10/23/17 1708 123/72 - - - -   10/23/17 1706 116/58 - - - -   10/23/17 1705 - - - - 99 %   10/23/17 1704 119/67 - - - -   10/23/17 1701 120/73 - - - -   10/23/17 1659 119/68 - - - 100 %   10/23/17 1657 123/70 - - - -   10/23/17 1656 120/69 - - - -   10/23/17 1655 123/67 - - - 100 %   10/23/17 1653 122/81 - - - -   10/23/17 1650 126/86 - - - 100 %   10/23/17 1648 133/81 - - - -   10/23/17 1546 120/68 98.1  F (36.7  C) Oral - -     SVE: /-2, AROM with bloody fluid presetn    FHT: Baseline 125, moderate variability, + accelerations, no decelerations  [place]: 1-2 contractions in 10 minutes    Assessment/Plan:  Ms. Giselle Mackenzie is a 32 year old  at 37w1d by 15w3d, here in spontaneous labor.    Labor:  - AROM performed with bloody fluid, will monitor fetus closely  - Making adequate cervical change  - anticipate   - GBS neg, PCN not indicated    -Fetal Well Being   - Category 1 FHT. Reactive and reassuring   - Cephalic by BSUS. EFW 7.5#   - Continue EFM and [place]    Antonia Reddy MD PhD  OB/GYN Resident, PGY-2  10/23/2017 7:08 PM

## 2017-10-24 NOTE — L&D DELIVERY NOTE
Delivery Summary with Obrien  Delivery Summary    Giselle Mackenzie MRN# [id number]   Age: 32 year old YOB: 1985     Giselle Mackenzie is a now  who was admitted in spontaneous labor at 37w1d. 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 GBS negative. Her labor was augmented with AROM at 1904. She received and epidural for pain control.     She pushed for 6 minutes to deliver a male infant on 10/23/17 at 2220 with APGARs of 7/9 at 1 and 5 minutes, weighing 2370 g (5lb 3.6oz). Cephalic presentation with direct OA position. Nuchal cord x1, delivered through. Category 2 fetal tracing immediately prior to delivery due to variable decelerations. Infant placed on mother's chest where NICU team assessed infant and recommended immediate cord clamping which was performed.     Placenta delivered intact with 3-vessel cord. A 1st degree laceration was noted. It was injected with 1% lidocaine and repaired with 3-0 vicryl. Uterus was firm with pitocin running through the IV and vaginal bleeding scant. QBL 87 ml. Dr. Thomas was present for the entire delivery.     Antonia Reddy MD PhD  Ob/Gyn PGY-2  10/23/2017 10:54 PM    ATTESTATION:   I was present for the entire procedure. I agree with the above note which I have edited to reflect my findings.   Angeles Thomas MD      [facility] Assessment Tool Data    Gestational Age:  Gestational Age: 37w1d     Maternal temperature range:  Temp  Av.2  F (36.8  C)  Min: 98.1  F (36.7  C)  Max: 98.4  F (36.9  C)    Membranes ruptured for:   3h 16m     GBS status:  Lab Results   Component Value Date    GBS Negative 10/11/2017       Antibiotic Status:  Antibiotics         IV Antibiotic Given         Additional Management      Fetal Status Prior to  Delivery      Fetal Status Comments         Sepsis Prebirth  Score:       Sepsis Postbirth Score:       Determination based on clinical exam after birth:       Disposition:            Labor Event Times    Labor onset date:  10/23/17 Onset time:   4:30 PM   Dilation complete date:  10/23/17 Complete time:  10:14 PM   Start pushing date/time:  10/23/2017 2214            Labor Length    1st Stage (hrs):  5 (min):  44   2nd Stage (hrs):  0 (min):  6   3rd Stage (hrs):  0 (min):  4      Labor Events     labor?:  No    steroids:  None   Labor Type:  Spontaneous, Augmentation, AROM   Predominate monitoring during 1st stage:  continuous electronic fetal monitoring      Antibiotics received during labor?:  No      Rupture identifier:  Rupture 1   Rupture date/time: 10/23/17 1904   Rupture type:  Artificial Rupture of Membranes   Fluid color:  Clear, Bloody      Augmentation:  AROM         Delivery/Placenta Date and Time    Delivery Date:  10/23/17 Delivery Time:  10:20 PM   Placenta Date/Time:  10/23/2017 10:24 PM   Oxytocin given at the time of delivery:  after delivery of placenta      Vaginal Counts    Initial count performed by 2 team members:   Two Team Members   Dr. William Christensen, RN          Needles Suture [place] Sponges Instruments   Initial counts 2  5    Added to count  1     Final counts 2 1 5       Placed during labor Accounted for at the end of labor   No    No    No       Final count performed by 2 team members:   Two Team Members   Dr. William Christensen, RN               Apgars    Living status:  Living    1 Minute 5 Minute 10 Minute 15 Minute 20 Minute   Skin color: 0  2       Heart rate: 2  2       Reflex irritability: 2  2       Muscle tone: 2  2       Respiratory effort: 1  1       Total: 7  9          Apgars assigned by:  SAUD LOPEZ CNP      Cord    Vessels:  3 Vessels Complications:  None   Cord Blood Disposition:  Lab Gases Sent?:  Yes          Resuscitation    Methods:  NCPAP, Oximetry, Temp Skin Probe      [place]  "Care at Delivery:  [facility] Delivery Note    Asked by Dr. Mendoza to attend the delivery of this term, male infant with a gestational age of 37 1/7 weeks secondary to variable decels and maternal methadone use.      Infant delivered at 2220 hours on 10/23/2017. Infant placed on mom's chest, dried & stimulated with grimace but poor respiratory effort. Cord clamped. He was placed on a warmer, dried, stimulated, and bulb suctioned with spontaneous respirations noted. Infant then began shallow breathing with no cry with stimulation. CPAP given for about 3 minutes. Pulse oximeter placed on right hand with saturation of 99% and CPAP was discontinued at 5 minutes of age. Infant continued to breath spontaneously with O2 saturations %. Apgars were 7 at one minute and 9 at five minutes of age. Gross PE is WNL except for SGA. Infant was shown to mother and will be transferred to the  nursery for further care.    Faith LOPEZ, CNP 10/23/2017, 10:43 PM   Output in Delivery Room:  Voided, Stool      [place] Measurements    Weight:  5 lb 3.6 oz Length:  1' 6\"   Head circumference:  33 cm       Skin to Skin and Feeding Plan    Skin to skin initiation date/time: 10/23/17 2220   Skin to skin with:  Mother   Skin to skin end date/time: 10/23/17 2221         Delivery (Maternal) (Provider to Complete) ([id number])    Episiotomy:  None   Perineal lacerations:  1st Repaired?:  Yes         Mother's Information  Mother: Mackenzie Giselle MATT #[id number]    Start of Mother's Information     IO Blood Loss  10/23/17 1630 - 10/24/17 0135    Mom's I/O Activity            End of Mother's Information  Mother: Mackenzie Giselle MATT #[id number]            Delivery - Provider to Complete ([id number])    Delivering clinician:  AUGUSTINE MENDOZA   Delivery Type (Choose the 1 that will go to the Birth History):  Vaginal, Spontaneous Delivery                     Other personnel:   Provider Role   ANDREZ POSADAS Resident            Placenta  "   Delayed Cord Clamping:  Done   Immediate Cord Clamping:  Done   Date/Time:  10/23/2017 10:24 PM   Removal:  Spontaneous   Comments:  intact, 3vc   Disposition:  Hospital disposal      Anesthesia    Method:  Epidural         Presentation and Position    Presentation:  Vertex   Position:  Middle Occiput Anterior                    Angeles Thomas MD

## 2017-10-24 NOTE — PLAN OF CARE
"Problem: Postpartum (Vaginal Delivery) (Adult,Obstetrics,Pediatric)  Goal: Signs and Symptoms of Listed Potential Problems Will be Absent, Minimized or Managed (Postpartum)  Signs and symptoms of listed potential problems will be absent, minimized or managed by discharge/transition of care (reference Postpartum (Vaginal Delivery) (Adult,Obstetrics,Pediatric) CPG).   Outcome: No Change  VSS. Afebrile. C/o some pain and medicated with relief. LS clear. Denies headache, blurred vision, SOB, CP. Up to bathroom and voiding. Small amount of vaginal drng noted. BS active. LS clear. Ate two box lunches and some pizza tonight. Denies N/V. Pt is polite but not compliant at times. Asked and reminded x2 to call nurse to do BGs on baby per protocol and pt has not called yet. Pt will just feed baby. Educated on importance of obtaining BGs before feedings. Pt educated on not sleeping in bed with baby, and per pt \" I'm not sleeping right now, and baby doesn't look like he's sleeping either\". She states he just \"chillin\". Pt exposing baby and taking pictures. Discussed importance of baby's age and weight and effect of low temp. Pt states she understands. After 10mins, pt still taking pictures of baby naked. Nurse wrapped baby up and encouraged mom to do skin to skin as much as possible. Will continue to monitor. Will past on to clarify methadone dose with pt's clinic in the morning.       "

## 2017-10-24 NOTE — PROGRESS NOTES
Strip Review:    Baseline 120, moderate variability, + acels, no decels  [place] is not picking up contractions well    A/P:  - Category I, reactive  - will continue to monitor patient  - recheck cervix in 1-2 hours or prn  - will consider pitocin if adequate cervical change is not seen    Antonia Reddy MD PhD  Ob/Gyn PGY-2  10/23/2017 9:19 PM

## 2017-10-24 NOTE — PLAN OF CARE
Problem: Labor (Cervical Ripen, Induct, Augment) (Adult,Obstetrics,Pediatric)  Goal: Signs and Symptoms of Listed Potential Problems Will be Absent, Minimized or Managed (Labor)  Signs and symptoms of listed potential problems will be absent, minimized or managed by discharge/transition of care (reference Labor (Cervical Ripen, Induct, Augment) (Adult,Obstetrics,Pediatric) CPG).   Outcome: Completed Date Met:  10/23/17  Vaginal Delivery Note   of viable Male with Dr. Thomas and Dr. Reddy in attendance.  NICU/Nursery RASHIDA Boucher present.  Infant with spontaneous cry, to mother's abdomen, dried and stimulated; to warmer for lack of respiratory effort.  Placenta delivered with out complication, 2nd degree laceration, with repair, bebeto cares provided.  Mother and baby in stable condition.

## 2017-10-25 VITALS
OXYGEN SATURATION: 99 % | WEIGHT: 182 LBS | DIASTOLIC BLOOD PRESSURE: 78 MMHG | RESPIRATION RATE: 16 BRPM | HEART RATE: 82 BPM | BODY MASS INDEX: 32.25 KG/M2 | SYSTOLIC BLOOD PRESSURE: 138 MMHG | HEIGHT: 63 IN | TEMPERATURE: 98 F

## 2017-10-25 LAB
DEPRECATED S PYO AG THROAT QL EIA: NORMAL
SPECIMEN SOURCE: NORMAL

## 2017-10-25 PROCEDURE — 25000132 ZZH RX MED GY IP 250 OP 250 PS 637: Performed by: STUDENT IN AN ORGANIZED HEALTH CARE EDUCATION/TRAINING PROGRAM

## 2017-10-25 PROCEDURE — 87081 CULTURE SCREEN ONLY: CPT | Performed by: OBSTETRICS & GYNECOLOGY

## 2017-10-25 PROCEDURE — 87880 STREP A ASSAY W/OPTIC: CPT | Performed by: OBSTETRICS & GYNECOLOGY

## 2017-10-25 RX ORDER — AMOXICILLIN 250 MG
1-2 CAPSULE ORAL 2 TIMES DAILY PRN
Qty: 60 TABLET | Refills: 0 | Status: SHIPPED | OUTPATIENT
Start: 2017-10-25 | End: 2018-01-08

## 2017-10-25 RX ORDER — ACETAMINOPHEN 325 MG/1
650 TABLET ORAL EVERY 4 HOURS PRN
Qty: 60 TABLET | Refills: 0 | Status: SHIPPED | OUTPATIENT
Start: 2017-10-25 | End: 2018-01-08

## 2017-10-25 RX ORDER — FERROUS GLUCONATE 324(38)MG
324 TABLET ORAL
Qty: 60 TABLET | Refills: 0 | Status: ON HOLD | OUTPATIENT
Start: 2017-10-25 | End: 2019-02-27

## 2017-10-25 RX ORDER — IBUPROFEN 600 MG/1
600 TABLET, FILM COATED ORAL EVERY 6 HOURS PRN
Qty: 60 TABLET | Refills: 0 | Status: SHIPPED | OUTPATIENT
Start: 2017-10-25 | End: 2017-12-07

## 2017-10-25 RX ADMIN — IBUPROFEN 800 MG: 400 TABLET ORAL at 07:00

## 2017-10-25 RX ADMIN — METHADONE HYDROCHLORIDE 90 MG: 5 SOLUTION ORAL at 08:39

## 2017-10-25 RX ADMIN — SENNOSIDES AND DOCUSATE SODIUM 2 TABLET: 8.6; 5 TABLET ORAL at 08:39

## 2017-10-25 NOTE — PLAN OF CARE
Problem: Patient Care Overview  Goal: Plan of Care/Patient Progress Review  Outcome: Adequate for Discharge Date Met:  10/25/17  Mom discharged, but will stay as a boarder status in room with baby. Court date set for tomorrow, so baby on 72 hr. Hold. All VS and full assessment WDL. All education complete. Strep throat Cx sent and pending. Mom very caring and loving with baby and performing all cares for baby, unless the 1:1 sitter offers help. Lots of teaching done for safety of baby. Mom following directions. Denies pain or any other complaints. Encouraged mom to breastfeed and/or pump. All d/c instructions reviewed and copy given to patient. D/C meds given prior to discharge.

## 2017-10-25 NOTE — PROGRESS NOTES
[facility]   Post-partum Note    Name:  Giselle Mackenzie  MRN: [id number]    S: Patient is feeling okay this morning. Didn't get any sleep besides one hour. Is also complaining of a sore throat, feels like she may have strep. Pain well controlled.  Denies dizziness, chest pain, SOB, nausea or vomiting. Tolerating regular diet without nausea or vomiting.  Ambulating without dizziness.  Spontaneously voiding. Reports flatus.  Lochia minimal and decreasing.  Breast feeding.  Plans Paragard IUD for postpartum contraception.     O:   Patient Vitals for the past 24 hrs:   BP Temp Temp src Pulse Heart Rate Resp SpO2   10/25/17 0726 138/78 98  F (36.7  C) Oral - 84 16 -   10/25/17 0032 110/60 98.6  F (37  C) Oral - 92 20 -   10/24/17 1830 126/80 98.3  F (36.8  C) Oral - 76 18 99 %   10/24/17 1703 114/65 98.4  F (36.9  C) Oral 82 - 18 98 %     Gen:  Resting comfortably, NAD  CV:  RRR  Pulm:  CTAB, no wheezes  Abd:  Soft, appropriately ttp, non-distended. Fundus firm below umbilicus, non-tender.  Ext:  non-tender, 1+ LE edema b/l         Hgb:   Hemoglobin   Date Value Ref Range Status   10/24/2017 10.4 (L) 11.7 - 15.7 g/dL Final       Assessment/Plan:  Giselle Mackenzie is a 32 year old  on PPD #2 s/p .     Postpartum cares:   - continue with routine postpartum management  Pain:               Well-controlled with ibuprofen and tylenol  Hgb:                11.6>EBL 17> 10.4, patient is asymptomatic, will discharge with PO iron  Rh:                  positive  Rubella:  immune  Feed:              Breast  BC:                 paragard  Dispo:             DC today    Sore throat:  - Strep culture ordered  - Lozenges prn     HepC:  - GI outpatient appointment to discuss treatment  - viral load pending     No GCT during PNC:  - FBG 83     Bipolar disorder w/ history of suicide attempt:  - s/p SW consult  - plan 1 week mood check     Hx substance abuse:  - Continue methodone 90mg daily  - s/p SW  consult    Aparna Fernandes MD  OB/GYN Resident PGY4  Pager [phone]  10/25/17

## 2017-10-25 NOTE — PLAN OF CARE
Problem: Patient Care Overview  Goal: Plan of Care/Patient Progress Review  Outcome: Improving  VSS and postpartum assessments WDL.  Up ad radha with steady gait and going outside to smoke.  Independent with cares.  Infant in on a 72 hour hold, staff sitter in room.  Bonding well and appropriately with infant.  Breastfeeding/formula feeding (via bottle and SNS at breast with nipple shield) and pumping every 2-3 hours for SGA infant.  Infant tolerating up to 20cc of formula per feeding.  Pain managed with ibuprofen per MAR and tucks and hot packs.  No family/support present, staff providing support.  Reviewed birth certificate and EDS and placed at bedside.  Will continue with postpartum cares and education per plan of care.

## 2017-10-25 NOTE — PROGRESS NOTES
Copied from baby's chart:    Per CPS worker SHAMAR Perez, [facility] Juvenile Court has been scheduled for Thursday 10/26 at 1:30pm. Mom has been notified of this.

## 2017-10-25 NOTE — PROGRESS NOTES
Received RN and MD referral to follow-up with patient today regarding concerns from nursing staff about patient's care-giving for  and about her discharge plan for today.      Met with Giselle.  She was very sleepy when I initiated the visit but she woke and readily interacted.  Giselle is aware that she is medically ready for discharge and has a discharge order.  Discussed boarding status and, specifically what this means for Giselle.      Reviewed the following:  *  Boarder status is available now but there is no guarantee of ongoing availability.  Charge nurse will notify Giselle if the room is needed for a postpartum patient and she will be asked to vacate the room.  Giselle has a back-up plan to return to [facility], if she needs to vacate the room.  She will utilize a taxi for transportation to [facility].    *  A sitter will continue to be present in the room while baby is on the hold and Giselle is boarding.    *  While boarding, Giselle is responsible for her own needs including personal cares and food.   She states she has money and other personal items to continue to stay.    *  While boarding,  It is an expectation that Giselle do all cares for baby including diaper changes and feedings.   *  Giselle will ask for help from nursing staff if she needs assistance with baby cares and/or needs a break.      Giselle expresses motivation to continue to be close to baby.  She states understanding of the above.      Social work will continue to follow

## 2017-10-26 LAB
HCV RNA SERPL NAA+PROBE-ACNC: 32 [IU]/ML
HCV RNA SERPL NAA+PROBE-LOG IU: 1.5 LOG IU/ML

## 2017-10-27 LAB
BACTERIA SPEC CULT: NORMAL
Lab: NORMAL
SPECIMEN SOURCE: NORMAL

## 2017-11-03 ENCOUNTER — HOSPITAL ENCOUNTER (OUTPATIENT)
Facility: CLINIC | Age: 32
End: 2017-11-03
Payer: MEDICAID

## 2017-12-01 PROBLEM — Z34.80 OTHER NORMAL PREGNANCY, NOT FIRST: Status: RESOLVED | Noted: 2017-05-25 | Resolved: 2017-12-01

## 2017-12-07 ENCOUNTER — RESULT FOLLOW UP (OUTPATIENT)
Dept: OBGYN | Facility: CLINIC | Age: 32
End: 2017-12-07

## 2017-12-07 ENCOUNTER — PRENATAL OFFICE VISIT (OUTPATIENT)
Dept: OBGYN | Facility: CLINIC | Age: 32
End: 2017-12-07
Payer: MEDICAID

## 2017-12-07 VITALS
SYSTOLIC BLOOD PRESSURE: 119 MMHG | DIASTOLIC BLOOD PRESSURE: 71 MMHG | BODY MASS INDEX: 34.66 KG/M2 | HEART RATE: 61 BPM | TEMPERATURE: 98.2 F | OXYGEN SATURATION: 98 % | HEIGHT: 63 IN | WEIGHT: 195.6 LBS

## 2017-12-07 DIAGNOSIS — R87.810 CERVICAL HIGH RISK HPV (HUMAN PAPILLOMAVIRUS) TEST POSITIVE: ICD-10-CM

## 2017-12-07 DIAGNOSIS — B96.89 BACTERIAL VAGINOSIS: ICD-10-CM

## 2017-12-07 DIAGNOSIS — N89.8 VAGINAL DISCHARGE: ICD-10-CM

## 2017-12-07 DIAGNOSIS — N76.0 BACTERIAL VAGINOSIS: ICD-10-CM

## 2017-12-07 DIAGNOSIS — Z12.4 SCREENING FOR CERVICAL CANCER: ICD-10-CM

## 2017-12-07 LAB
SPECIMEN SOURCE: ABNORMAL
WET PREP SPEC: ABNORMAL

## 2017-12-07 PROCEDURE — 87491 CHLMYD TRACH DNA AMP PROBE: CPT | Performed by: OBSTETRICS & GYNECOLOGY

## 2017-12-07 PROCEDURE — 99207 ZZC POST PARTUM EXAM: CPT | Performed by: OBSTETRICS & GYNECOLOGY

## 2017-12-07 PROCEDURE — G0145 SCR C/V CYTO,THINLAYER,RESCR: HCPCS | Performed by: OBSTETRICS & GYNECOLOGY

## 2017-12-07 PROCEDURE — G0476 HPV COMBO ASSAY CA SCREEN: HCPCS | Performed by: OBSTETRICS & GYNECOLOGY

## 2017-12-07 PROCEDURE — 87591 N.GONORRHOEAE DNA AMP PROB: CPT | Performed by: OBSTETRICS & GYNECOLOGY

## 2017-12-07 PROCEDURE — 87624 HPV HI-RISK TYP POOLED RSLT: CPT | Performed by: OBSTETRICS & GYNECOLOGY

## 2017-12-07 PROCEDURE — 87210 SMEAR WET MOUNT SALINE/INK: CPT | Performed by: OBSTETRICS & GYNECOLOGY

## 2017-12-07 RX ORDER — METRONIDAZOLE 500 MG/1
500 TABLET ORAL 2 TIMES DAILY
Qty: 14 TABLET | Refills: 0 | Status: SHIPPED | OUTPATIENT
Start: 2017-12-07 | End: 2018-01-08

## 2017-12-07 ASSESSMENT — PATIENT HEALTH QUESTIONNAIRE - PHQ9: SUM OF ALL RESPONSES TO PHQ QUESTIONS 1-9: 11

## 2017-12-07 NOTE — PROGRESS NOTES
OB GYN CLINIC VISIT  2017    CC: postpartum visit    SUBJECTIVE:  Giselle Mackenzie is a 32 year old female  here for a postpartum visit.  She had a  on 10/23/17 delivering a healthy baby boy weighing 5 lbs 3 oz at 37 weeks.      Diagnosed with trichomonas last week in urgent care, took metronidazole 2g PO x1.  was not treated. Recommend he get treated.     Living at a treatment facility. On methadone for treatment of opioid addiction. Denies relapse or cravings. Reports worsening depression, managed by psychiatrist at treatment facility. Currently on fluoxetine, nortriptyline and seroquel. Baby is in foster care. She gets supervised visits 3 times per week.     Today's Depression Rating was   PHQ-9 SCORE 2017 10/11/2017 2017   Total Score 4 4 11   Denies thoughts of harming self or others    Obstetric History       T3      L3     SAB3   TAB3   Ectopic0   Multiple0   Live Births3       # Outcome Date GA Lbr Kleber/2nd Weight Sex Delivery Anes PTL Lv   10 Term 10/23/17 37w1d 05:44 / 00:06 5 lb 3.6 oz (2.37 kg) M Vag-Spont EPI N JOSSY      Name: ARIELLE,MUNIR BAIN      Apgar1:  7                Apgar5: 9   9 Term 12 40w0d  7 lb 11 oz (3.487 kg) F   Y JOSSY      Name: Sangeetha   8 Term 05 37w0d  6 lb 11 oz (3.033 kg) M   Y JOSSY      Name: Lucio   7 TAB      TAB      6 TAB      TAB      5 TAB      TAB      4 AB            3 SAB            2 SAB            1 SAB                 delivery complications:  No  breast feeding:  No. Pumping occasionally.  bladder problems:  No  bowel problems/hemorrhoids:  Yes, constipation  episiotomy/laceration/incision healed? Yes  vaginal flow:  None  [place]:  Yes  contraception:  Condoms but not 100%. Considering paragard IUD. Feels like exogenous hormones negatively impact her mood.  emotional adjustment:  doing well, tired, having some difficulties adjusting and sad  back to work:  No    Current Outpatient Prescriptions   Medication  "    FLUoxetine HCl (PROZAC PO)     NORTRIPTYLINE HCL PO     acetaminophen (TYLENOL) 325 MG tablet     senna-docusate (SENOKOT-S;PERICOLACE) 8.6-50 MG per tablet     ferrous gluconate (FERGON) 324 (38 FE) MG tablet     valACYclovir (VALTREX) 500 MG tablet     METHADONE HCL PO     bisacodyl (DULCOLAX) 10 MG Suppository     Prenatal Vit-Fe Fumarate-FA (PRENATAL MULTIVITAMIN PLUS IRON) 27-0.8 MG TABS per tablet     triamcinolone (KENALOG) 0.1 % cream     QUEtiapine Fumarate (SEROQUEL PO)     No current facility-administered medications for this visit.        OBJECTIVE:  Blood pressure 119/71, pulse 61, temperature 98.2  F (36.8  C), temperature source Oral, height 5' 3\" (1.6 m), weight 195 lb 9.6 oz (88.7 kg), SpO2 98 %, not currently breastfeeding.   General - pleasant female in no acute distress.  Breast - no nodularity, asymmetry or nipple discharge bilaterally.  Abdomen - soft, nontender, nondistended, no hepatosplenomegaly.  Pelvic - EG: normal adult female, BUS: within normal limits, Vagina: well rugated, no discharge, Cervix: normal, no lesions or CMT, Uterus: firm, normal sized and nontender, Adnexae: no masses or tenderness.  Rectovaginal - deferred.    ASSESSMENT:  32 year old  with history of polysubstance abuse including heroin now in remission and living in treatment facility, bipolar depression, PTSD, hep C who is here for a postpartum exam  Bacterial vaginosis   Considering IUD for contraception    PLAN:  Discussed kegel exercises   May resume normal activities without restrictions  Pap smear was  done today    The patient will use condoms for birth control. Discussed IUDs. Patient had paragard in the past. Considering another. Recommend no intercourse or 100% condom use for 2 weeks prior to insertion. Recommend taking ibuprofen prior to IUD insertion appt. Full counseling was provided, and all questions answered. Compliance is strongly emphasized.   Return to clinic in one year for an annual, " sooner if desires IUD insertion or PRN.    Angeles Thomas MD

## 2017-12-07 NOTE — MR AVS SNAPSHOT
After Visit Summary   12/7/2017    Giselle Mackenzie    MRN: [id number]           Patient Information     Date Of Birth          1985        Visit Information        Provider Department      12/7/2017 10:00 AM Angeles Thomas MD [facility]        Today's Diagnoses     Routine postpartum follow-up    -  1    Screening for cervical cancer        Vaginal discharge        Bacterial vaginosis           Follow-ups after your visit        Your next 10 appointments already scheduled     Dec 19, 2017  8:30 AM CST   Lab with  LAB   [facility] ([facility])    [address] [place] [address]  [address] [facility] [address] [phone]            Dec 19, 2017  9:20 AM CST   (Arrive by 9:05 AM)   New General Liver with Von Montesinos MD   [facility] ([facility])    [address] [place] [address]  [address] [facility] [address] [phone]              Who to contact     If you have questions or need follow up information about today's clinic visit or your schedule please contact [facility] directly at [phone].  Normal or non-critical lab and imaging results will be communicated to you by MyChart, letter or phone within 4 business days after the clinic has received the results. If you do not hear from us within 7 days, please contact the clinic through [employer] or phone. If you have a critical or abnormal lab result, we will notify you by phone as soon as possible.  Submit refill requests through [employer] or call your pharmacy and they will forward the refill request to us. Please allow 3 business days for your refill to be completed.          Additional Information About Your Visit        MyChart Information     [employer] gives you secure access to your electronic health record. If you see a primary care provider, you can also send messages to your care team and make appointments. If you have  "questions, please call your primary care clinic.  If you do not have a primary care provider, please call [phone] and they will assist you.        Care EveryWhere ID     This is your Care EveryWhere ID. This could be used by other organizations to access your [place] medical records  [id number]        Your Vitals Were     Pulse Temperature Height Pulse Oximetry Breastfeeding? BMI (Body Mass Index)    61 98.2  F (36.8  C) (Oral) 5' 3\" (1.6 m) 98% No 34.65 kg/m2       Blood Pressure from Last 3 Encounters:   12/07/17 119/71   10/25/17 138/78   10/17/17 126/80    Weight from Last 3 Encounters:   12/07/17 195 lb 9.6 oz (88.7 kg)   10/23/17 182 lb (82.6 kg)   10/17/17 185 lb (83.9 kg)              We Performed the Following     CHLAMYDIA TRACHOMATIS PCR     HPV High Risk Types DNA Cervical     NEISSERIA GONORRHOEA PCR     Pap imaged thin layer screen with HPV - recommended age 30 - 65 years (select HPV order below)     Wet prep          Today's Medication Changes          These changes are accurate as of: 12/7/17  2:30 PM.  If you have any questions, ask your nurse or doctor.               Start taking these medicines.        Dose/Directions    metroNIDAZOLE 500 MG tablet   Commonly known as:  FLAGYL   Used for:  Bacterial vaginosis   Started by:  Angeles Thomas MD        Dose:  500 mg   Take 1 tablet (500 mg) by mouth 2 times daily   Quantity:  14 tablet   Refills:  0         Stop taking these medicines if you haven't already. Please contact your care team if you have questions.     DESITIN Oint   Stopped by:  Angeles Thomas MD           hydrOXYzine 50 MG capsule   Commonly known as:  VISTARIL   Stopped by:  Angeles Thomas MD           ibuprofen 600 MG tablet   Commonly known as:  ADVIL/MOTRIN   Stopped by:  Angeles Thomas MD           ondansetron 4 MG ODT tab   Commonly known as:  ZOFRAN ODT   Stopped by:  Angeles Thomas MD           psyllium 0.52 G capsule "   Stopped by:  Angeles Thomas MD                Where to get your medicines      These medications were sent to [id number] - [place], [place] - [address]  [address], [place] [address]     Phone:  [phone]     metroNIDAZOLE 500 MG tablet                Primary Care Provider Office Phone # Fax #    Mary Crater PA-C [phone]       [facility] [address]  [facility] [address]        Equal Access to Services     SHYANN SANDHU : Hadii aad ku hadasho Soomaali, waaxda luqadaha, qaybta kaalmada adeegyada, waxay idiin hayaan adeeg kharamonet bernabe . So [facility] [phone].    ATENCIÓN: Si habla español, tiene a lewis disposición servicios gratuitos de asistencia lingüística. [facility] [phone].    We comply with applicable federal civil rights laws and [place] laws. We do not discriminate on the basis of race, color, national origin, age, disability, sex, sexual orientation, or gender identity.            Thank you!     Thank you for choosing [facility]  for your care. Our goal is always to provide you with excellent care. Hearing back from our patients is one way we can continue to improve our services. Please take a few minutes to complete the written survey that you may receive in the mail after your visit with us. Thank you!             Your Updated Medication List - Protect others around you: Learn how to safely use, store and throw away your medicines at www.disposemymeds.org.          This list is accurate as of: 17  2:30 PM.  Always use your most recent med list.                   Brand Name Dispense Instructions for use Diagnosis    acetaminophen 325 MG tablet    TYLENOL    60 tablet    Take 2 tablets (650 mg) by mouth every 4 hours as needed for mild pain or fever (greater than or equal to 38? C /100.4? F (oral) or 38.5? C/ 101.4? F (core).)     (normal spontaneous vaginal delivery)       bisacodyl 10 MG  Suppository    DULCOLAX    25 suppository    Place 1 suppository (10 mg) rectally daily as needed for constipation    Constipation during pregnancy, third trimester       ferrous gluconate 324 (38 FE) MG tablet    FERGON    60 tablet    Take 1 tablet (324 mg) by mouth daily (with breakfast)     (normal spontaneous vaginal delivery)       METHADONE HCL PO      Take 90 mg by mouth daily        metroNIDAZOLE 500 MG tablet    FLAGYL    14 tablet    Take 1 tablet (500 mg) by mouth 2 times daily    Bacterial vaginosis       NORTRIPTYLINE HCL PO      Take 1 mg/kg/day by mouth        prenatal multivitamin plus iron 27-0.8 MG Tabs per tablet     100 tablet    Take 1 tablet by mouth daily    Supervision of high-risk pregnancy of young multigravida       PROZAC PO      Take 30 mg by mouth        senna-docusate 8.6-50 MG per tablet    SENOKOT-S;PERICOLACE    60 tablet    Take 1-2 tablets by mouth 2 times daily as needed for constipation     (normal spontaneous vaginal delivery)       SEROQUEL PO      Take 50 mg by mouth as needed        triamcinolone 0.1 % cream    KENALOG    80 g    Apply sparingly to affected area three times daily as needed    Eczema, unspecified type       valACYclovir 500 MG tablet    VALTREX    90 tablet    Take 1 tablet (500 mg) by mouth 2 times daily Until delivery    HSV (herpes simplex virus) infection

## 2017-12-07 NOTE — LETTER
December 14, 2017      Giselle Rere Mackenzie  [address]  [place] [address]    Dear ,      This letter is in regards to the PAP smear and HPV (Human Papillomavirus) test you had done recently. Your PAP test result is normal, but your HPV (Human Papillomavirus) test was positive.     About 80 percent of women have been exposed to HPV virus throughout their lifetime. There is no medication for the treatment of HPV. Typically your own immune system gets rid of the virus before it does harm. HPV is spread by direct skin-to-skin contact, including sexual intercourse, oral sex, anal sex, or any other contact involving the genital area (example: hand to genital contact). It is not possible to become infected with HPV by touching an object, such as a toilet seat. Most people who are infected with HPV have no signs or symptoms.    Things that you can do to boost your immune system and help your body get rid of HPV: get plenty of rest, eat a well-balanced diet of healthy foods, and stop smoking.     Please return in 1 year to repeat your pap smear and HPV test.     If you have additional questions regarding this result, please call [phone].    Sincerely,      Angeles Thomas MD/[facility]

## 2017-12-07 NOTE — PROGRESS NOTES
"Chief Complaint   Patient presents with     Post Partum Exam       Initial /71 (BP Location: Left arm, Patient Position: Sitting, Cuff Size: Adult Large)  Pulse 61  Temp 98.2  F (36.8  C) (Oral)  Ht 5' 3\" (1.6 m)  Wt 195 lb 9.6 oz (88.7 kg)  SpO2 98%  Breastfeeding? No  BMI 34.65 kg/m2 Estimated body mass index is 34.65 kg/(m^2) as calculated from the following:    Height as of this encounter: 5' 3\" (1.6 m).    Weight as of this encounter: 195 lb 9.6 oz (88.7 kg).  BP completed using cuff size: regular        The following HM Due: NONE      The following patient reported/Care Every where data was sent to:  P ABSTRACT QUALITY INITIATIVES [77097]             "

## 2017-12-07 NOTE — LETTER
November 23, 2018      Giselle Mackenzie  [address]  [place] [address]    Dear ,      At [facility], your health and wellness is our primary concern. That is why we are following up on a positive high risk HPV test from 12/07/17. Your provider had recommended that you have a Pap smear and HPV test completed by 12/07/18. Our records do not show that this has been scheduled.    It is important to complete the follow up that your provider has suggested for you to ensure that there are no worsening changes which may, over time, develop into cancer.      Please contact our office at  [phone] to schedule an appointment for a Pap smear and HPV test at your earliest convenience. If you have questions or concerns, please call the clinic and we will be happy to assist you.    If you have completed the tests outside of [facility], please have the results forwarded to our office. We will update the chart for your primary Physician to review before your next annual physical.     Thank you for choosing [facility]!    Sincerely,      Angeles Thomas MD/[facility]

## 2017-12-11 LAB
COPATH REPORT: NORMAL
PAP: NORMAL

## 2017-12-13 PROBLEM — R87.810 CERVICAL HIGH RISK HPV (HUMAN PAPILLOMAVIRUS) TEST POSITIVE: Status: ACTIVE | Noted: 2017-12-07

## 2017-12-13 LAB
FINAL DIAGNOSIS: ABNORMAL
HPV HR 12 DNA CVX QL NAA+PROBE: POSITIVE
HPV16 DNA SPEC QL NAA+PROBE: NEGATIVE
HPV18 DNA SPEC QL NAA+PROBE: NEGATIVE
SPECIMEN DESCRIPTION: ABNORMAL

## 2017-12-13 NOTE — PROGRESS NOTES
12/07/17: NIL pap, + HR HPV (not 16 or 18) result. Plan cotest in 1 year. Pt was advised.  12/14/17 Result letter printed and sent at request of RN. ([facility])   11/23/18 Cotest reminder letter sent. ([facility])  12/14/18 [facility] clinic and schedule. ([facility])  12/28/18 Patient is lost to pap tracking follow-up. FYI routed to provider. ([facility])
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
## 2018-01-08 ENCOUNTER — OFFICE VISIT (OUTPATIENT)
Dept: FAMILY MEDICINE | Facility: CLINIC | Age: 33
End: 2018-01-08
Payer: MEDICAID

## 2018-01-08 VITALS
WEIGHT: 208.4 LBS | BODY MASS INDEX: 36.92 KG/M2 | SYSTOLIC BLOOD PRESSURE: 124 MMHG | OXYGEN SATURATION: 95 % | TEMPERATURE: 97.6 F | DIASTOLIC BLOOD PRESSURE: 78 MMHG | HEART RATE: 101 BPM

## 2018-01-08 DIAGNOSIS — E55.9 VITAMIN D DEFICIENCY: ICD-10-CM

## 2018-01-08 DIAGNOSIS — R53.83 OTHER FATIGUE: Primary | ICD-10-CM

## 2018-01-08 DIAGNOSIS — R63.5 ABNORMAL WEIGHT GAIN: ICD-10-CM

## 2018-01-08 DIAGNOSIS — K59.00 CONSTIPATION, UNSPECIFIED CONSTIPATION TYPE: ICD-10-CM

## 2018-01-08 LAB
DEPRECATED CALCIDIOL+CALCIFEROL SERPL-MC: 17 UG/L (ref 20–75)
ERYTHROCYTE [DISTWIDTH] IN BLOOD BY AUTOMATED COUNT: 15 % (ref 10–15)
FERRITIN SERPL-MCNC: 41 NG/ML (ref 12–150)
HCT VFR BLD AUTO: 41.5 % (ref 35–47)
HGB BLD-MCNC: 14.1 G/DL (ref 11.7–15.7)
MCH RBC QN AUTO: 29 PG (ref 26.5–33)
MCHC RBC AUTO-ENTMCNC: 34 G/DL (ref 31.5–36.5)
MCV RBC AUTO: 85 FL (ref 78–100)
PLATELET # BLD AUTO: 280 10E9/L (ref 150–450)
RBC # BLD AUTO: 4.87 10E12/L (ref 3.8–5.2)
TSH SERPL DL<=0.005 MIU/L-ACNC: 1.9 MU/L (ref 0.4–4)
WBC # BLD AUTO: 6.7 10E9/L (ref 4–11)

## 2018-01-08 PROCEDURE — 85027 COMPLETE CBC AUTOMATED: CPT | Performed by: PHYSICIAN ASSISTANT

## 2018-01-08 PROCEDURE — 99214 OFFICE O/P EST MOD 30 MIN: CPT | Performed by: PHYSICIAN ASSISTANT

## 2018-01-08 PROCEDURE — 84443 ASSAY THYROID STIM HORMONE: CPT | Performed by: PHYSICIAN ASSISTANT

## 2018-01-08 PROCEDURE — 82728 ASSAY OF FERRITIN: CPT | Performed by: PHYSICIAN ASSISTANT

## 2018-01-08 PROCEDURE — 82306 VITAMIN D 25 HYDROXY: CPT | Performed by: PHYSICIAN ASSISTANT

## 2018-01-08 PROCEDURE — 36415 COLL VENOUS BLD VENIPUNCTURE: CPT | Performed by: PHYSICIAN ASSISTANT

## 2018-01-08 RX ORDER — AMOXICILLIN 250 MG
1-2 CAPSULE ORAL 2 TIMES DAILY PRN
Qty: 60 TABLET | Refills: 0 | Status: ON HOLD | OUTPATIENT
Start: 2018-01-08 | End: 2019-02-27

## 2018-01-08 NOTE — PATIENT INSTRUCTIONS
Get labs done today  Return to clinic for any new or worsening symptoms or go to ER Urgent care in off hours

## 2018-01-08 NOTE — MR AVS SNAPSHOT
After Visit Summary   1/8/2018    Giselle Mackenzie    MRN: [id number]           Patient Information     Date Of Birth          1985        Visit Information        Provider Department      1/8/2018 10:20 AM Sandhya Tripp PA-C [facility]        Today's Diagnoses     Other fatigue    -  1    Constipation, unspecified constipation type        Abnormal weight gain        Vitamin D deficiency          Care Instructions    Get labs done today  Return to clinic for any new or worsening symptoms or go to ER Urgent care in off hours              Follow-ups after your visit        Your next 10 appointments already scheduled     Mar 05, 2018  8:00 AM CST   Lab with  LAB   [facility] ([facility])    [address]  1st Floor  [facility] [address] [phone]            Mar 05, 2018  9:00 AM CST   (Arrive by 8:45 AM)   New General Liver with Von Montesinos MD   [facility] Hepatology ([facility])    [address]  [address]  [facility] [address] [phone]              Who to contact     If you have questions or need follow up information about today's clinic visit or your schedule please contact [facility] directly at [phone].  Normal or non-critical lab and imaging results will be communicated to you by MyChart, letter or phone within 4 business days after the clinic has received the results. If you do not hear from us within 7 days, please contact the clinic through MyChart or phone. If you have a critical or abnormal lab result, we will notify you by phone as soon as possible.  Submit refill requests through [employer] or call your pharmacy and they will forward the refill request to us. Please allow 3 business days for your refill to be completed.          Additional Information About Your Visit        PharmRight Cor[employer]art Information     [employer] gives you secure access to your  electronic health record. If you see a primary care provider, you can also send messages to your care team and make appointments. If you have questions, please call your primary care clinic.  If you do not have a primary care provider, please call [phone] and they will assist you.        Care EveryWhere ID     This is your Care EveryWhere ID. This could be used by other organizations to access your [place] medical records  [id number]        Your Vitals Were     Pulse Temperature Pulse Oximetry BMI (Body Mass Index)          101 97.6  F (36.4  C) (Oral) 95% 36.92 kg/m2         Blood Pressure from Last 3 Encounters:   01/08/18 124/78   12/07/17 119/71   10/25/17 138/78    Weight from Last 3 Encounters:   01/08/18 208 lb 6.4 oz (94.5 kg)   12/07/17 195 lb 9.6 oz (88.7 kg)   10/23/17 182 lb (82.6 kg)              We Performed the Following     CBC with platelets     Ferritin     TSH with free T4 reflex     Vitamin D Deficiency          Today's Medication Changes          These changes are accurate as of: 1/8/18 10:43 AM.  If you have any questions, ask your nurse or doctor.               Stop taking these medicines if you haven't already. Please contact your care team if you have questions.     acetaminophen 325 MG tablet   Commonly known as:  TYLENOL   Stopped by:  Sandhya Tripp PA-C           bisacodyl 10 MG Suppository   Commonly known as:  DULCOLAX   Stopped by:  Sandhya Tripp PA-C           metroNIDAZOLE 500 MG tablet   Commonly known as:  FLAGYL   Stopped by:  Sandhya Tripp PA-C           valACYclovir 500 MG tablet   Commonly known as:  VALTREX   Stopped by:  Sandhya Tripp PA-C                Where to get your medicines      These medications were sent to [facility] - [place], [place] - [address]  [address], [facility] [address]     Phone:  [phone]     senna-docusate 8.6-50 MG per tablet                 Primary Care Provider Office Phone # Fax #    Mary Carter PA-C [phone]       [address] [address]        Equal Access to Services     SHYANN SANDHU : Hadii danyelle ku hadjungo Sodeisyali, waaxda luqadaha, qaybta kaalmada adeegyada, hafsa hernandez laDorotheanick allen. So [facility] [phone].    ATENCIÓN: Si habla español, tiene a lewis disposición servicios gratuitos de asistencia lingüística. Llame al [phone].    We comply with applicable federal civil rights laws and [place] laws. We do not discriminate on the basis of race, color, national origin, age, disability, sex, sexual orientation, or gender identity.            Thank you!     Thank you for choosing [facility]  for your care. Our goal is always to provide you with excellent care. Hearing back from our patients is one way we can continue to improve our services. Please take a few minutes to complete the written survey that you may receive in the mail after your visit with us. Thank you!             Your Updated Medication List - Protect others around you: Learn how to safely use, store and throw away your medicines at www.disposemymeds.org.          This list is accurate as of: 18 10:43 AM.  Always use your most recent med list.                   Brand Name Dispense Instructions for use Diagnosis    ferrous gluconate 324 (38 FE) MG tablet    FERGON    60 tablet    Take 1 tablet (324 mg) by mouth daily (with breakfast)     (normal spontaneous vaginal delivery)       METHADONE HCL PO      Take 90 mg by mouth daily        NORTRIPTYLINE HCL PO      Take 1 mg/kg/day by mouth        prenatal multivitamin plus iron 27-0.8 MG Tabs per tablet     100 tablet    Take 1 tablet by mouth daily    Supervision of high-risk pregnancy of young multigravida       PROZAC PO      Take 30 mg by mouth        senna-docusate 8.6-50 MG per tablet    SENOKOT-S;PERICOLACE    60 tablet    Take 1-2 tablets by  mouth 2 times daily as needed for constipation    Constipation, unspecified constipation type       SEROQUEL PO      Take 50 mg by mouth as needed        triamcinolone 0.1 % cream    KENALOG    80 g    Apply sparingly to affected area three times daily as needed    Eczema, unspecified type

## 2018-03-05 ENCOUNTER — OFFICE VISIT (OUTPATIENT)
Dept: GASTROENTEROLOGY | Facility: CLINIC | Age: 33
End: 2018-03-05
Attending: INTERNAL MEDICINE
Payer: MEDICAID

## 2018-03-05 VITALS
HEIGHT: 63 IN | OXYGEN SATURATION: 92 % | BODY MASS INDEX: 35.44 KG/M2 | WEIGHT: 200 LBS | DIASTOLIC BLOOD PRESSURE: 77 MMHG | SYSTOLIC BLOOD PRESSURE: 133 MMHG | TEMPERATURE: 98 F | HEART RATE: 104 BPM

## 2018-03-05 DIAGNOSIS — B18.2 CHRONIC HEPATITIS C WITHOUT HEPATIC COMA (H): ICD-10-CM

## 2018-03-05 DIAGNOSIS — B18.2 CHRONIC HEPATITIS C WITHOUT HEPATIC COMA (H): Primary | ICD-10-CM

## 2018-03-05 PROBLEM — O09.90 HIGH-RISK PREGNANCY, UNSPECIFIED TRIMESTER: Status: RESOLVED | Noted: 2017-10-03 | Resolved: 2018-03-05

## 2018-03-05 PROBLEM — O09.629 SUPERVISION OF HIGH-RISK PREGNANCY OF YOUNG MULTIGRAVIDA: Status: RESOLVED | Noted: 2017-05-26 | Resolved: 2018-03-05

## 2018-03-05 LAB
ALBUMIN SERPL-MCNC: 3.7 G/DL (ref 3.4–5)
ALP SERPL-CCNC: 122 U/L (ref 40–150)
ALT SERPL W P-5'-P-CCNC: 56 U/L (ref 0–50)
ANION GAP SERPL CALCULATED.3IONS-SCNC: 8 MMOL/L (ref 3–14)
AST SERPL W P-5'-P-CCNC: 32 U/L (ref 0–45)
BILIRUB DIRECT SERPL-MCNC: <0.1 MG/DL (ref 0–0.2)
BILIRUB SERPL-MCNC: 0.2 MG/DL (ref 0.2–1.3)
BUN SERPL-MCNC: 14 MG/DL (ref 7–30)
CALCIUM SERPL-MCNC: 8.5 MG/DL (ref 8.5–10.1)
CHLORIDE SERPL-SCNC: 104 MMOL/L (ref 94–109)
CO2 SERPL-SCNC: 24 MMOL/L (ref 20–32)
CREAT SERPL-MCNC: 0.7 MG/DL (ref 0.52–1.04)
ERYTHROCYTE [DISTWIDTH] IN BLOOD BY AUTOMATED COUNT: 13.5 % (ref 10–15)
GFR SERPL CREATININE-BSD FRML MDRD: >90 ML/MIN/1.7M2
GLUCOSE SERPL-MCNC: 108 MG/DL (ref 70–99)
HCT VFR BLD AUTO: 43.5 % (ref 35–47)
HGB BLD-MCNC: 14.7 G/DL (ref 11.7–15.7)
INR PPP: 0.86 (ref 0.86–1.14)
MCH RBC QN AUTO: 29.6 PG (ref 26.5–33)
MCHC RBC AUTO-ENTMCNC: 33.8 G/DL (ref 31.5–36.5)
MCV RBC AUTO: 88 FL (ref 78–100)
PLATELET # BLD AUTO: 352 10E9/L (ref 150–450)
POTASSIUM SERPL-SCNC: 4 MMOL/L (ref 3.4–5.3)
PROT SERPL-MCNC: 7.5 G/DL (ref 6.8–8.8)
RBC # BLD AUTO: 4.97 10E12/L (ref 3.8–5.2)
SODIUM SERPL-SCNC: 136 MMOL/L (ref 133–144)
WBC # BLD AUTO: 8.7 10E9/L (ref 4–11)

## 2018-03-05 PROCEDURE — G0499 HEPB SCREEN HIGH RISK INDIV: HCPCS | Performed by: INTERNAL MEDICINE

## 2018-03-05 PROCEDURE — 86708 HEPATITIS A ANTIBODY: CPT | Performed by: INTERNAL MEDICINE

## 2018-03-05 PROCEDURE — 86706 HEP B SURFACE ANTIBODY: CPT | Performed by: INTERNAL MEDICINE

## 2018-03-05 PROCEDURE — 85027 COMPLETE CBC AUTOMATED: CPT | Performed by: INTERNAL MEDICINE

## 2018-03-05 PROCEDURE — 80048 BASIC METABOLIC PNL TOTAL CA: CPT | Performed by: INTERNAL MEDICINE

## 2018-03-05 PROCEDURE — 86704 HEP B CORE ANTIBODY TOTAL: CPT | Performed by: INTERNAL MEDICINE

## 2018-03-05 PROCEDURE — 87902 NFCT AGT GNTYP ALYS HEP C: CPT | Performed by: INTERNAL MEDICINE

## 2018-03-05 PROCEDURE — G0463 HOSPITAL OUTPT CLINIC VISIT: HCPCS | Mod: ZF

## 2018-03-05 PROCEDURE — 80076 HEPATIC FUNCTION PANEL: CPT | Performed by: INTERNAL MEDICINE

## 2018-03-05 PROCEDURE — 36415 COLL VENOUS BLD VENIPUNCTURE: CPT | Performed by: INTERNAL MEDICINE

## 2018-03-05 PROCEDURE — 87522 HEPATITIS C REVRS TRNSCRPJ: CPT | Performed by: INTERNAL MEDICINE

## 2018-03-05 PROCEDURE — 91200 LIVER ELASTOGRAPHY: CPT | Mod: ZF

## 2018-03-05 PROCEDURE — 85610 PROTHROMBIN TIME: CPT | Performed by: INTERNAL MEDICINE

## 2018-03-05 ASSESSMENT — PAIN SCALES - GENERAL: PAINLEVEL: MODERATE PAIN (5)

## 2018-03-05 NOTE — MR AVS SNAPSHOT
"              After Visit Summary   3/5/2018    Giselle Mackenzie    MRN: [id number]           Patient Information     Date Of Birth          1985        Visit Information        Provider Department      3/5/2018 9:00 AM Von Daniels MD [facility] Hepatology        Today's Diagnoses     Chronic hepatitis C without hepatic coma (H)    -  1       Follow-ups after your visit        Follow-up notes from your care team     Return if symptoms worsen or fail to improve.      Who to contact     If you have questions or need follow up information about today's clinic visit or your schedule please contact [facility] directly at [phone].  Normal or non-critical lab and imaging results will be communicated to you by MyChart, letter or phone within 4 business days after the clinic has received the results. If you do not hear from us within 7 days, please contact the clinic through [employer] or phone. If you have a critical or abnormal lab result, we will notify you by phone as soon as possible.  Submit refill requests through [employer] or call your pharmacy and they will forward the refill request to us. Please allow 3 business days for your refill to be completed.          Additional Information About Your Visit        MyChart Information     [employer] gives you secure access to your electronic health record. If you see a primary care provider, you can also send messages to your care team and make appointments. If you have questions, please call your primary care clinic.  If you do not have a primary care provider, please call [phone] and they will assist you.        Care EveryWhere ID     This is your Care EveryWhere ID. This could be used by other organizations to access your [facility] medical records  [id number]        Your Vitals Were     Pulse Temperature Height Pulse Oximetry BMI (Body Mass Index)       104 98  F (36.7  C) (Oral) 1.6 m (5' 3\") 92% 35.43 kg/m2        Blood Pressure from Last 3 " Encounters:   18 133/77   18 124/78   17 119/71    Weight from Last 3 Encounters:   18 90.7 kg (200 lb)   18 94.5 kg (208 lb 6.4 oz)   17 88.7 kg (195 lb 9.6 oz)               Primary Care Provider Office Phone # Fax #    Mary Carter PA-C [phone]       [address] [address]        Equal Access to Services     [facility]: Hadii aad ku hadasho Soomaali, waaxda luqadaha, qaybta kaalmada adeegyada, waxesme bernabe . So [facility] [phone].    ATENCIÓN: Si habla español, tiene a lewis disposición servicios gratuitos de asistencia lingüística. [facility] [phone].    We comply with applicable federal civil rights laws and [place] laws. We do not discriminate on the basis of race, color, national origin, age, disability, sex, sexual orientation, or gender identity.            Thank you!     Thank you for choosing [facility]PAT[facility]  for your care. Our goal is always to provide you with excellent care. Hearing back from our patients is one way we can continue to improve our services. Please take a few minutes to complete the written survey that you may receive in the mail after your visit with us. Thank you!             Your Updated Medication List - Protect others around you: Learn how to safely use, store and throw away your medicines at www.disposemymeds.org.          This list is accurate as of 3/5/18 11:59 PM.  Always use your most recent med list.                   Brand Name Dispense Instructions for use Diagnosis    cholecalciferol 5000 UNITS Caps capsule    vitamin D3    90 capsule    Take 1 capsule (5,000 Units) by mouth daily    Vitamin D deficiency       ferrous gluconate 324 (38 FE) MG tablet    FERGON    60 tablet    Take 1 tablet (324 mg) by mouth daily (with breakfast)     (normal spontaneous vaginal delivery)       METHADONE HCL PO      Take 85 mg by mouth daily        NORTRIPTYLINE HCL PO       Take 1 mg/kg/day by mouth        PROZAC PO      Take 30 mg by mouth        senna-docusate 8.6-50 MG per tablet    SENOKOT-S;PERICOLACE    60 tablet    Take 1-2 tablets by mouth 2 times daily as needed for constipation    Constipation, unspecified constipation type       triamcinolone 0.1 % cream    KENALOG    80 g    Apply sparingly to affected area three times daily as needed    Eczema, unspecified type

## 2018-03-05 NOTE — PROGRESS NOTES
"[facility]    Hepatology New Patient Visit    Referring provider:  Tahira Daly      32 year old female    Chief complaint:  Chronic hepatitis C    HPI:  HCV  - dx 2016  - GT?  - hx IVDU 2008-17  - HCV RNA neg 10/4/16  - no prior liver bx  - no prior antiviral therapy    Patient comes to clinic this AM for evaluation and management of chronic hepatitis C.  She likely acquired this via IV DU between Oct 2016 and mid 2017.  Of note, HCV RNA was negative in Oct 2016.  She has never had a liver biopsy and has never been treated for hepatitis C.    Patient is well today.  She denies any signs or symptoms specific to liver disease.    Patient denies jaundice, abdominal distension, lower extremity edema, lethargy or confusion.    No history of melena, hematemesis or hematochezia.    Patient denies fevers, sweats or chills.    Patient has gained 90lbs since Aug 2017.    Patient had a baby boy in Oct 2017 who is now in foster care.  She had no problems during pregnancy.    Patient has been sober since Aug 13th 2017.  She completed a 4 month inpatient treatment program and is now one month into a 3-month period of outpatient rehab.  She used IV drugs from 2008 to Aug 2017.  She used cocaine from 2002 to 2008.    Patient is a current smoker since age 9.  She is currently smoking 10 cigs per day.    Patient has a history of alcohol abuse.  She last drank 7 months ago.  She drank heavily for \"a few\" years (1L vodka per day with a 6 pack of high alcohol beer) but significantly reduced her intake (1-2 beers twice per week) until Aug 2017.    Medical hx Surgical hx   Past Medical History:   Diagnosis Date     ADD (attention deficit disorder)      Asthma      Bipolar disorder, unspecified      Cellulitis 2013     Cervical high risk HPV (human papillomavirus) test positive 12/07/2017     GERD (gastroesophageal reflux disease)      H/O ETOH abuse      H/O intravenous drug use in remission      OCD " (obsessive compulsive disorder)      PTSD (post-traumatic stress disorder)       Past Surgical History:   Procedure Laterality Date     HAND SURGERY Right     MRSA abscess drainage     HC TOOTH EXTRACTION W/FORCEP            Medications  Prior to Admission medications    Medication Sig Start Date End Date Taking? Authorizing Provider   cholecalciferol (VITAMIN D3) 5000 UNITS CAPS capsule Take 1 capsule (5,000 Units) by mouth daily 1/9/18  Yes Sandhya Tripp PA-C   senna-docusate (SENOKOT-S;PERICOLACE) 8.6-50 MG per tablet Take 1-2 tablets by mouth 2 times daily as needed for constipation 1/8/18  Yes Sandhya Tripp PA-C   FLUoxetine HCl (PROZAC PO) Take 30 mg by mouth   Yes Reported, Patient   NORTRIPTYLINE HCL PO Take 1 mg/kg/day by mouth   Yes Reported, Patient   ferrous gluconate (FERGON) 324 (38 FE) MG tablet Take 1 tablet (324 mg) by mouth daily (with breakfast) 10/25/17  Yes Millicent Cruz MD   METHADONE HCL PO Take 85 mg by mouth daily    Yes Reported, Patient   triamcinolone (KENALOG) 0.1 % cream Apply sparingly to affected area three times daily as needed 9/22/17  Yes Racquel Rangel MD       Allergies  Allergies   Allergen Reactions     Sulfa Drugs Anaphylaxis     Latex Hives     Nickel Hives     Suboxone      fainting       Family hx Social hx   Family History   Problem Relation Age of Onset     DIABETES Mother      Cervical Cancer Maternal Grandmother      CEREBROVASCULAR DISEASE Maternal Grandfather      Alcoholism Paternal Uncle      Cirrhosis Paternal Uncle      Alcoholism Paternal Uncle      Cirrhosis Paternal Uncle      Migraines Paternal Grandfather      Coronary Artery Disease Early Onset Paternal Grandfather 55     Colon Cancer No family hx of       Social History   Substance Use Topics     Smoking status: Current Every Day Smoker     Packs/day: 0.25     Smokeless tobacco: Never Used     Alcohol use No     Lives in [facility] with genoveva.  3 children (all in  "foster care).  Not currently working.  Previously worked as hairdresser.     Review of systems  A 10-point review of systems was negative.    Examination  /77  Pulse 104  Temp 98  F (36.7  C) (Oral)  Ht 1.6 m (5' 3\")  Wt 90.7 kg (200 lb)  SpO2 92%  BMI 35.43 kg/m2  Body mass index is 35.43 kg/(m^2).    Gen- well, NAD, A+Ox3, normal color  Eye- EOMI  ENT- MMM, normal oropharynx  Lym- no palpable lymphadenopathy  CVS- S1, S2 normal, no added sounds, RRR  RS- CTA  Abd- obese, small (?abscess) scars, striae, soft, non-tender, no ascites or organomegaly on palpation or percussion, BS+  Extr- pulses good, no LEYDI  MS- hands normal- no clubbing  Neuro- A+Ox3, no asterixis  Skin- scar R hand, no rash or jaundice  Psych- normal mood    Laboratory  Lab Results   Component Value Date    WBC 6.7 01/08/2018    HGB 14.1 01/08/2018    MCV 85 01/08/2018     01/08/2018     HCV RNA 10/23/17= 32    HCV RNA 9/22/17= 79    HBV SAg 5/25/17 neg  HBV SAb 5/25/17 pos    HCV RNA 10/4/16 negative    Radiology  Nil recent    Assessment  32 year old female who presents for further evaluation and management of unknown genotype, treatment-naive chronic hepatitis C of ~18 months duration.  No clinical or biochemical evidence of cirrhosis.  Will complete serological testing and obtain Fibrosis Scan to evaluate for hepatic fibrosis.  Patient will not require ongoing hepatology follow-up if she achieves SVR12 after antiviral therapy and if she has no evidence of advanced hepatic fibrosis on Fibrosis Scan.    Plan  1.  Check CMP, INR, CBC  2.  Check HCV RNA, HCV GT, HAV Ab, HBV SAg, HBV SAb, HBV CAb  3.  Fibrosis Scan  4.  Follow-up TBD    Von Langston MD  Hepatology  [facility]     Addendum 3/9/2018- Fibrosis Scan= F0; HCV RNA= 319; hepatitis B serologies consistent with prior vaccination; HCV GT indeterminate which may be due to low viral load; will re-draw HCV GT- if this remains indeterminate, will treat with 8 " weeks of GLEC-PIB.

## 2018-03-05 NOTE — NURSING NOTE
Chief Complaint   Patient presents with     RECHECK     Hepatitis C Antibody Positive    Pt roomed, vitals, meds, and allergies reviewed with pt. Pt ready for provider.  Darell Rangel, CMA

## 2018-03-05 NOTE — LETTER
"3/5/2018      RE: Giselle Mackenzie  [address]  [place] [facility] [address]       [facility]    Hepatology New Patient Visit    Referring provider:  Tahira Daly      32 year old female    Chief complaint:  Chronic hepatitis C    HPI:  HCV  - dx 2016  - GT?  - hx IVDU 2008-17  - HCV RNA neg 10/4/16  - no prior liver bx  - no prior antiviral therapy    Patient comes to clinic this AM for evaluation and management of chronic hepatitis C.  She likely acquired this via IV DU between Oct 2016 and mid 2017.  Of note, HCV RNA was negative in Oct 2016.  She has never had a liver biopsy and has never been treated for hepatitis C.    Patient is well today.  She denies any signs or symptoms specific to liver disease.    Patient denies jaundice, abdominal distension, lower extremity edema, lethargy or confusion.    No history of melena, hematemesis or hematochezia.    Patient denies fevers, sweats or chills.    Patient has gained 90lbs since Aug 2017.    Patient had a baby boy in Oct 2017 who is now in foster care.  She had no problems during pregnancy.    Patient has been sober since Aug 13th 2017.  She completed a 4 month inpatient treatment program and is now one month into a 3-month period of outpatient rehab.  She used IV drugs from 2008 to Aug 2017.  She used cocaine from 2002 to 2008.    Patient is a current smoker since age 9.  She is currently smoking 10 cigs per day.    Patient has a history of alcohol abuse.  She last drank 7 months ago.  She drank heavily for \"a few\" years (1L vodka per day with a 6 pack of high alcohol beer) but significantly reduced her intake (1-2 beers twice per week) until Aug 2017.    Medical hx Surgical hx   Past Medical History:   Diagnosis Date     ADD (attention deficit disorder)      Asthma      Bipolar disorder, unspecified      Cellulitis 2013     Cervical high risk HPV (human papillomavirus) test positive 12/07/2017     GERD (gastroesophageal reflux " disease)      H/O ETOH abuse      H/O intravenous drug use in remission      OCD (obsessive compulsive disorder)      PTSD (post-traumatic stress disorder)       Past Surgical History:   Procedure Laterality Date     HAND SURGERY Right     MRSA abscess drainage     HC TOOTH EXTRACTION W/FORCEP            Medications  Prior to Admission medications    Medication Sig Start Date End Date Taking? Authorizing Provider   cholecalciferol (VITAMIN D3) 5000 UNITS CAPS capsule Take 1 capsule (5,000 Units) by mouth daily 1/9/18  Yes Sandhya Tripp PA-C   senna-docusate (SENOKOT-S;PERICOLACE) 8.6-50 MG per tablet Take 1-2 tablets by mouth 2 times daily as needed for constipation 1/8/18  Yes Sandhya Tripp PA-C   FLUoxetine HCl (PROZAC PO) Take 30 mg by mouth   Yes Reported, Patient   NORTRIPTYLINE HCL PO Take 1 mg/kg/day by mouth   Yes Reported, Patient   ferrous gluconate (FERGON) 324 (38 FE) MG tablet Take 1 tablet (324 mg) by mouth daily (with breakfast) 10/25/17  Yes Millicent Cruz MD   METHADONE HCL PO Take 85 mg by mouth daily    Yes Reported, Patient   triamcinolone (KENALOG) 0.1 % cream Apply sparingly to affected area three times daily as needed 9/22/17  Yes Racquel Rangel MD       Allergies  Allergies   Allergen Reactions     Sulfa Drugs Anaphylaxis     Latex Hives     Nickel Hives     Suboxone      fainting       Family hx Social hx   Family History   Problem Relation Age of Onset     DIABETES Mother      Cervical Cancer Maternal Grandmother      CEREBROVASCULAR DISEASE Maternal Grandfather      Alcoholism Paternal Uncle      Cirrhosis Paternal Uncle      Alcoholism Paternal Uncle      Cirrhosis Paternal Uncle      Migraines Paternal Grandfather      Coronary Artery Disease Early Onset Paternal Grandfather 55     Colon Cancer No family hx of       Social History   Substance Use Topics     Smoking status: Current Every Day Smoker     Packs/day: 0.25     Smokeless tobacco: Never  "Used     Alcohol use No     Lives in [facility] with loreto.  3 children (all in foster care).  Not currently working.  Previously worked as hairdresser.     Review of systems  A 10-point review of systems was negative.    Examination  /77  Pulse 104  Temp 98  F (36.7  C) (Oral)  Ht 1.6 m (5' 3\")  Wt 90.7 kg (200 lb)  SpO2 92%  BMI 35.43 kg/m2  Body mass index is 35.43 kg/(m^2).    Gen- well, NAD, A+Ox3, normal color  Eye- EOMI  ENT- MMM, normal oropharynx  Lym- no palpable lymphadenopathy  CVS- S1, S2 normal, no added sounds, RRR  RS- CTA  Abd- obese, small (?abscess) scars, striae, soft, non-tender, no ascites or organomegaly on palpation or percussion, BS+  Extr- pulses good, no LEYDI  MS- hands normal- no clubbing  Neuro- A+Ox3, no asterixis  Skin- scar R hand, no rash or jaundice  Psych- normal mood    Laboratory  Lab Results   Component Value Date    WBC 6.7 01/08/2018    HGB 14.1 01/08/2018    MCV 85 01/08/2018     01/08/2018     HCV RNA 10/23/17= 32    HCV RNA 9/22/17= 79    HBV SAg 5/25/17 neg  HBV SAb 5/25/17 pos    HCV RNA 10/4/16 negative    Radiology  Nil recent    Assessment  32 year old female who presents for further evaluation and management of unknown genotype, treatment-naive chronic hepatitis C of ~18 months duration.  No clinical or biochemical evidence of cirrhosis.  Will complete serological testing and obtain Fibrosis Scan to evaluate for hepatic fibrosis.  Patient will not require ongoing hepatology follow-up if she achieves SVR12 after antiviral therapy and if she has no evidence of advanced hepatic fibrosis on Fibrosis Scan.    Plan  1.  Check CMP, INR, CBC  2.  Check HCV RNA, HCV GT, HAV Ab, HBV SAg, HBV SAb, HBV CAb  3.  Fibrosis Scan  4.  Follow-up TBD    Von Langston MD  Hepatology  [facility]     Addendum 3/9/2018- Fibrosis Scan= F0; HCV RNA= 319; hepatitis B serologies consistent with prior vaccination; HCV GT indeterminate which may be due to low viral " load; will re-draw HCV GT- if this remains indeterminate, will treat with 8 weeks of GLEC-PIB.

## 2018-03-06 LAB
HAV IGG SER QL IA: NONREACTIVE
HBV CORE AB SERPL QL IA: NONREACTIVE
HBV SURFACE AB SERPL IA-ACNC: 979.1 M[IU]/ML
HBV SURFACE AG SERPL QL IA: NONREACTIVE
HCV RNA SERPL NAA+PROBE-ACNC: 319 [IU]/ML
HCV RNA SERPL NAA+PROBE-LOG IU: 2.5 LOG IU/ML

## 2018-03-09 LAB — HCV GENTYP SERPL NAA+PROBE: NORMAL

## 2018-03-19 ENCOUNTER — HOSPITAL ENCOUNTER (EMERGENCY)
Facility: CLINIC | Age: 33
Discharge: HOME | End: 2018-03-19
Attending: EMERGENCY MEDICINE | Admitting: EMERGENCY MEDICINE
Payer: MEDICAID

## 2018-03-19 VITALS
OXYGEN SATURATION: 98 % | SYSTOLIC BLOOD PRESSURE: 137 MMHG | DIASTOLIC BLOOD PRESSURE: 88 MMHG | RESPIRATION RATE: 16 BRPM | TEMPERATURE: 96.2 F

## 2018-03-19 DIAGNOSIS — F15.20 METHAMPHETAMINE DEPENDENCE (H): ICD-10-CM

## 2018-03-19 DIAGNOSIS — F11.29 OPIOID DEPENDENCE WITH OPIOID-INDUCED DISORDER (H): ICD-10-CM

## 2018-03-19 DIAGNOSIS — F14.10 COCAINE ABUSE, EPISODIC (H): ICD-10-CM

## 2018-03-19 DIAGNOSIS — R05.9 COUGH: ICD-10-CM

## 2018-03-19 PROCEDURE — 99285 EMERGENCY DEPT VISIT HI MDM: CPT | Mod: 25 | Performed by: EMERGENCY MEDICINE

## 2018-03-19 PROCEDURE — 99284 EMERGENCY DEPT VISIT MOD MDM: CPT | Mod: Z6 | Performed by: EMERGENCY MEDICINE

## 2018-03-19 PROCEDURE — 90791 PSYCH DIAGNOSTIC EVALUATION: CPT

## 2018-03-19 RX ORDER — DOXYCYCLINE 100 MG/1
100 CAPSULE ORAL 2 TIMES DAILY
Qty: 20 CAPSULE | Refills: 0 | Status: ON HOLD | OUTPATIENT
Start: 2018-03-19 | End: 2019-02-27

## 2018-03-19 ASSESSMENT — ENCOUNTER SYMPTOMS
HALLUCINATIONS: 0
CHILLS: 0
FEVER: 0
WOUND: 1
NERVOUS/ANXIOUS: 1

## 2018-03-19 NOTE — DISCHARGE INSTRUCTIONS
Take doxycycline 100 mg twice daily for your cough with dark sputum.      Rule 25 assessment was scheduled for this Wednesday.      Seek medical attention if worsening/concerns.

## 2018-03-19 NOTE — ED PROVIDER NOTES
History     Chief Complaint   Patient presents with     Wound Infection     Pts right arm is painful after shooting up in wrist a week ago, pt reports hx of cellulitis, also seeking detox from heroin     Suicidal     Pt reports thoughts of suicide since relapse one week ago     HPI  Giselle Mackenzie is a 32 year old female who presents seeking help for polysubstance abuse.  She says she is on methadone 85 mg daily.  She gets this from the [facility] clinic.  She was given her dose this am.  She says she was sober for 7 months and relapsed 10 days ago.  She has used 3 times in the past week.  She used heroin 1/2 mg IV roughly each time, meth 1/2 gm IV each time and also smoked crack.  Last use 2 days ago.  She has had a dark green productive cough over the past 6 days.  She says she had a red swollen area on her right hand from shooting up but this seems better.  She is feeling suicidal. Her significant other is worried about her safety and says he feels he needs to be with her all the time to keep her safe. The patient says she has hx of PTSD, anxiety, RA. She gave birth 5 months ago.      I have reviewed the Medications, Allergies, Past Medical and Surgical History, and Social History in the Epic system.    Review of Systems   Constitutional: Negative for chills and fever.   Skin: Positive for wound.   Psychiatric/Behavioral: Positive for suicidal ideas. Negative for hallucinations and self-injury. The patient is nervous/anxious.    All other systems reviewed and are negative.      Physical Exam   BP: 143/77  Heart Rate: 71  Temp: 96.2  F (35.7  C)  Resp: 16  SpO2: 97 %      Physical Exam   Constitutional: She is oriented to person, place, and time. She appears well-developed and well-nourished. No distress.   Fidgety.  Seems sleepy.    HENT:   Head: Normocephalic and atraumatic.   Right Ear: External ear normal.   Left Ear: External ear normal.   Nose: Nose normal.   Mouth/Throat: Oropharynx is clear  and moist.   Raspy sounding voice.    Eyes: EOM are normal. Pupils are equal, round, and reactive to light. Right eye exhibits no discharge. Left eye exhibits no discharge. No scleral icterus.   Neck: Normal range of motion. Neck supple.   Cardiovascular: Normal rate, regular rhythm and normal heart sounds.    Pulmonary/Chest: Effort normal and breath sounds normal.   Abdominal: Soft.   Musculoskeletal: Normal range of motion.   Neurological: She is alert and oriented to person, place, and time.   Skin: Skin is warm and dry. She is not diaphoretic.   8 mm circular scabbed lesion right wrist.  No drainage, redness, fluctuance or warmth.    Psychiatric: She has a normal mood and affect. Her speech is normal. She expresses suicidal ideation. She is inattentive.   Nursing note and vitals reviewed.      ED Course     ED Course     Procedures           Labs Ordered and Resulted from Time of ED Arrival Up to the Time of Departure from the ED - No data to display         Assessments & Plan (with Medical Decision Making)   The patient presents for several reasons.  First she has had a productive dark green cough for 6 days.  She smokes and also uses crack.  Given her use hx and color of sputum I will treat for possible early pneumonia.  She cannot take zpak given risk of prolonged QT due to taking methadone daily.  Therefore I will prescribe doxycycline.  She also had a lesion on her right hand and was worried about infection.  It is dry and does not appear infected.  It appears to be healing well.  She is also wanting to be somewhere to keep her sober until she can begin cd treatment.  Her 5 mo infant is in foster care due to using drugs while pregnant.  She is hoping to go to [facility] for treatment.  She was seen by the DEC .  The patient reports some suicidal thoughts but has no plan and says she would never do it.  We feel that she is safe for d/c home.  A rule 25 assessment was scheduled for this Wednesday.     I  have reviewed the nursing notes.    I have reviewed the findings, diagnosis, plan and need for follow up with the patient.    New Prescriptions    DOXYCYCLINE (VIBRAMYCIN) 100 MG CAPSULE    Take 1 capsule (100 mg) by mouth 2 times daily       Final diagnoses:   Opioid dependence with opioid-induced disorder (H)   Methamphetamine dependence (H)   Cocaine abuse, episodic   Cough       3/19/2018   [facility], [place], EMERGENCY DEPARTMENT     Amy Valdez MD  03/19/18 2810

## 2018-03-19 NOTE — ED AVS SNAPSHOT
[facility], [place], Emergency Department    [address]    [place] [address]    Phone:  [phone]    Fax:  [phone]                                       Giselle Mackenzie   MRN: [id number]    Department:  [facility], Emergency Department   Date of Visit:  3/19/2018           After Visit Summary Signature Page     I have received my discharge instructions, and my questions have been answered. I have discussed any challenges I see with this plan with the nurse or doctor.    ..........................................................................................................................................  Patient/Patient Representative Signature      ..........................................................................................................................................  Patient Representative Print Name and Relationship to Patient    ..................................................               ................................................  Date                                            Time    ..........................................................................................................................................  Reviewed by Signature/Title    ...................................................              ..............................................  Date                                                            Time

## 2018-03-19 NOTE — ED AVS SNAPSHOT
[facility], Emergency Department    [address]    [place] [address]    Phone:  [phone]    Fax:  [phone]                                       Giselle Mackenzie   MRN: [id number]    Department:  [facility], Emergency Department   Date of Visit:  3/19/2018           Patient Information     Date Of Birth          1985        Your diagnoses for this visit were:     Opioid dependence with opioid-induced disorder (H)     Methamphetamine dependence (H)     Cocaine abuse, episodic     Cough        You were seen by Amy Valdez MD.        Discharge Instructions       Take doxycycline 100 mg twice daily for your cough with dark sputum.      Rule 25 assessment was scheduled for this Wednesday.      Seek medical attention if worsening/concerns.             24 Hour Appointment Hotline       To make an appointment at any [facility] clinic, call [phone] ([phone]). If you don't have a family doctor or clinic, we will help you find one. [facility] clinics are conveniently located to serve the needs of you and your family.             Review of your medicines      START taking        Dose / Directions Last dose taken    doxycycline 100 MG capsule   Commonly known as:  VIBRAMYCIN   Dose:  100 mg   Quantity:  20 capsule        Take 1 capsule (100 mg) by mouth 2 times daily   Refills:  0          Our records show that you are taking the medicines listed below. If these are incorrect, please call your family doctor or clinic.        Dose / Directions Last dose taken    cholecalciferol 5000 UNITS Caps capsule   Commonly known as:  vitamin D3   Dose:  5000 Units   Quantity:  90 capsule        Take 1 capsule (5,000 Units) by mouth daily   Refills:  1        ferrous gluconate 324 (38 FE) MG tablet   Commonly known as:  FERGON   Dose:  324 mg   Quantity:  60 tablet        Take 1 tablet (324 mg) by mouth daily (with breakfast)   Refills:  0        METHADONE HCL PO   Dose:  85 mg        Take 85 mg by  mouth daily   Refills:  0        NORTRIPTYLINE HCL PO   Dose:  1 mg/kg/day        Take 1 mg/kg/day by mouth   Refills:  0        PROZAC PO   Dose:  30 mg        Take 30 mg by mouth   Refills:  0        senna-docusate 8.6-50 MG per tablet   Commonly known as:  SENOKOT-S;PERICOLACE   Dose:  1-2 tablet   Quantity:  60 tablet        Take 1-2 tablets by mouth 2 times daily as needed for constipation   Refills:  0        triamcinolone 0.1 % cream   Commonly known as:  KENALOG   Quantity:  80 g        Apply sparingly to affected area three times daily as needed   Refills:  0                Prescriptions were sent or printed at these locations (1 Prescription)                   Other Prescriptions                Printed at Department/Unit printer (1 of 1)         doxycycline (VIBRAMYCIN) 100 MG capsule                Orders Needing Specimen Collection     None      Pending Results     No orders found from 3/17/2018 to 3/20/2018.            Pending Culture Results     No orders found from 3/17/2018 to 3/20/2018.            Pending Results Instructions     If you had any lab results that were not finalized at the time of your Discharge, you can call the ED Lab Result RN at [phone]. You will be contacted by this team for any positive Lab results or changes in treatment. The nurses are available 7 days a week from 10A to 6:30P.  You can leave a message 24 hours per day and they will return your call.        Thank you for choosing [facility]       Thank you for choosing [facility] for your care. Our goal is always to provide you with excellent care. Hearing back from our patients is one way we can continue to improve our services. Please take a few minutes to complete the written survey that you may receive in the mail after you visit with us. Thank you!        [facility] Information     [facility] gives you secure access to your electronic health record. If you see a primary care provider, you can also send messages to your care  team and make appointments. If you have questions, please call your primary care clinic.  If you do not have a primary care provider, please call [phone] and they will assist you.        Care EveryWhere ID     This is your Care EveryWhere ID. This could be used by other organizations to access your [place] medical records  [id number]        Equal Access to Services     SHYANN SANDHU : Saul Crook, tay yee, hafsa aranda . So [facility] [phone].    ATENCIÓN: Si habla español, tiene a lewis disposición servicios gratuitos de asistencia lingüística. Llame al [phone].    We comply with applicable federal civil rights laws and [place] laws. We do not discriminate on the basis of race, color, national origin, age, disability, sex, sexual orientation, or gender identity.            After Visit Summary       This is your record. Keep this with you and show to your community pharmacist(s) and doctor(s) at your next visit.

## 2018-06-05 NOTE — PROGRESS NOTES
Please see full imaging report from [facility] program under imaging tab.        Eunice Herrera MD  Maternal Fetal Medicine    
No

## 2018-12-14 ENCOUNTER — TELEPHONE (OUTPATIENT)
Dept: OBGYN | Facility: CLINIC | Age: 33
End: 2018-12-14

## 2018-12-14 DIAGNOSIS — R87.810 CERVICAL HIGH RISK HPV (HUMAN PAPILLOMAVIRUS) TEST POSITIVE: ICD-10-CM

## 2018-12-14 NOTE — TELEPHONE ENCOUNTER
Pt is past due for f/u pap smear.  [facility] clinic and schedule.  Gabrielle Wong,    Pap Tracking

## 2019-02-15 ENCOUNTER — HEALTH MAINTENANCE LETTER (OUTPATIENT)
Age: 34
End: 2019-02-15

## 2019-02-26 ENCOUNTER — HOSPITAL ENCOUNTER (INPATIENT)
Facility: CLINIC | Age: 34
LOS: 5 days | Discharge: HOME | DRG: 982 | End: 2019-03-05
Attending: EMERGENCY MEDICINE | Admitting: HOSPITALIST

## 2019-02-26 DIAGNOSIS — F11.93 OPIOID WITHDRAWAL (H): Primary | ICD-10-CM

## 2019-02-26 DIAGNOSIS — L03.113 CELLULITIS OF RIGHT UPPER EXTREMITY: ICD-10-CM

## 2019-02-26 DIAGNOSIS — R53.83 FATIGUE, UNSPECIFIED TYPE: ICD-10-CM

## 2019-02-26 DIAGNOSIS — R68.83 CHILLS: ICD-10-CM

## 2019-02-26 DIAGNOSIS — F11.10 HEROIN ABUSE (H): ICD-10-CM

## 2019-02-26 PROCEDURE — 99285 EMERGENCY DEPT VISIT HI MDM: CPT | Mod: 25

## 2019-02-26 PROCEDURE — 76882 US LMTD JT/FCL EVL NVASC XTR: CPT

## 2019-02-26 RX ORDER — SODIUM CHLORIDE 9 MG/ML
1000 INJECTION, SOLUTION INTRAVENOUS CONTINUOUS
Status: DISCONTINUED | OUTPATIENT
Start: 2019-02-26 | End: 2019-02-27

## 2019-02-26 ASSESSMENT — MIFFLIN-ST. JEOR: SCORE: 1364.04

## 2019-02-27 PROBLEM — L03.90 CELLULITIS: Status: ACTIVE | Noted: 2019-02-27

## 2019-02-27 LAB
ANION GAP SERPL CALCULATED.3IONS-SCNC: 7 MMOL/L (ref 3–14)
BASOPHILS # BLD AUTO: 0 10E9/L (ref 0–0.2)
BASOPHILS NFR BLD AUTO: 0.4 %
BUN SERPL-MCNC: 8 MG/DL (ref 7–30)
CALCIUM SERPL-MCNC: 8.6 MG/DL (ref 8.5–10.1)
CHLORIDE SERPL-SCNC: 107 MMOL/L (ref 94–109)
CO2 SERPL-SCNC: 26 MMOL/L (ref 20–32)
CREAT SERPL-MCNC: 0.82 MG/DL (ref 0.52–1.04)
CRP SERPL-MCNC: 21.3 MG/L (ref 0–8)
DIFFERENTIAL METHOD BLD: NORMAL
EOSINOPHIL # BLD AUTO: 0.1 10E9/L (ref 0–0.7)
EOSINOPHIL NFR BLD AUTO: 1.4 %
ERYTHROCYTE [DISTWIDTH] IN BLOOD BY AUTOMATED COUNT: 13.4 % (ref 10–15)
ERYTHROCYTE [SEDIMENTATION RATE] IN BLOOD BY WESTERGREN METHOD: 10 MM/H (ref 0–20)
GFR SERPL CREATININE-BSD FRML MDRD: >90 ML/MIN/{1.73_M2}
GLUCOSE SERPL-MCNC: 99 MG/DL (ref 70–99)
HCG SERPL QL: NEGATIVE
HCT VFR BLD AUTO: 41.5 % (ref 35–47)
HGB BLD-MCNC: 14.4 G/DL (ref 11.7–15.7)
IMM GRANULOCYTES # BLD: 0 10E9/L (ref 0–0.4)
IMM GRANULOCYTES NFR BLD: 0.2 %
LACTATE BLD-SCNC: 1.8 MMOL/L (ref 0.7–2)
LYMPHOCYTES # BLD AUTO: 3.4 10E9/L (ref 0.8–5.3)
LYMPHOCYTES NFR BLD AUTO: 32.6 %
MCH RBC QN AUTO: 28.9 PG (ref 26.5–33)
MCHC RBC AUTO-ENTMCNC: 34.7 G/DL (ref 31.5–36.5)
MCV RBC AUTO: 83 FL (ref 78–100)
MONOCYTES # BLD AUTO: 0.7 10E9/L (ref 0–1.3)
MONOCYTES NFR BLD AUTO: 7 %
NEUTROPHILS # BLD AUTO: 6.1 10E9/L (ref 1.6–8.3)
NEUTROPHILS NFR BLD AUTO: 58.4 %
PLATELET # BLD AUTO: 364 10E9/L (ref 150–450)
POTASSIUM SERPL-SCNC: 3.6 MMOL/L (ref 3.4–5.3)
RBC # BLD AUTO: 4.99 10E12/L (ref 3.8–5.2)
SODIUM SERPL-SCNC: 140 MMOL/L (ref 133–144)
WBC # BLD AUTO: 10.3 10E9/L (ref 4–11)

## 2019-02-27 PROCEDURE — 83605 ASSAY OF LACTIC ACID: CPT | Performed by: EMERGENCY MEDICINE

## 2019-02-27 PROCEDURE — 25800030 ZZH RX IP 258 OP 636: Performed by: EMERGENCY MEDICINE

## 2019-02-27 PROCEDURE — 96365 THER/PROPH/DIAG IV INF INIT: CPT

## 2019-02-27 PROCEDURE — G0378 HOSPITAL OBSERVATION PER HR: HCPCS

## 2019-02-27 PROCEDURE — 84703 CHORIONIC GONADOTROPIN ASSAY: CPT | Performed by: EMERGENCY MEDICINE

## 2019-02-27 PROCEDURE — 87040 BLOOD CULTURE FOR BACTERIA: CPT | Performed by: EMERGENCY MEDICINE

## 2019-02-27 PROCEDURE — 85652 RBC SED RATE AUTOMATED: CPT | Performed by: EMERGENCY MEDICINE

## 2019-02-27 PROCEDURE — 86140 C-REACTIVE PROTEIN: CPT | Performed by: EMERGENCY MEDICINE

## 2019-02-27 PROCEDURE — 99219 ZZC INITIAL OBSERVATION CARE,LEVL II: CPT | Performed by: HOSPITALIST

## 2019-02-27 PROCEDURE — 25000128 H RX IP 250 OP 636: Performed by: EMERGENCY MEDICINE

## 2019-02-27 PROCEDURE — 96361 HYDRATE IV INFUSION ADD-ON: CPT

## 2019-02-27 PROCEDURE — 85025 COMPLETE CBC W/AUTO DIFF WBC: CPT | Performed by: EMERGENCY MEDICINE

## 2019-02-27 PROCEDURE — 80048 BASIC METABOLIC PNL TOTAL CA: CPT | Performed by: EMERGENCY MEDICINE

## 2019-02-27 PROCEDURE — 25000132 ZZH RX MED GY IP 250 OP 250 PS 637: Performed by: HOSPITALIST

## 2019-02-27 PROCEDURE — 25000131 ZZH RX MED GY IP 250 OP 636 PS 637: Performed by: HOSPITALIST

## 2019-02-27 RX ORDER — ACETAMINOPHEN 650 MG/1
650 SUPPOSITORY RECTAL EVERY 4 HOURS PRN
Status: DISCONTINUED | OUTPATIENT
Start: 2019-02-27 | End: 2019-03-05 | Stop reason: HOSPADM

## 2019-02-27 RX ORDER — ONDANSETRON 2 MG/ML
4 INJECTION INTRAMUSCULAR; INTRAVENOUS EVERY 6 HOURS PRN
Status: DISCONTINUED | OUTPATIENT
Start: 2019-02-27 | End: 2019-03-05 | Stop reason: HOSPADM

## 2019-02-27 RX ORDER — CLINDAMYCIN HCL 300 MG
300 CAPSULE ORAL 4 TIMES DAILY
Status: DISCONTINUED | OUTPATIENT
Start: 2019-02-27 | End: 2019-03-05 | Stop reason: HOSPADM

## 2019-02-27 RX ORDER — ONDANSETRON 4 MG/1
4 TABLET, ORALLY DISINTEGRATING ORAL EVERY 6 HOURS PRN
Status: DISCONTINUED | OUTPATIENT
Start: 2019-02-27 | End: 2019-03-05 | Stop reason: HOSPADM

## 2019-02-27 RX ORDER — IBUPROFEN 600 MG/1
600 TABLET, FILM COATED ORAL EVERY 6 HOURS PRN
Status: DISCONTINUED | OUTPATIENT
Start: 2019-02-27 | End: 2019-03-05 | Stop reason: HOSPADM

## 2019-02-27 RX ORDER — ACETAMINOPHEN 325 MG/1
650 TABLET ORAL EVERY 4 HOURS PRN
Status: DISCONTINUED | OUTPATIENT
Start: 2019-02-27 | End: 2019-03-05 | Stop reason: HOSPADM

## 2019-02-27 RX ORDER — HYDROMORPHONE HYDROCHLORIDE 1 MG/ML
.3-.5 INJECTION, SOLUTION INTRAMUSCULAR; INTRAVENOUS; SUBCUTANEOUS
Status: DISCONTINUED | OUTPATIENT
Start: 2019-02-27 | End: 2019-03-02

## 2019-02-27 RX ORDER — METHADONE HYDROCHLORIDE 10 MG/ML
85 CONCENTRATE ORAL ONCE
Status: ON HOLD | COMMUNITY
End: 2019-03-03

## 2019-02-27 RX ORDER — NALOXONE HYDROCHLORIDE 0.4 MG/ML
.1-.4 INJECTION, SOLUTION INTRAMUSCULAR; INTRAVENOUS; SUBCUTANEOUS
Status: DISCONTINUED | OUTPATIENT
Start: 2019-02-27 | End: 2019-03-05 | Stop reason: HOSPADM

## 2019-02-27 RX ADMIN — ONDANSETRON 4 MG: 4 TABLET, ORALLY DISINTEGRATING ORAL at 21:21

## 2019-02-27 RX ADMIN — CLINDAMYCIN HYDROCHLORIDE 300 MG: 300 CAPSULE ORAL at 09:30

## 2019-02-27 RX ADMIN — SODIUM CHLORIDE 1000 ML: 9 INJECTION, SOLUTION INTRAVENOUS at 00:26

## 2019-02-27 RX ADMIN — Medication 1 MG: at 21:21

## 2019-02-27 RX ADMIN — VANCOMYCIN HYDROCHLORIDE 1500 MG: 5 INJECTION, POWDER, LYOPHILIZED, FOR SOLUTION INTRAVENOUS at 02:06

## 2019-02-27 RX ADMIN — CLINDAMYCIN HYDROCHLORIDE 300 MG: 300 CAPSULE ORAL at 21:21

## 2019-02-27 RX ADMIN — CLINDAMYCIN HYDROCHLORIDE 300 MG: 300 CAPSULE ORAL at 16:38

## 2019-02-27 RX ADMIN — SODIUM CHLORIDE 1000 ML: 9 INJECTION, SOLUTION INTRAVENOUS at 02:07

## 2019-02-27 RX ADMIN — CLINDAMYCIN HYDROCHLORIDE 300 MG: 300 CAPSULE ORAL at 13:07

## 2019-02-27 ASSESSMENT — COLUMBIA-SUICIDE SEVERITY RATING SCALE - C-SSRS
6. HAVE YOU EVER DONE ANYTHING, STARTED TO DO ANYTHING, OR PREPARED TO DO ANYTHING TO END YOUR LIFE?: NO
1. IN THE PAST MONTH, HAVE YOU WISHED YOU WERE DEAD OR WISHED YOU COULD GO TO SLEEP AND NOT WAKE UP?: NO
2. HAVE YOU ACTUALLY HAD ANY THOUGHTS OF KILLING YOURSELF IN THE PAST MONTH?: NO

## 2019-02-27 NOTE — PHARMACY-ADMISSION MEDICATION HISTORY
Admission medication history interview status for the 2/26/2019  admission is complete. See EPIC admission navigator for prior to admission medications     Medication history source reliability:Good    Actions taken by pharmacist (provider contacted, etc): Spoke with patient. She was noncompliant. She stated that she receives Methadone from [facility]. She also stated that she has not taken any other medications for months     Additional medication history information not noted on PTA med list :  Called [facility]. Patient does not have any medications on file although patient stated that she went to [facility] in the past.   Verified Methadone dose with [facility] [phone]. Patient was physically at the clinic on 4/7/18, received a takeout for Methadone 85mg to take on 4/8/18    Medication reconciliation/reorder completed by provider prior to medication history? Yes    Time spent in this activity: 1 hour    Prior to Admission medications    Medication Sig Last Dose Taking? Auth Provider   methadone (DOLOPHINE-INTENSOL) 10 MG/ML (HIGH CONC) solution Take 85 mg by mouth once Received a takeout on 4/8/18 from [facility] clinic [phone]  Yes Unknown, Entered By History

## 2019-02-27 NOTE — PROGRESS NOTES
RECEIVING UNIT ED HANDOFF REVIEW    ED Nurse Handoff Report was reviewed by: Terrell Akhtar on February 27, 2019 at 2:23 AM

## 2019-02-27 NOTE — ED PROVIDER NOTES
"  History     Chief Complaint:  Wound check     HPI   Giselle Mackenzie is a 33 year old female with a history of substance abuse, post traumatic stress disorder, IV drug use, among others who presents with a wound check. 2 days ago, the patient noticed a lesion on her right forearm (site of IV drug use insertion) where it was starting to get raised. Today, the wound popped in the shower and pus came out. Due to concern, the patient has visited the ED for evaluation and treatment. Upon arrival, the patient reports fatigue, chills and avid heroin use. The patient denies any fever, nausea, vomiting, back pain, alcohol, suicidality, or any other wounds. She states she doesn't always use clean needles. She states she was hospitalized for for 13 days in the past for MRSA and cellulitis. The patient states she uses cocaine occasional but has not used any today. She generally shoots up around half a gram of heroin daily as she has been a heroin addict for 10 years. The patient was in treatment 1 year ago.    Allergies:  sulfa drugs  Latex  Nickel  suboxone     Medications:    vibramycin  Fergon  Methadone  Nortriptyline   Senokot  Kenalog     Past Medical History:    atention deficit disorder  Asthma  Bipolar  Cellulitis  Cervical high risk HPV  Gastro esopageal reflux disease  ETOH abuse  Intravenous drug use  OCD  Post traumatic stress disorder  Suicide attempt  Substance abuse  HSV  Hep C  syphilis    Past Surgical History:    hand surgery, MRSA  HC tooth extraction    Family History:    diabetes mellitus  Cervical cancer  Cerebrovascular disease  Alcoholism  Cirrhosis  Migraines  Coronary artery disease    Social History:  Marital Status:     Smoker:   Current daily   Smokeless:   Never   Alcohol:   No   Drugs:   Yes - marijuana, methamphetamines, cocaine, IV, \"crack\"    Review of Systems   All other systems reviewed and are negative.      Physical Exam     Patient Vitals for the past 24 hrs:   BP Temp Temp " "src Heart Rate Resp SpO2 Height Weight   02/26/19 2316 134/74 98.3  F (36.8  C) Oral 91 20 100 % 1.6 m (5' 3\") 69.5 kg (153 lb 3.2 oz)     Physical Exam  General: Resting on the bed.  Head: No obvious trauma to head.  Ears, Nose, Throat:  External ears normal.  Nose normal.    Eyes:  Conjunctivae clear.  Pupils are equal, round, and reactive.   Neck: Normal range of motion.  Neck supple.   CV: Regular rate and rhythm.  No murmurs.      Respiratory: Effort normal and breath sounds normal.  No wheezing or crackles.   Gastrointestinal: Soft.  No distension. There is no tenderness.  There is no rigidity, no rebound and no guarding.   Neuro: Alert. Moving all extremities appropriately.  Normal speech.    Skin: Skin is warm and dry.  Right posterior forearm with area of erythema, redness, warmth and active drainage.  No crepitus.    Emergency Department Course   Imaging:  Radiographic findings were communicated with the patient who voiced understanding of the findings.    US POC US soft tissue:  Abscess and Cellulitis as per radiology.     Laboratory:  0023 Lactic acid: 1.8  Blood cultures (X2): pending  BMP: WNL (Creatinine: 0.82)  Erythrocyte sedimentation rate auto: 10  CRP inflammation: 21.3  HCG: negative  CBC: WBC: 10.3, HGB: 14.4, PLT: 364    Procedures:  Results for orders placed during the hospital encounter of 02/26/19   POC US SOFT TISSUE    Impression Limited Soft Tissue Ultrasound, performed and interpreted by me.    Indication:  Skin redness warmth pain. Evaluate for cellulitis vs abscess.     Body location: right upper extremity    Findings:  There is cobblestoning suggestive of cellulitis in the evaluated area. There is a fluid collection measuring 0.4x0.6cm to suggest abscess. No foreign body identified    IMPRESSION: Abscess and Cellulitis         Interventions:  0113: NS 1L IV Bolus     Emergency Department Course:  (6012) I performed an exam of the patient as documented above.     (0115) I rechecked " the patient and discussed the results of their workup thus far.   (0127)  I consulted with Dr. Enciso of the hospitalist services. They are in agreement to accept the patient for admission.    Findings and plan explained to the Patient who consents to admission. Discussed the patient with Dr. Enciso, who will admit the patient to a OBS bed for further monitoring, evaluation, and treatment.    Impression & Plan    Medical Decision Makin-year-old female with history of IV heroin use presents with a wound infection check.  Vital signs unremarkable.  Broad differential pursued including but not limited to bacteremia, cellulitis, abscess, necrotizing fasciitis, deep space infection, endocarditis, etc. patient reports feeling fatigued and chills.  Concerned about possible systemic infection.  CBC shows no leukocytosis or any BMP shows no acute electrolyte metabolic or renal dysfunction.  Sed rate is normal not indicative of chronic inflammation at this time.  CRP is elevated which is consistent with the acute cellulitis.  Lactate is normal not concerning for necrotizing fasciitis, severe sepsis or septic shock.  UPT test is negative.  Her area of cellulitis appears to have been from a prior IV injection site.  Given her IV drug history and her systemic signs of infection opted to obtain blood cultures as well which are pending.  Point ultrasound shows the wound appears to be actively draining but there is a small remaining pocket of fluid about 0.5 cm but given that this is underlying the open lesion did not I&D was necessary.  Discussed with patient outpatient management versus observation.  I am concerned that she will not care for herself and is at risk for worsening infection therefore agree with observation.  IV vancomycin administered.  Patient was admitted to the hospitalist who graciously accepted.    Diagnosis:    ICD-10-CM    1. Cellulitis of right upper extremity L03.113    2. Heroin abuse (H) F11.10    3.  Fatigue, unspecified type R53.83    4. Chills R68.83      Disposition:  Admitted to  OBS    Scribe Disclosure:  I, Willie Cabello, am serving as a scribe on 2/26/2019 at 11:52 PM to personally document services performed by Lexy Frank MD based on my observations and the provider's statements to me.     Willie Cabello  2/26/2019    EMERGENCY DEPARTMENT       Lexy Frank MD  02/27/19 5452

## 2019-02-27 NOTE — PROGRESS NOTES
"[facility]  Hospitalist Progress Note        Paul Alessia Maciej, DO  2019        Interval History:      Patient minimally interactive on exam or interview.          Assessment and Plan:        33-year-old opiate, heroin, and amphetamine drug user who presents with cellulitis to right forearm.  There is no leukocytosis.       Right forearm cellulitis: No leukocytosis, left shift or fever here. Emergency Department, per report, the ultrasound mostly superficial cellulitis. She does have a history of MRSA.  - blood cultures pending.   - p.o. clindamycin.        Drug use: Patient is clearly an illicit drug user. No indication of withdrawal.    - Psych consulted.   - We will have  evaluate patient.   - PRN dilaudid for pain or symptoms of withdrawal.       DISPOSITION: Observation.      CODE:  Full.     Disposition: Discharge  if cleared by psych.                    Physical Exam:      Heart Rate: 91    Blood pressure 116/79, temperature 97.3  F (36.3  C), temperature source Oral, resp. rate 18, height 1.6 m (5' 3\"), weight 69.5 kg (153 lb 3.2 oz), SpO2 100 %, not currently breastfeeding.    Vitals:    19 2316   Weight: 69.5 kg (153 lb 3.2 oz)       Vital Sign Ranges  Temperature Temp  Av.8  F (36.6  C)  Min: 97.3  F (36.3  C)  Max: 98.3  F (36.8  C)   Blood pressure Systolic (24hrs), Av , Min:116 , Max:134        Diastolic (24hrs), Av, Min:74, Max:79      Pulse No Data Recorded   Respirations Resp  Av  Min: 18  Max: 20   Pulse oximetry SpO2  Av %  Min: 100 %  Max: 100 %     Vital Signs with Ranges  Temp:  [97.3  F (36.3  C)-98.3  F (36.8  C)] 97.3  F (36.3  C)  Heart Rate:  [91] 91  Resp:  [18-20] 18  BP: (116-134)/(74-79) 116/79  SpO2:  [100 %] 100 %    I/O Last 3 Shifts:   No intake/output data recorded.    I/O past 24 hours:   No intake or output data in the 24 hours ending 19 0744    GENERAL: Alert. NAD. Conversational, appropriate. "   HEENT: Normocephalic. EOMI. No icterus or injection. Nares normal.   LUNGS: Clear to auscultation. No dyspnea at rest.   HEART: Regular rate. Extremities perfused.   ABDOMEN: Soft, nontender, and nondistended. Positive bowel sounds.   EXTREMITIES: Right arm cellulitis stable, dressing in tact, c/d/i, distal cms in tact.   NEUROLOGIC: Moves extremities x4. Follows commands.          Prior to Admission Medications:        Medications Prior to Admission   Medication Sig Dispense Refill Last Dose     cholecalciferol (VITAMIN D3) 5000 UNITS CAPS capsule Take 1 capsule (5,000 Units) by mouth daily 90 capsule 1 Taking     doxycycline (VIBRAMYCIN) 100 MG capsule Take 1 capsule (100 mg) by mouth 2 times daily 20 capsule 0      ferrous gluconate (FERGON) 324 (38 FE) MG tablet Take 1 tablet (324 mg) by mouth daily (with breakfast) 60 tablet 0 Taking     FLUoxetine HCl (PROZAC PO) Take 30 mg by mouth   Taking     METHADONE HCL PO Take 85 mg by mouth daily    Taking     NORTRIPTYLINE HCL PO Take 1 mg/kg/day by mouth   Taking     senna-docusate (SENOKOT-S;PERICOLACE) 8.6-50 MG per tablet Take 1-2 tablets by mouth 2 times daily as needed for constipation 60 tablet 0 Taking     triamcinolone (KENALOG) 0.1 % cream Apply sparingly to affected area three times daily as needed 80 g 0 Taking            Medications:        Current Facility-Administered Medications   Medication Last Rate     Current Facility-Administered Medications   Medication Dose Route Frequency     clindamycin  300 mg Oral 4x Daily     Current Facility-Administered Medications   Medication Dose Route Frequency     acetaminophen  650 mg Rectal Q4H PRN     acetaminophen  650 mg Oral Q4H PRN     ibuprofen  600 mg Oral Q6H PRN     melatonin  1 mg Oral At Bedtime PRN     naloxone  0.1-0.4 mg Intravenous Q2 Min PRN     ondansetron  4 mg Oral Q6H PRN    Or     ondansetron  4 mg Intravenous Q6H PRN            Data:      Lab data reviewed.   Recent Labs   Lab 02/27/19  0023    HGB 14.4   MCV 83         POTASSIUM 3.6   CHLORIDE 107   CO2 26   BUN 8   CR 0.82   ANIONGAP 7   BRYANT 8.6   GLC 99           Imaging:      Imaging data reviewed.     Dr. Paul Wing D.O.  [facility]  Pager [phone]

## 2019-02-27 NOTE — H&P
Admitted:     02/26/2019      PRIMARY CARE PHYSICIAN:  CHICA Osborne      CHIEF COMPLAINT:  Right arm redness.      HISTORY OF PRESENT ILLNESS:  The patient is a 33-year-old female with a past medical history of heroin use who presents with right redness to her right arm.  It is along her posterior aspect of the right forearm area.  She has noticed some pus coming out this evening.  Denies fevers, chills, nausea or vomiting.  She is quite tired here.  Will not answer all questions given her being tired, but she does answer questions appropriately when challenged.      PAST MEDICAL HISTORY:   1.  Heroin use.   2.  Asthma.   3.  Bipolar.   4.  Cellulitis.   5.  GERD.   6.  Alcohol use.   7.  IV drug use.   8.  Posttraumatic stress disorder.      PAST SURGICAL HISTORY:  Hand surgery with history of MRSA.      PRIOR TO ADMIT MEDICATIONS:  Pending review by pharmacy.   1.  Methadone.   2.  Nortriptyline.      FAMILY HISTORY:  Positive for diabetes.      SOCIAL HISTORY:  Per report is  and there is history of many illicit use of marijuana, methamphetamines, cocaine.      REVIEW OF SYSTEMS:  Difficult to get a full review of systems given her being quite fatigued here, but generally as noted in HPI, comes in mostly for the right arm cellulitis.        PHYSICAL EXAMINATION:   VITAL SIGNS:  Blood pressure 134/74, temperature 98.3, heart rate 91, saturations 100%.   GENERAL:  The patient resting in bed, quite tired.   NECK:  Supple.   PULMONARY:  Clear to auscultation.   CARDIAC:  Normal S1, S2, cannot appreciate a murmur.   GASTROINTESTINAL:  Abdomen is soft, nontender.   NEUROLOGIC:  Moving all extremities.     SKIN:  Right posterior forearm area of erythema and redness.  Cannot appreciate drainage.   PSYCHIATRIC:  Appropriate affect.      LABORATORY DATA:  White count is 10.3.  There is no left shift.  CRP elevated lactic acid within normal limits.      ASSESSMENT AND PLAN:  The patient is a 33-year-old  opiate, heroin, and amphetamine drug user who presents with cellulitis to right forearm.  There is no leukocytosis.     1.  Right forearm cellulitis:  No leukocytosis, left shift or fever here.  No I and D in the Emergency Department, per report, but the ultrasound, mostly superficial cellulitis.  They do not appreciate anything too deep.  They did obtain 2 blood cultures.  Gave a dose of vancomycin in the Emergency Department.  Given her risk history she is being admitted to OBS.  I will place her on p.o. clindamycin.  She does have a history of MRSA.  At this point, with no objective systemic symptoms, we will initiate on p.o. antibiotics while we do a followup wound check and followup blood cultures.  The wound has been outlined.  If continues to improve, then can likely be discharged on clindamycin with followup.   2.  Drug use:  Patient is clearly an illicit drug user.  Could be a little bit of a washout at this point.  No indication of withdrawal.  We will observe her overnight.  We will have  evaluate patient if she would meet any criteria for chemical dependency or not.  This is a longstanding issue.   3.  Fluids, electrolytes and nutrition:  Electrolytes otherwise stable.  She will have a regular diet        DISPOSITION:  Observation.      CODE STATUS:  Full.         CANDY DO MD             D: 2019   T: 2019   MT: FATIMAH      Name:     ODELL GUZMÁN   MRN:      [id number]        Account:      [id number]   :      1985        Admitted:     2019                   Document: [id number]       cc: Mary COTO

## 2019-02-27 NOTE — ED NOTES
"[facility]  ED Nurse Handoff Report    ED Chief complaint: Wound Infection (Pt c/o abcess to right FA, started 2 days ago, hx of cellulitis and MRSA. )      ED Diagnosis:   Final diagnoses:   Cellulitis of right upper extremity   Heroin abuse (H)   Fatigue, unspecified type   Chills       Code Status: Full Code    Allergies:   Allergies   Allergen Reactions     Sulfa Drugs Anaphylaxis     Latex Hives     Nickel Hives     Suboxone      fainting       Activity level - Baseline/Home:  Independent    Activity Level - Current:   Independent     Needed?: No    Isolation: Yes  Infection: Not Applicable  MRSA  Bariatric?: No    Vital Signs:   Vitals:    02/26/19 2316   BP: 134/74   Resp: 20   Temp: 98.3  F (36.8  C)   TempSrc: Oral   SpO2: 100%   Weight: 69.5 kg (153 lb 3.2 oz)   Height: 1.6 m (5' 3\")       Cardiac Rhythm: ,        Pain level: 0-10 Pain Scale: 7    Is this patient confused?: No   Does this patient have a guardian?  No         If yes, is there guardianship documents in the Epic \"Code/ACP\" activity?  N/A         Guardian Notified?  N/A  [place] - Suicide Severity Rating Scale Completed?  Yes  If yes, what color did the patient score?  White    Patient Report: Initial Complaint: + Wound Infection (Pt c/o abcess to right FA, started 2 days ago, hx of cellulitis and MRSA, pt used heroin 3 hours PTA. Pt reports she opened the wound prior to coming to the hospital  Focused Assessment: wound infection  Tests Performed: lab, blood cultures x2  Abnormal Results:   Results for orders placed or performed during the hospital encounter of 02/26/19   CBC with platelets differential   Result Value Ref Range    WBC 10.3 4.0 - 11.0 10e9/L    RBC Count 4.99 3.8 - 5.2 10e12/L    Hemoglobin 14.4 11.7 - 15.7 g/dL    Hematocrit 41.5 35.0 - 47.0 %    MCV 83 78 - 100 fl    MCH 28.9 26.5 - 33.0 pg    MCHC 34.7 31.5 - 36.5 g/dL    RDW 13.4 10.0 - 15.0 %    Platelet Count 364 150 - 450 10e9/L    Diff " Method Automated Method     % Neutrophils 58.4 %    % Lymphocytes 32.6 %    % Monocytes 7.0 %    % Eosinophils 1.4 %    % Basophils 0.4 %    % Immature Granulocytes 0.2 %    Absolute Neutrophil 6.1 1.6 - 8.3 10e9/L    Absolute Lymphocytes 3.4 0.8 - 5.3 10e9/L    Absolute Monocytes 0.7 0.0 - 1.3 10e9/L    Absolute Eosinophils 0.1 0.0 - 0.7 10e9/L    Absolute Basophils 0.0 0.0 - 0.2 10e9/L    Abs Immature Granulocytes 0.0 0 - 0.4 10e9/L   Basic metabolic panel   Result Value Ref Range    Sodium 140 133 - 144 mmol/L    Potassium 3.6 3.4 - 5.3 mmol/L    Chloride 107 94 - 109 mmol/L    Carbon Dioxide 26 20 - 32 mmol/L    Anion Gap 7 3 - 14 mmol/L    Glucose 99 70 - 99 mg/dL    Urea Nitrogen 8 7 - 30 mg/dL    Creatinine 0.82 0.52 - 1.04 mg/dL    GFR Estimate >90 >60 mL/min/[1.73_m2]    GFR Estimate If Black >90 >60 mL/min/[1.73_m2]    Calcium 8.6 8.5 - 10.1 mg/dL   Erythrocyte sedimentation rate auto   Result Value Ref Range    Sed Rate 10 0 - 20 mm/h   CRP inflammation   Result Value Ref Range    CRP Inflammation 21.3 (H) 0.0 - 8.0 mg/L   Lactic acid whole blood   Result Value Ref Range    Lactic Acid 1.8 0.7 - 2.0 mmol/L   HCG qualitative   Result Value Ref Range    HCG Qualitative Serum Negative NEG^Negative     Treatments provided: vancomycin    Family Comments:     OBS brochure/video discussed/provided to patient/family: Yes              Name of person given brochure if not patient:               Relationship to patient:     ED Medications:   Medications   0.9% sodium chloride BOLUS (0 mLs Intravenous Stopped 2/27/19 0113)     Followed by   sodium chloride 0.9% infusion (not administered)       Drips infusing?:  No    For the majority of the shift this patient was Green.   Interventions performed were .    Severe Sepsis OR Septic Shock Diagnosis Present: No    To be done/followed up on inpatient unit:      ED NURSE PHONE NUMBER: [phone]

## 2019-02-27 NOTE — PROGRESS NOTES
SW: Consult acknowledged, chart reviewed. Pt with longstanding and recent history of polysubstance abuse, multiple treatment stays. Pt was committed for CD in Oct 2017 during her third pregnancy (parental rights terminated for two older children, unclear status of rights of third child, not currently in her care). Recommend psychiatry consult due to fairly recent commitment and ongoing use. SW to follow and assist as needed.    ELIEZER Dobson, [facility]  Daytime (8:00am-4:30pm): [phone]  After-Hours SW Pager (4:30pm-11:30pm): [phone]

## 2019-02-27 NOTE — PLAN OF CARE
AVSS. Resting comfortably between cares. LS Dim throughout. Diet Regular. Up with SBA. BS active; passing gas.

## 2019-02-28 PROBLEM — F11.93 OPIOID WITHDRAWAL (H): Status: ACTIVE | Noted: 2019-02-28

## 2019-02-28 PROCEDURE — 99232 SBSQ HOSP IP/OBS MODERATE 35: CPT | Performed by: INTERNAL MEDICINE

## 2019-02-28 PROCEDURE — 12000000 ZZH R&B MED SURG/OB

## 2019-02-28 PROCEDURE — HZ2ZZZZ DETOXIFICATION SERVICES FOR SUBSTANCE ABUSE TREATMENT: ICD-10-PCS | Performed by: INTERNAL MEDICINE

## 2019-02-28 PROCEDURE — 25000132 ZZH RX MED GY IP 250 OP 250 PS 637: Performed by: HOSPITALIST

## 2019-02-28 PROCEDURE — G0378 HOSPITAL OBSERVATION PER HR: HCPCS

## 2019-02-28 PROCEDURE — 99207 ZZC CDG-MDM COMPONENT: MEETS LOW - DOWN CODED: CPT | Performed by: INTERNAL MEDICINE

## 2019-02-28 PROCEDURE — 96372 THER/PROPH/DIAG INJ SC/IM: CPT

## 2019-02-28 PROCEDURE — 99221 1ST HOSP IP/OBS SF/LOW 40: CPT | Performed by: PSYCHIATRY & NEUROLOGY

## 2019-02-28 PROCEDURE — 25000128 H RX IP 250 OP 636: Performed by: HOSPITALIST

## 2019-02-28 PROCEDURE — 25000132 ZZH RX MED GY IP 250 OP 250 PS 637: Performed by: INTERNAL MEDICINE

## 2019-02-28 PROCEDURE — 25000132 ZZH RX MED GY IP 250 OP 250 PS 637: Performed by: PSYCHIATRY & NEUROLOGY

## 2019-02-28 RX ORDER — PROCHLORPERAZINE MALEATE 5 MG
5-10 TABLET ORAL EVERY 6 HOURS PRN
Status: DISCONTINUED | OUTPATIENT
Start: 2019-02-28 | End: 2019-03-05 | Stop reason: HOSPADM

## 2019-02-28 RX ORDER — QUETIAPINE FUMARATE 25 MG/1
50-100 TABLET, FILM COATED ORAL EVERY 4 HOURS PRN
Status: DISCONTINUED | OUTPATIENT
Start: 2019-02-28 | End: 2019-03-05 | Stop reason: HOSPADM

## 2019-02-28 RX ORDER — LOPERAMIDE HCL 2 MG
2 CAPSULE ORAL 4 TIMES DAILY PRN
Status: DISCONTINUED | OUTPATIENT
Start: 2019-02-28 | End: 2019-03-05 | Stop reason: HOSPADM

## 2019-02-28 RX ORDER — DIAZEPAM 5 MG
10 TABLET ORAL EVERY 30 MIN PRN
Status: DISCONTINUED | OUTPATIENT
Start: 2019-02-28 | End: 2019-03-05 | Stop reason: HOSPADM

## 2019-02-28 RX ORDER — PROCHLORPERAZINE 25 MG
25 SUPPOSITORY, RECTAL RECTAL EVERY 12 HOURS PRN
Status: DISCONTINUED | OUTPATIENT
Start: 2019-02-28 | End: 2019-03-05 | Stop reason: HOSPADM

## 2019-02-28 RX ORDER — OLANZAPINE 10 MG/2ML
2.5-5 INJECTION, POWDER, FOR SOLUTION INTRAMUSCULAR 2 TIMES DAILY PRN
Status: DISCONTINUED | OUTPATIENT
Start: 2019-02-28 | End: 2019-02-28

## 2019-02-28 RX ORDER — DIAZEPAM 10 MG/2ML
5-10 INJECTION, SOLUTION INTRAMUSCULAR; INTRAVENOUS EVERY 30 MIN PRN
Status: DISCONTINUED | OUTPATIENT
Start: 2019-02-28 | End: 2019-03-05 | Stop reason: HOSPADM

## 2019-02-28 RX ORDER — QUETIAPINE FUMARATE 25 MG/1
25-50 TABLET, FILM COATED ORAL EVERY 6 HOURS PRN
Status: DISCONTINUED | OUTPATIENT
Start: 2019-02-28 | End: 2019-02-28

## 2019-02-28 RX ORDER — METHADONE HYDROCHLORIDE 10 MG/1
20 TABLET ORAL ONCE
Status: COMPLETED | OUTPATIENT
Start: 2019-02-28 | End: 2019-02-28

## 2019-02-28 RX ADMIN — CLINDAMYCIN HYDROCHLORIDE 300 MG: 300 CAPSULE ORAL at 15:38

## 2019-02-28 RX ADMIN — QUETIAPINE 25 MG: 25 TABLET ORAL at 15:38

## 2019-02-28 RX ADMIN — OLANZAPINE 2.5 MG: 10 INJECTION, POWDER, FOR SOLUTION INTRAMUSCULAR at 03:06

## 2019-02-28 RX ADMIN — LOPERAMIDE HYDROCHLORIDE 2 MG: 2 CAPSULE ORAL at 17:32

## 2019-02-28 RX ADMIN — ACETAMINOPHEN 650 MG: 325 TABLET, FILM COATED ORAL at 02:35

## 2019-02-28 RX ADMIN — CLINDAMYCIN HYDROCHLORIDE 300 MG: 300 CAPSULE ORAL at 13:26

## 2019-02-28 RX ADMIN — QUETIAPINE 50 MG: 25 TABLET ORAL at 22:07

## 2019-02-28 RX ADMIN — METHADONE HYDROCHLORIDE 20 MG: 10 TABLET ORAL at 17:32

## 2019-02-28 RX ADMIN — IBUPROFEN 600 MG: 600 TABLET ORAL at 17:32

## 2019-02-28 RX ADMIN — CLINDAMYCIN HYDROCHLORIDE 300 MG: 300 CAPSULE ORAL at 19:53

## 2019-02-28 RX ADMIN — CLINDAMYCIN HYDROCHLORIDE 300 MG: 300 CAPSULE ORAL at 09:11

## 2019-02-28 ASSESSMENT — ACTIVITIES OF DAILY LIVING (ADL)
ADLS_ACUITY_SCORE: 12
ADLS_ACUITY_SCORE: 12

## 2019-02-28 NOTE — PLAN OF CARE
Pt a/o x4. VSS. Pain managed with tylenol. Anxious and restless in beginning of night, zyprexa given prn per MD orders. Rt arm wound showing scant drainage, no advancement in erythema. Will continue to monitor. Regular diet. Up with SBA. Contact for MRSA. Psych to evaluate readiness for discharge today.

## 2019-02-28 NOTE — PROVIDER NOTIFICATION
"Pt screaming out loud. Upon assessment, pt stated that she is screaming because she feels \"achy\" and going through \"withdrawal.\"  Pt also states that she is having diarrhea and feels cold and \" flushed\" as well. Pt reports that she had similar symptoms when she went through heroin withdrawal about 2 weeks, and her symptoms at that time were managed using methadone. (Pt also stated that her last drink was last week on Thursday) Dr. Erwin notified via telephone; orders received. ( 1 time PO methadone for now as well as to continue using PRN seroquel and diazepam as order per [facility] protocol.)   "

## 2019-02-28 NOTE — PLAN OF CARE
Pt is on contact precautions: MRSA. Dx: R arm cellulitis from heroine use. Hx: Bipolar disorder, ptsd, drug and alcohol abuse. VSS on RA. A/Ox4 . CMS intact. Pt denies pain this shift. Up with sba. Tolerating regular diet. Forearm red and edematous with intermittent purulent drainage. IV became dislogged during day-shift. Pt was educated on the safety importance of having an IV and she refused twice. Slept throughout shift, only awake for medication and meals. Denies N&V. +gas, +bm. Voiding adequately. LS clear, diminished in lower lobes. Will continue to monitor. Plan is to discharge tomorrow if cleared by psych.

## 2019-02-28 NOTE — PROVIDER NOTIFICATION
Page Dr. Enciso regarding growing anxiety. Pt moaning and kicking in bed.   Orders received per MD.

## 2019-02-28 NOTE — PROGRESS NOTES
[facility]    Hospitalist Progress Note    Date of Service (when I saw the patient): 02/28/2019    Assessment & Plan   Giselle Mackenzie is a 33 year old female with IVDU who presented on 2/26/2019 with RUE redness, and was admitted for right forearm cellulitis. Hospital course has been complicated by opioid withdrawal.    Purulent cellulitis of RUE, Improving  Presented with RUE redness with purulent drainage. In the ED, she was given a dose on IV vancomycin and continued on oral clindamycin  - Continues on clindamycin  - 2/27 blood cultures x2 NGTD    Acute opioid withdrawal  IV drug abuse  Active heroin use. Last used 2 days PTA. Developed s/sx of withdrawal on 2/28. Patient with longstanding and ongoing history of polysubstance abuse with multiple treatment stays, including commitment in Oct 2017.   - Methadone 20 mg PO x1  - Diazepam and Seroquel available prn for anxiety, restlessness, and agitation  - Supportive cares: antiemetics prn, Imodium prn  - SW to contact her county case manage and verify commitment status  - Psychiatry consult pending    FEN: Regular diet  DVT Prophylaxis: Pneumatic Compression Devices  Code Status: Full Code    Disposition: Expected discharge once opioid withdrawal resolves and she has been cleared by Psych, in the next 2 days    Mayra Ny Rip    Interval History   Restless and agitated overnight. Feels that she is withdrawing from heroin. Reports nausea and diarrhea. Discussed with Dr. Weems. SUMAN to look into commitment status  - Methadone 20 mg PO x1  - Start diazepam, Seroquel prn. Supportive cares with antiemetics, Imodium prn    -Data reviewed today: I reviewed all new labs and imaging results over the last 24 hours. I personally reviewed no images or EKG's today.    Physical Exam   Temp: 97.9  F (36.6  C) Temp src: Oral BP: 131/81 Pulse: (!) 47 Heart Rate: 55 Resp: 16 SpO2: 98 % O2 Device: None (Room air)    Vitals:    02/26/19 2316   Weight: 69.5 kg  (153 lb 3.2 oz)     Vital Signs with Ranges  Temp:  [97.9  F (36.6  C)-98.1  F (36.7  C)] 97.9  F (36.6  C)  Pulse:  [47-79] 47  Heart Rate:  [54-82] 55  Resp:  [16-18] 16  BP: (109-131)/(69-81) 131/81  SpO2:  [97 %-100 %] 98 %  I/O last 3 completed shifts:  In: 750 [P.O.:750]  Out: -     Constitutional: Restless, NAD  Respiratory: Breathing non-labored. Lungs CTAB - no wheezes, crackles, or rhonchi  Cardiovascular: Heart RRR, no m/r/g. No pedal edema  GI: +BS, abd soft/NT  Skin/Integumen: RUE redness improved  Neuro: Alert and appropriate, DAVIS  Psych: Restless    Medications       clindamycin  300 mg Oral 4x Daily     Data   Recent Labs   Lab 02/27/19  0023   WBC 10.3   HGB 14.4   MCV 83         POTASSIUM 3.6   CHLORIDE 107   CO2 26   BUN 8   CR 0.82   ANIONGAP 7   BRYANT 8.6   GLC 99       No results found for this or any previous visit (from the past 24 hour(s)).

## 2019-02-28 NOTE — CONSULTS
Pt seen for initial psychiatric evaluation, please see my dictation for details and recommendations. SW to contact her county  to verify her commitment status.

## 2019-03-01 LAB
ANION GAP SERPL CALCULATED.3IONS-SCNC: 5 MMOL/L (ref 3–14)
BUN SERPL-MCNC: 11 MG/DL (ref 7–30)
CALCIUM SERPL-MCNC: 8.9 MG/DL (ref 8.5–10.1)
CHLORIDE SERPL-SCNC: 113 MMOL/L (ref 94–109)
CO2 SERPL-SCNC: 23 MMOL/L (ref 20–32)
CREAT SERPL-MCNC: 0.89 MG/DL (ref 0.52–1.04)
ERYTHROCYTE [DISTWIDTH] IN BLOOD BY AUTOMATED COUNT: 13.5 % (ref 10–15)
GFR SERPL CREATININE-BSD FRML MDRD: 84 ML/MIN/{1.73_M2}
GLUCOSE SERPL-MCNC: 78 MG/DL (ref 70–99)
HCT VFR BLD AUTO: 43.4 % (ref 35–47)
HGB BLD-MCNC: 15.4 G/DL (ref 11.7–15.7)
MCH RBC QN AUTO: 29.9 PG (ref 26.5–33)
MCHC RBC AUTO-ENTMCNC: 35.5 G/DL (ref 31.5–36.5)
MCV RBC AUTO: 84 FL (ref 78–100)
PLATELET # BLD AUTO: 363 10E9/L (ref 150–450)
POTASSIUM SERPL-SCNC: 3.9 MMOL/L (ref 3.4–5.3)
RBC # BLD AUTO: 5.15 10E12/L (ref 3.8–5.2)
SODIUM SERPL-SCNC: 141 MMOL/L (ref 133–144)
WBC # BLD AUTO: 9 10E9/L (ref 4–11)

## 2019-03-01 PROCEDURE — 36415 COLL VENOUS BLD VENIPUNCTURE: CPT | Performed by: INTERNAL MEDICINE

## 2019-03-01 PROCEDURE — 93005 ELECTROCARDIOGRAM TRACING: CPT

## 2019-03-01 PROCEDURE — 99232 SBSQ HOSP IP/OBS MODERATE 35: CPT | Performed by: INTERNAL MEDICINE

## 2019-03-01 PROCEDURE — 85027 COMPLETE CBC AUTOMATED: CPT | Performed by: INTERNAL MEDICINE

## 2019-03-01 PROCEDURE — 25000132 ZZH RX MED GY IP 250 OP 250 PS 637: Performed by: HOSPITALIST

## 2019-03-01 PROCEDURE — 25000132 ZZH RX MED GY IP 250 OP 250 PS 637: Performed by: INTERNAL MEDICINE

## 2019-03-01 PROCEDURE — 99232 SBSQ HOSP IP/OBS MODERATE 35: CPT | Performed by: PSYCHIATRY & NEUROLOGY

## 2019-03-01 PROCEDURE — 12000000 ZZH R&B MED SURG/OB

## 2019-03-01 PROCEDURE — 93010 ELECTROCARDIOGRAM REPORT: CPT | Performed by: INTERNAL MEDICINE

## 2019-03-01 PROCEDURE — 80048 BASIC METABOLIC PNL TOTAL CA: CPT | Performed by: INTERNAL MEDICINE

## 2019-03-01 PROCEDURE — 25000132 ZZH RX MED GY IP 250 OP 250 PS 637: Performed by: PSYCHIATRY & NEUROLOGY

## 2019-03-01 RX ORDER — METHADONE HYDROCHLORIDE 10 MG/1
20 TABLET ORAL ONCE
Status: COMPLETED | OUTPATIENT
Start: 2019-03-01 | End: 2019-03-01

## 2019-03-01 RX ORDER — METHADONE HYDROCHLORIDE 10 MG/1
10 TABLET ORAL ONCE
Status: COMPLETED | OUTPATIENT
Start: 2019-03-02 | End: 2019-03-02

## 2019-03-01 RX ADMIN — Medication 1 MG: at 23:36

## 2019-03-01 RX ADMIN — QUETIAPINE 50 MG: 25 TABLET ORAL at 23:36

## 2019-03-01 RX ADMIN — CLINDAMYCIN HYDROCHLORIDE 300 MG: 300 CAPSULE ORAL at 21:09

## 2019-03-01 RX ADMIN — METHADONE HYDROCHLORIDE 20 MG: 10 TABLET ORAL at 11:29

## 2019-03-01 RX ADMIN — CLINDAMYCIN HYDROCHLORIDE 300 MG: 300 CAPSULE ORAL at 16:18

## 2019-03-01 RX ADMIN — CLINDAMYCIN HYDROCHLORIDE 300 MG: 300 CAPSULE ORAL at 09:10

## 2019-03-01 RX ADMIN — CLINDAMYCIN HYDROCHLORIDE 300 MG: 300 CAPSULE ORAL at 12:53

## 2019-03-01 ASSESSMENT — ACTIVITIES OF DAILY LIVING (ADL)
ADLS_ACUITY_SCORE: 12

## 2019-03-01 NOTE — CONSULTS
"Consult Date:  02/28/2019      REASON FOR CONSULTATION:  Recent commitment per nursing.      REQUESTING PHYSICIAN:  Paul Wing DO.      PRIMARY CARE PHYSICIAN:  CHICA Osborne.        IDENTIFYING DATA:  Giselle Mackenzie is a 33-year-old lady who self-presented to [facility] ED on 02/26/2019 with a history of posttraumatic stress disorder, polysubstance dependence including IVDU and reported noticing a lesion on her right forearm at the site of her IV drug use that had popped on the day of presentation with effluence of pus.  On account of her concern, she visited the ED for evaluation and treatment and reported that she was a heroin user who was also experiencing fatigue and chills.  She was assessed as having cellulitis of right upper extremity on account of which she was admitted for management.  Psychiatry was consulted because the patient has a history of civil commitment.      CHIEF COMPLAINT:  \"I don't want to talk to you.  I'm sleeping.\"      HISTORY OF PRESENT ILLNESS:  Most of the information for this assessment was obtained from chart review as well as collateral information provided by nursing staff.  The staff reported that the patient has been intermittently ornery and at other times somnolent.  She reportedly was extremely ravenous this morning and ate her breakfast with her eyes shut most of the time. Review of ED records suggest that the patient reported feeling fatigued and experiencing chills, on account of which there was concern about possible systemic infection.  Her complete blood count in the ED showed no leukocytosis or any BMP changes.  There was no evidence of renal dysfunction and her sed rate was within normal limits.  Her CRP was said to have been elevated, which was consistent with acute cellulitis.  Her lactate was normal and not concerning for necrotizing fasciitis, severe sepsis or septic shock.  A urine pregnancy test was negative.  But an area of " "cellulitis was observed on her right upper extremity at a site she had identified as prior IV injection site.  She admitted to using around half a gram of heroin daily and indicated that she has been using heroin for more than 10 years.      She reportedly was in treatment about a year prior and records obtained from Care Everywhere suggest that she had admission at [facility] in 2017, having presented there with altered mental status via EMS.  She was admitted because she was noted to be in a 3rd trimester pregnancy at that time with active heroin abuse.  A petition for civil commitment was filed on 08/15/2017 with [facility] on account of her behavioral dysregulation and her failed attempts at sobriety, having her parental rights taken away after 2 previous pregnancies and her poor insight into her immediate safety needs.  At the time, she referenced being \"disowned\" by her family, but would not elaborate on any other support she had.  Prior to that admission, she had been found asleep and severely impaired on the steps of a stranger's home and admitted to being chronically homeless and living on the streets for years.  At that time, she was estimated at being 27 weeks pregnant with no prenatal care with that being her 11th pregnancy with 2 living children.  It is unclear if the patient is still civilly committed, but this can be clarified by the unit .  She was in a crisis residence in 2018 but appears to have been lost to followup.  The records suggest prior diagnosis of posttraumatic stress disorder, unspecified depressive disorder, unspecified anxiety disorder and opioid use disorder.      PAST PSYCHIATRIC HISTORY:  As described in the history of present illness.      CHEMICAL USE HISTORY:  The records suggest that the patient has a history of at least 14 chemical dependency treatments on account of her use of methamphetamines, crack and heroin.  It " appears she was sent to treatment last year, but I cannot verify this, as the patient is not cooperative at this time.      PAST MEDICAL HISTORY:  Significant for:   1.  Polysubstance abuse.   2.  History of postpartum depression.   3.  Opioid use disorder.   4.  Chronic pain syndrome.   5.  Scoliosis.   6.  Lumbar degenerative disk disease.   7.  Fibromyalgia.   8.  History of cellulitis with MRSA.      ALLERGIES:  SULFA DRUGS, LATEX, NICKEL, SUBOXONE.      PRIOR TO ADMISSION MEDICATIONS:  Methadone 85 mg once daily, last takeout was 04/08/2018 from [facility] methadone clinic.      PAST SURGICAL HISTORY:  Hand surgery.      FAMILY PSYCHIATRIC HISTORY:  Unknown.      SOCIAL HISTORY:  It is unclear if the patient is currently homeless.  The current records suggest that she is .  The record suggests some college/technical schooling.  Primary source of social support reportedly is her significant other.  She reportedly has 3 children, 2 of which she has lost custody of, with the youngest being in foster care.  She receives food stamps and it is unclear if she is currently on civil commitment.  It appears that Child Protective Services were involved with her on account of her use of heroin while pregnant in 2017.      REVIEW OF SYSTEMS:  This could not be completed on account of the patient's lack of cooperation.      VITAL SIGNS:  Blood pressure 131/84, pulse 57, respirations 18, temperature 98.1, weight 69.5 kg.      MENTAL STATUS EXAMINATION:  This is a middle-aged woman who appears her stated age.  She is seen at bedside where she is rather somnolent, but arousable.  She makes very little eye contact and is very dismissive.  She would not respond to any queries claiming she is sleeping.  She does not display any psychomotor agitation.  The rest of this cognitive assessment is deferred on account of her lack of cooperation.      DIAGNOSTIC ASSESSMENT:  Giselle Mackenzie is a 33-year-old woman with established  history of opioid use disorder, posttraumatic stress disorder and unspecified depressive disorder, who presents to the hospital on account of a purulent lesion on her right forearm that appears to be from an infected IV drug use site.  She is currently being managed for cellulitis and appears to be in acute opioid withdrawal, given the fact that staff reports that she has been intermittently agitated, hyperphagic and irritable with associated nausea and diarrhea.      DIAGNOSES:   1.  Opioid use disorder with opioid withdrawal.   2.  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   3.  Alcohol use disorder.   4.  Posttraumatic stress disorder, chronic.   5.  Unspecified depressive disorder.   6.  Cellulitis of right forearm.      RECOMMENDATIONS:   1.  Medical management as you are.   2.  Social work to clarify THE patient's civil commitment status, With respect to disposition planning.   3.  With respect to her opiate withdrawal, it is unclear when she last had heroin but she is just presenting with symptoms of withdrawal today.  In the hospital context, she will only be able to receive 3 doses of methadone given the fact that she is not in a methadone treatment program that is verifiable at this time.  Consequently, she may receive a tapering dose of methadone over the next 3 days of 30, 20, 10, while using p.r.n. clonidine and quetiapine to mitigate agitation and discomfort.  Psychiatry may be consulted to reevaluate her tomorrow if she is more cooperative.      Thanks for the consult.         VARINDER REYES MD             D: 2019   T: 2019   MT: ASHKAN      Name:     ODELL GUZMÁN   MRN:      [id number]        Account:       [id number]   :      1985           Consult Date:  2019      Document: [id number]       cc: Mary OCTO

## 2019-03-01 NOTE — PLAN OF CARE
[id number] Pt is A/Ox4, forgetful at times. VSS on RA. CIWA 2. MRSA, contact precaution maintained. Scab/wound R arm, no drainage noted.  Pt will be transferring to 55 tonight. PRN Seroquel given for anxiety. Denies pain, N/V. +BS, no BM this shift. Up with SBA. Regular diet. Pt slept most of the shift.  Nursing will continue to monitor.

## 2019-03-01 NOTE — PROGRESS NOTES
SUMAN  D: Consult acknowledged. Attempted to meet with pt X3 but pt was sleeping and unable to be awoken. Pt is not currently under commitment but unclear as to where she will be discharging to. SUMAN will follow-up with pt after she's more awake.   P: Will continue to follow and support a safe discharge plan    Marysol Gomez MSW, LGSW

## 2019-03-01 NOTE — PROGRESS NOTES
"[facility]    Medicine Progress Note - Hospitalist Service       Date of Admission:  2/26/2019  Assessment & Plan   Giselle Mackenzie is a 33 year old female with IVDU, history of MRSA who presented on 2/26/2019 with RUE redness, and was admitted for right forearm cellulitis. Hospital course has been complicated by opioid withdrawal.     Purulent cellulitis of RUE, improving  Presented with RUE redness with purulent drainage. In the ED, she was given a dose on IV vancomycin and continued on oral clindamycin  - Continues on clindamycin PO  - Exam appears to be improving  - 2/27 blood cultures x2 NGTD     Acute opioid withdrawal  IV drug abuse  Active heroin use. Last used 2 days PTA. Developed s/sx of withdrawal on 2/28. Patient with longstanding and ongoing history of polysubstance abuse with multiple treatment stays, including commitment in Oct 2017.   - Psychiatry consulted, appreciate assistance  - Psychiatry recommending 3 dose methadone taper (30, 20, 10). Has already received 20 mg 2/28, will give 20 mg again 3/1 and 10 mg 3/2  - Check EKG for QTc  - SW involved to clarify commitment status  - Diazepam and quetiapine available PRN for anxiety, restlessness, and agitation  - Supportive cares: antiemetics prn, loperamide prn    History of MRSA  - Contact precautions     Diet: Regular Diet Adult    DVT Prophylaxis: Ambulate every shift  Pablo Catheter: not present  Code Status: Full Code      Disposition Plan   Expected discharge: 1-2 days pending resolution of withdrawal. SW involved to clarify commitment status, discussed with SW    Entered: Yang Kennedy MD 03/01/2019, 8:26 AM       The patient's care was discussed with the Bedside Nurse and Patient.    Yang Kennedy MD  Hospitalist Service  [facility]    ______________________________________________________________________    Interval History   No acute events overnight. Patient continues to feel \"like shit\" with " nausea, vomiting, diarrhea. She reports she just woke up and is unable to state if her arm feels better today. No other new symptoms.     Data reviewed today: I reviewed all medications, new labs and imaging results over the last 24 hours. I personally reviewed no images or EKG's today.    Physical Exam   Vital Signs: Temp: 98.8  F (37.1  C) Temp src: Oral BP: 100/63 Pulse: 78 Heart Rate: 78 Resp: 18 SpO2: 100 % O2 Device: None (Room air)    Weight: 153 lbs 3.2 oz    Constitutional: NAD  Respiratory: Clear to auscultation bilaterally, good air movement bilaterally  Cardiovascular: RRR, no m/r/g. No peripheral edema.  GI: Soft, non-tender, non-distended. BS normoactive.   Skin/Integumen: Warm, dry. Right forearm with erythema improving inside previously drawn lines, no significant tenderness, no purulence expressed  Other:     Data   Recent Labs   Lab 03/01/19  0656 02/27/19  0023   WBC 9.0 10.3   HGB 15.4 14.4   MCV 84 83    364    140   POTASSIUM 3.9 3.6   CHLORIDE 113* 107   CO2 23 26   BUN 11 8   CR 0.89 0.82   ANIONGAP 5 7   BRYANT 8.9 8.6   GLC 78 99       No results found for this or any previous visit (from the past 24 hour(s)).  Medications       clindamycin  300 mg Oral 4x Daily

## 2019-03-01 NOTE — PLAN OF CARE
Pt A&Ox4 but forgetful and does not respond to questions at times. VSS. R arm redness improving. Scab/wound on R arm without drainage this shift. Continues on PO abx. Pt refused lab draws this AM. Pt verbalized all she wanted to do was sleep. However, this afternoon, pt became agitated/ screaming out loud (please see MD notification). Pt received seroquel and PO methadone x1. Ibuprofen and imodium given for generalized body ache and diarrhea, respectively. CIWA score 7. No diazepam administered. Currently, calm and cooperative; resting in bed. Will continue to monitor.

## 2019-03-01 NOTE — PLAN OF CARE
Pt. A&o, vss, up independently, voiding in b.r, denied any pain, sleepy for most of the day, Will continue monitor.

## 2019-03-01 NOTE — CONSULTS
"[facility] Psychiatric Consult Progress Note    Interval History:   Pt seen, chart reviewed, case discussed with nursing staff and treating clinicians. Patient continues on abx tx for RUE cellulitis. She has received methadone per recs from psych yesterday and received a couple doses of Seroquel. Patient reports she's feeling withdrawal symptoms are currently well-controlled. Mood improving, less irritable. Tells me she's \"shreya\" homeless and her children are with family. She doesn't expand much on interview and some questions required repeated inquiries. She states she's not under commitment, telling me that ended \"one or two years ago.\" She tells me her last CD tx was at [facility] in [facility] (inpatient) about a year ago. SW still working to clarify commitment status.     Review of systems:   10 point Review of Systems completed by Andriy Braden DO, and is  is negative other than noted in the HPI     Medications:       clindamycin  300 mg Oral 4x Daily     [START ON 3/2/2019] methadone  10 mg Oral Once     methadone  20 mg Oral Once     acetaminophen, acetaminophen, diazepam **OR** diazepam, HYDROmorphone, ibuprofen, loperamide, melatonin, naloxone, ondansetron **OR** ondansetron, prochlorperazine **OR** prochlorperazine **OR** prochlorperazine, QUEtiapine    Mental Status Examination:     Appearance:  fatigued  Eye Contact:  poor  Speech:  clear, coherent  Language:Normal  Psychomotor Behavior:  no evidence of tardive dyskinesia, dystonia, or tics  Mood:  \"good\"  Affect: calmly irritable  Thought Process:  logical, linear and goal oriented no loose associations  Thought Content:  no evidence of suicidal ideation or homicidal ideation and no evidence of psychotic thought  Oriented to:  time, person, and place  Attention Span and Concentration:  intact  Recent and Remote Memory:  intact  Fund of Knowledge: appropriate  Muscle Strength and Tone: not assessed  Gait and Station:  Not " assessed  Insight:  limited  Judgment:  limited        Labs/Vitals:     Recent Results (from the past 24 hour(s))   CBC with platelets    Collection Time: 19  6:56 AM   Result Value Ref Range    WBC 9.0 4.0 - 11.0 10e9/L    RBC Count 5.15 3.8 - 5.2 10e12/L    Hemoglobin 15.4 11.7 - 15.7 g/dL    Hematocrit 43.4 35.0 - 47.0 %    MCV 84 78 - 100 fl    MCH 29.9 26.5 - 33.0 pg    MCHC 35.5 31.5 - 36.5 g/dL    RDW 13.5 10.0 - 15.0 %    Platelet Count 363 150 - 450 10e9/L   Basic metabolic panel    Collection Time: 19  6:56 AM   Result Value Ref Range    Sodium 141 133 - 144 mmol/L    Potassium 3.9 3.4 - 5.3 mmol/L    Chloride 113 (H) 94 - 109 mmol/L    Carbon Dioxide 23 20 - 32 mmol/L    Anion Gap 5 3 - 14 mmol/L    Glucose 78 70 - 99 mg/dL    Urea Nitrogen 11 7 - 30 mg/dL    Creatinine 0.89 0.52 - 1.04 mg/dL    GFR Estimate 84 >60 mL/min/[1.73_m2]    GFR Estimate If Black >90 >60 mL/min/[1.73_m2]    Calcium 8.9 8.5 - 10.1 mg/dL   EKG 12-lead, tracing only    Collection Time: 19  9:06 AM   Result Value Ref Range    Interpretation ECG Click View Image link to view waveform and result      B/P: 95/61, T: 97.6, P: 78, R: 18    Impression:   Giselle Mackenzie is a 33-year-old woman with established history of opioid use disorder, posttraumatic stress disorder and unspecified depressive disorder, who presents to the hospital on account of a purulent lesion on her right forearm that appears to be from an infected IV drug use site.  She is currently being managed for cellulitis and appears to be in acute opioid withdrawal, given the fact that staff reports that she has been intermittently agitated, hyperphagic and irritable with associated nausea and diarrhea.     3/1/19: withdrawal well-controlled at this time. SW continues to investigate commitment status (patient reports it  1-2 yrs ago, however her reliability in this regard is low).      DIagnoses:   1.  Opioid use disorder with opioid withdrawal.    2.  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   3.  Alcohol use disorder.   4.  Posttraumatic stress disorder, chronic.   5.  Unspecified depressive disorder.   6.  Cellulitis of right forearm.              Plan:   1. Continue methadone taper  2. Continue Seroquel PRN, finding benefit for irritability  3. SW to clarify commitment status      Attestation:  Patient has been seen and evaluated by me,  Andriy Braden,

## 2019-03-01 NOTE — PLAN OF CARE
Arrived to floor at 2230. A/O x4, flat affect. AVSS on RA. CIWA 1, 0, 0. R forearm wound, dried and scabbed, no drainage this shift. Up w/SBA to BR. Psych consult. Discharge pending clinical course. Will continue to monitor.

## 2019-03-02 PROCEDURE — 99232 SBSQ HOSP IP/OBS MODERATE 35: CPT | Performed by: HOSPITALIST

## 2019-03-02 PROCEDURE — 12000000 ZZH R&B MED SURG/OB

## 2019-03-02 PROCEDURE — 25000132 ZZH RX MED GY IP 250 OP 250 PS 637: Performed by: HOSPITALIST

## 2019-03-02 PROCEDURE — 25000132 ZZH RX MED GY IP 250 OP 250 PS 637: Performed by: PSYCHIATRY & NEUROLOGY

## 2019-03-02 PROCEDURE — 25000132 ZZH RX MED GY IP 250 OP 250 PS 637: Performed by: INTERNAL MEDICINE

## 2019-03-02 RX ADMIN — CLINDAMYCIN HYDROCHLORIDE 300 MG: 300 CAPSULE ORAL at 20:58

## 2019-03-02 RX ADMIN — CLINDAMYCIN HYDROCHLORIDE 300 MG: 300 CAPSULE ORAL at 09:12

## 2019-03-02 RX ADMIN — CLINDAMYCIN HYDROCHLORIDE 300 MG: 300 CAPSULE ORAL at 13:42

## 2019-03-02 RX ADMIN — QUETIAPINE 50 MG: 25 TABLET ORAL at 22:44

## 2019-03-02 RX ADMIN — CLINDAMYCIN HYDROCHLORIDE 300 MG: 300 CAPSULE ORAL at 17:32

## 2019-03-02 RX ADMIN — METHADONE HYDROCHLORIDE 10 MG: 10 TABLET ORAL at 09:12

## 2019-03-02 RX ADMIN — QUETIAPINE 50 MG: 25 TABLET ORAL at 17:32

## 2019-03-02 ASSESSMENT — ACTIVITIES OF DAILY LIVING (ADL)
RETIRED_COMMUNICATION: 0-->UNDERSTANDS/COMMUNICATES WITHOUT DIFFICULTY
BATHING: 0-->INDEPENDENT
SWALLOWING: 0-->SWALLOWS FOODS/LIQUIDS WITHOUT DIFFICULTY
ADLS_ACUITY_SCORE: 12
ADLS_ACUITY_SCORE: 12
TOILETING: 0-->INDEPENDENT
COGNITION: 0 - NO COGNITION ISSUES REPORTED
ADLS_ACUITY_SCORE: 12
ADLS_ACUITY_SCORE: 12
RETIRED_EATING: 0-->INDEPENDENT
DRESS: 0-->INDEPENDENT
ADLS_ACUITY_SCORE: 12
ADLS_ACUITY_SCORE: 10

## 2019-03-02 NOTE — PLAN OF CARE
A/o x4. Independently positioning. VSS on RA. CMS intact with mild edema and redness to RUE. Site RUFINA. C/DI. Denies pain. Progressing per POC. Will cont to monitor.

## 2019-03-02 NOTE — PROGRESS NOTES
[facility]    Hospitalist Progress Note      Assessment & Plan   Giselle Mackenzie is a 33 year old female who was admitted on 2/26/2019 for right forearm cellulitis, complicated by opioid withdrawal.    Purulent cellulitis of RUE, improving  History MRSA  Presented with RUE redness with purulent drainage. In the ED, she was given a dose of IV vancomycin and continued on oral clindamycin. Hx MRSA--on contact precautions  - Continues on clindamycin PO, will complete 10 day course (3/9/19 evening)  -  Exam continues to improve  - 2/27 blood cultures x2 NGTD     Acute opioid withdrawal  IV drug abuse  Active heroin use. Last used 2 days PTA. Developed s/sx of withdrawal on 2/28. Patient with longstanding and ongoing history of polysubstance abuse with multiple treatment stays, including commitment in Oct 2017.   - Psychiatry consulted, appreciate assistance  - Psychiatry recommended 3 dose methadone taper (30, 20, 10). Received 20 mg 2/28, 20 mg 3/1 and 10 mg 3/2 (last dose)  - QTc 445  - SW involved, patient does not have a commitment status. Plans to return to hotel with her friends on discharge  - Diazepam and quetiapine available PRN for anxiety, restlessness, and agitation  - Supportive cares: antiemetics prn, loperamide prn  - Plan for discharge in AM, patient agreeable.    DVT Prophylaxis: Low Risk/Ambulatory with no VTE prophylaxis indicated  Code Status: Full Code  Expected discharge: Tomorrow, recommended to prior living arrangement once withdrawal resolved    Sia Babb, DO  Text Page (7am - 6pm)    Interval History   Patient seen and examined. Feels like crap today, nauseous (but ate majority of her breakfast). Sweaty, vomiting. Arm feels better today. Discussed discharge plans for tomorrow AM and she is agreeable. She is sleeping often    -Data reviewed today: I reviewed all new labs and imaging results over the last 24 hours. I personally reviewed EKG with QTc 445    Physical Exam    Temp: 97.8  F (36.6  C) Temp src: Oral BP: 119/76 Pulse: 63 Heart Rate: 98 Resp: 16 SpO2: 97 % O2 Device: None (Room air)    Vitals:    02/26/19 2316   Weight: 69.5 kg (153 lb 3.2 oz)     Vital Signs with Ranges  Temp:  [97.8  F (36.6  C)-98.2  F (36.8  C)] 97.8  F (36.6  C)  Pulse:  [63] 63  Heart Rate:  [98] 98  Resp:  [16] 16  BP: (103-119)/(66-76) 119/76  SpO2:  [94 %-97 %] 97 %  I/O last 3 completed shifts:  In: 390 [P.O.:390]  Out: -     Constitutional: Awakens easily, cooperative, no apparent distress  Respiratory: Clear to auscultation bilaterally, no crackles or wheezing  Cardiovascular: Regular rate and rhythm, normal S1 and S2, and no murmur noted  GI: Normal bowel sounds, soft, non-distended, non-tender  Skin/Integumen: right forearm with outlined area, erythema improved, well inside demarcated area. No tenderness or drainage.  Other:     Medications       clindamycin  300 mg Oral 4x Daily       Data   Recent Labs   Lab 03/01/19  0656 02/27/19  0023   WBC 9.0 10.3   HGB 15.4 14.4   MCV 84 83    364    140   POTASSIUM 3.9 3.6   CHLORIDE 113* 107   CO2 23 26   BUN 11 8   CR 0.89 0.82   ANIONGAP 5 7   BRYANT 8.9 8.6   GLC 78 99       No results found for this or any previous visit (from the past 24 hour(s)).

## 2019-03-02 NOTE — PROGRESS NOTES
SUMAN    D: SUMAN following for discharge needs. SW met with patient to discuss discharge plan. Patient reports that she is planning to return to [facility] in [place] and will have her friends transport her from the hospital. Patient then requested for SW to return at a later time as she is not feeling up to discussion at this time. SW will check back in with patient    P: SW will follow-up with patient.

## 2019-03-03 PROCEDURE — 25000132 ZZH RX MED GY IP 250 OP 250 PS 637: Performed by: HOSPITALIST

## 2019-03-03 PROCEDURE — 99221 1ST HOSP IP/OBS SF/LOW 40: CPT | Mod: 57 | Performed by: SURGERY

## 2019-03-03 PROCEDURE — 25000132 ZZH RX MED GY IP 250 OP 250 PS 637: Performed by: PSYCHIATRY & NEUROLOGY

## 2019-03-03 PROCEDURE — 12000000 ZZH R&B MED SURG/OB

## 2019-03-03 PROCEDURE — 99232 SBSQ HOSP IP/OBS MODERATE 35: CPT | Performed by: HOSPITALIST

## 2019-03-03 RX ORDER — CLINDAMYCIN HCL 300 MG
300 CAPSULE ORAL 4 TIMES DAILY
Qty: 24 CAPSULE | Refills: 0 | Status: SHIPPED | OUTPATIENT
Start: 2019-03-03 | End: 2019-03-05

## 2019-03-03 RX ORDER — QUETIAPINE FUMARATE 50 MG/1
50 TABLET, FILM COATED ORAL EVERY 6 HOURS PRN
Qty: 8 TABLET | Refills: 0 | Status: SHIPPED | OUTPATIENT
Start: 2019-03-03 | End: 2020-10-04

## 2019-03-03 RX ADMIN — QUETIAPINE 50 MG: 25 TABLET ORAL at 02:36

## 2019-03-03 RX ADMIN — CLINDAMYCIN HYDROCHLORIDE 300 MG: 300 CAPSULE ORAL at 14:15

## 2019-03-03 RX ADMIN — CLINDAMYCIN HYDROCHLORIDE 300 MG: 300 CAPSULE ORAL at 19:37

## 2019-03-03 RX ADMIN — QUETIAPINE 50 MG: 25 TABLET ORAL at 19:44

## 2019-03-03 RX ADMIN — QUETIAPINE 50 MG: 25 TABLET ORAL at 14:17

## 2019-03-03 RX ADMIN — CLINDAMYCIN HYDROCHLORIDE 300 MG: 300 CAPSULE ORAL at 10:39

## 2019-03-03 RX ADMIN — IBUPROFEN 600 MG: 600 TABLET ORAL at 19:37

## 2019-03-03 RX ADMIN — CLINDAMYCIN HYDROCHLORIDE 300 MG: 300 CAPSULE ORAL at 16:05

## 2019-03-03 RX ADMIN — ACETAMINOPHEN 650 MG: 325 TABLET, FILM COATED ORAL at 16:07

## 2019-03-03 ASSESSMENT — ACTIVITIES OF DAILY LIVING (ADL)
ADLS_ACUITY_SCORE: 10

## 2019-03-03 NOTE — PLAN OF CARE
Skin or Soft Tissue Infection  Infection Symptom Resolution  3/2/2019 1850 - Improving by Ruth Starks, RN   Receiving PO antibiotics at this time, redness improving. Continue to monitor. CIWA 0

## 2019-03-03 NOTE — PLAN OF CARE
Up independently or with SBA.  Seroquel given per patient request.  Right arm is warm and swollen, paitent to go to the or tomorrow.

## 2019-03-03 NOTE — PROGRESS NOTES
[facility]    Hospitalist Progress Note      Assessment & Plan   Giselle Mackenzie is a 33 year old female who was admitted on 2/26/2019 for right forearm cellulitis, complicated by opioid withdrawal.    Purulent cellulitis of RUE  Abscess right upper extremity  History MRSA  Presented with RUE redness with purulent drainage. In the ED, she was given a dose of IV vancomycin and continued on oral clindamycin. Hx MRSA--on contact precautions  - Exam worse on 3/3/19, had been on clindamycin QID--continue for now (from WBC and clinical stand point she has been stable)  - 2/27 blood cultures x2 NGTD  - Consult gen surg for abscess drainage, appreciate recs, plan for OR for I&D under anesthesia 3/4     Acute opioid withdrawal  IV drug abuse  Active heroin use. Last used 2 days PTA. Developed s/sx of withdrawal on 2/28. Patient with longstanding and ongoing history of polysubstance abuse with multiple treatment stays, including commitment in Oct 2017.   - Psychiatry consulted, appreciate assistance  - Psychiatry recommended 3 dose methadone taper (30, 20, 10). Received 20 mg 2/28, 20 mg 3/1 and 10 mg 3/2 (last dose)  - SW involved, patient does not have a commitment status. Plans to return to hotel with her friends on discharge  - Diazepam and quetiapine available PRN for anxiety, restlessness, and agitation  - Supportive cares: antiemetics prn, loperamide prn    DVT Prophylaxis: Low Risk/Ambulatory with no VTE prophylaxis indicated  Code Status: Full Code  Expected discharge: 24-48 hours, recommended to prior living arrangement once withdrawal resolved    Sia Babb, DO  Text Page (7am - 6pm)    Interval History   Patient seen and examined. Feels worse today, more nauseous. Found out of her room overnight with a coat on, not suspected to have left the hospital or the unit. Requested seroquel frequently overnight for agitation/anxiety. New area of erythema, cellulitis medial arm extremely tender. She  states that it was a small red yanelis that was from an IV start that got worse overnight. Gen surg evaluated and plan for I&D of area on 3/4    -Data reviewed today: I reviewed all new labs and imaging results over the last 24 hours. I personally reviewed no new imaging or EKGs    Physical Exam   Temp: 97.7  F (36.5  C) Temp src: Oral BP: 105/60 Pulse: 85 Heart Rate: 93 Resp: 16 SpO2: 96 % O2 Device: None (Room air)    Vitals:    02/26/19 2316   Weight: 69.5 kg (153 lb 3.2 oz)     Vital Signs with Ranges  Temp:  [97.7  F (36.5  C)-98.8  F (37.1  C)] 97.7  F (36.5  C)  Pulse:  [85-98] 85  Heart Rate:  [59-93] 93  Resp:  [16] 16  BP: ()/(54-60) 105/60  SpO2:  [96 %-100 %] 96 %  I/O last 3 completed shifts:  In: 810 [P.O.:810]  Out: -     Constitutional: Awakens easily, cooperative, no apparent distress  Respiratory: Clear to auscultation bilaterally, no crackles or wheezing  Cardiovascular: Regular rate and rhythm, normal S1 and S2, and no murmur noted  GI: Normal bowel sounds, soft, non-distended, non-tender  Skin/Integumen: right forearm with outlined area, erythema improved, well inside demarcated area. No tenderness or drainage. Medial arm with now raised area ~3cm cellulitis with fluctuant center, blanchable, very tender to touch.  Other:     Medications       clindamycin  300 mg Oral 4x Daily       Data   Recent Labs   Lab 03/01/19  0656 02/27/19  0023   WBC 9.0 10.3   HGB 15.4 14.4   MCV 84 83    364    140   POTASSIUM 3.9 3.6   CHLORIDE 113* 107   CO2 23 26   BUN 11 8   CR 0.89 0.82   ANIONGAP 5 7   BRYANT 8.9 8.6   GLC 78 99       No results found for this or any previous visit (from the past 24 hour(s)).

## 2019-03-03 NOTE — PROGRESS NOTES
"Seroquel was given to pt @ 0230 for agitation and anxiety. @ 0315 pt was seen walking in [place] to pt room with coat on. Pt was approached asking where she'd been. Pt replied \"I have so much anxiety I need to walk in hallway\" Pt denies leaving unit or going outside. Pt educated on letting staff know when she's going to walk. Pt verbalized understanding.   "

## 2019-03-03 NOTE — CONSULTS
"Surgery Consultation, [facility]         Gideon Black MD    Giselle Mackenzie MRN# [id number]   YOB: 1985 Age: 33 year old     PCP:  Mary Carter [phone]    Chief Complaint: Right forearm abscess    Pt was seen in consultation from Mary Carter.    History of Present Illness:  Giselle Mackenzie is a 33 year old female who presented with right upper extremity cellulitis and narcotics withdrawal.  She reported using narcotics as recently as 2 days prior to presentation.  She was initially noted to have some cellulitis in her right upper extremity but this appears to have evolved.  Initial ultrasound showed a very small 6 mm fluid collection and cellulitis.  White blood cell count is normal, no fevers.    PMH:  Giselle Mackenzie  has a past medical history of ADD (attention deficit disorder), Asthma, Bipolar disorder, unspecified (H), Cellulitis (2013), Cervical high risk HPV (human papillomavirus) test positive (12/07/2017), GERD (gastroesophageal reflux disease), H/O ETOH abuse, H/O intravenous drug use in remission, OCD (obsessive compulsive disorder), and PTSD (post-traumatic stress disorder).  PSH:  Giselle Mackenzie  has a past surgical history that includes TOOTH EXTRACTION W/FORCEP and Hand surgery (Right).    Home medications and allergies reviewed.    Social History:  Giselle Mackenzie  reports that she has been smoking.  She has been smoking about 0.25 packs per day. she has never used smokeless tobacco. She reports that she uses drugs. Drugs: Marijuana, Methamphetamines, Cocaine, IV, and \"Crack\" cocaine. She reports that she does not drink alcohol.  Family History:  Giselle Mackenzie family history includes Alcoholism in her paternal uncle and paternal uncle; Cerebrovascular Disease in her maternal grandfather; Cervical Cancer in her maternal grandmother; Cirrhosis in her paternal uncle and paternal uncle; Coronary Artery Disease Early Onset " "(age of onset: 55) in her paternal grandfather; Diabetes in her mother; Migraines in her paternal grandfather.    ROS:  The 10 point Review of Systems is negative other than noted in the HPI.  No fevers or chills.    Physical Exam:  Blood pressure 105/60, pulse 85, temperature 97.7  F (36.5  C), temperature source Oral, resp. rate 16, height 1.6 m (5' 3\"), weight 69.5 kg (153 lb 3.2 oz), SpO2 96 %, not currently breastfeeding.  153 lbs 3.2 oz  Disheveled young female in no distress.  Difficult to arouse.  Patient has blunted, irritable affect.  Pupils equal round and reactive to light.   No cervical lymphadenopathy or thyromegaly.   Lung fields clear, breathing comfortably.   Heart normal sinus rhythm.  No murmurs rubs or gallops.  Skin warm, dry.  Area of redness on the right forearm with a 3 cm blanching area in the center consistent with probable abscess.    All new lab and imaging data was reviewed.  This includes lab values and previous ultrasound     Assessment/plan: 33-year-old female with multiple medical and social issues presents with a right forearm abscess.  This is likely related to previous drug use.  I think this requires incision and drainage but patient was unable to allow me to inspect of the wound and any detail due to pain.  I do not think she would tolerate a bedside incision and drainage.  Unfortunately, she just ate.  I will get her on the schedule for a incision and drainage in the operating room tomorrow with IV sedation.  Surgical co-morbities include GERD, ADD, narcotics abuse.    Eddie Black M.D.  [facility]  [phone]    Please route or send letter to:  Primary Care Provider (PCP) and Referring Provider  "

## 2019-03-03 NOTE — PLAN OF CARE
A/O x4.  Independently positioning. VSS on RA. CMS intact. RUE abscess site red. New site noted. Swollen, hard and painful per pt report. Warm compress applied for pain relief. Voiding adequately per BR. Seroquel given. CIWA score 2. Plan to discharge 3/3/2019.

## 2019-03-04 ENCOUNTER — ANESTHESIA (OUTPATIENT)
Dept: SURGERY | Facility: CLINIC | Age: 34
DRG: 982 | End: 2019-03-04

## 2019-03-04 ENCOUNTER — ANESTHESIA EVENT (OUTPATIENT)
Dept: SURGERY | Facility: CLINIC | Age: 34
DRG: 982 | End: 2019-03-04

## 2019-03-04 LAB
ANION GAP SERPL CALCULATED.3IONS-SCNC: 8 MMOL/L (ref 3–14)
BUN SERPL-MCNC: 12 MG/DL (ref 7–30)
CALCIUM SERPL-MCNC: 8.5 MG/DL (ref 8.5–10.1)
CHLORIDE SERPL-SCNC: 108 MMOL/L (ref 94–109)
CO2 SERPL-SCNC: 23 MMOL/L (ref 20–32)
CREAT SERPL-MCNC: 0.84 MG/DL (ref 0.52–1.04)
ERYTHROCYTE [DISTWIDTH] IN BLOOD BY AUTOMATED COUNT: 13.2 % (ref 10–15)
GFR SERPL CREATININE-BSD FRML MDRD: >90 ML/MIN/{1.73_M2}
GLUCOSE SERPL-MCNC: 88 MG/DL (ref 70–99)
GRAM STN SPEC: ABNORMAL
HCG SERPL QL: NEGATIVE
HCT VFR BLD AUTO: 42 % (ref 35–47)
HGB BLD-MCNC: 14.4 G/DL (ref 11.7–15.7)
Lab: ABNORMAL
MCH RBC QN AUTO: 29.1 PG (ref 26.5–33)
MCHC RBC AUTO-ENTMCNC: 34.3 G/DL (ref 31.5–36.5)
MCV RBC AUTO: 85 FL (ref 78–100)
PLATELET # BLD AUTO: 320 10E9/L (ref 150–450)
POTASSIUM SERPL-SCNC: 3.9 MMOL/L (ref 3.4–5.3)
RBC # BLD AUTO: 4.94 10E12/L (ref 3.8–5.2)
SODIUM SERPL-SCNC: 139 MMOL/L (ref 133–144)
SPECIMEN SOURCE: ABNORMAL
WBC # BLD AUTO: 10.6 10E9/L (ref 4–11)

## 2019-03-04 PROCEDURE — 12000000 ZZH R&B MED SURG/OB

## 2019-03-04 PROCEDURE — 87076 CULTURE ANAEROBE IDENT EACH: CPT | Performed by: SURGERY

## 2019-03-04 PROCEDURE — 36415 COLL VENOUS BLD VENIPUNCTURE: CPT | Performed by: ANESTHESIOLOGY

## 2019-03-04 PROCEDURE — 36000052 ZZH SURGERY LEVEL 2 EA 15 ADDTL MIN: Performed by: SURGERY

## 2019-03-04 PROCEDURE — 25000128 H RX IP 250 OP 636: Performed by: ANESTHESIOLOGY

## 2019-03-04 PROCEDURE — 10061 I&D ABSCESS COMP/MULTIPLE: CPT | Performed by: SURGERY

## 2019-03-04 PROCEDURE — 0KB90ZZ EXCISION OF RIGHT LOWER ARM AND WRIST MUSCLE, OPEN APPROACH: ICD-10-PCS | Performed by: SURGERY

## 2019-03-04 PROCEDURE — 87070 CULTURE OTHR SPECIMN AEROBIC: CPT | Performed by: SURGERY

## 2019-03-04 PROCEDURE — 36000050 ZZH SURGERY LEVEL 2 1ST 30 MIN: Performed by: SURGERY

## 2019-03-04 PROCEDURE — 25000125 ZZHC RX 250: Performed by: SURGERY

## 2019-03-04 PROCEDURE — 25000128 H RX IP 250 OP 636: Performed by: HOSPITALIST

## 2019-03-04 PROCEDURE — 84703 CHORIONIC GONADOTROPIN ASSAY: CPT | Performed by: ANESTHESIOLOGY

## 2019-03-04 PROCEDURE — 25800030 ZZH RX IP 258 OP 636: Performed by: NURSE ANESTHETIST, CERTIFIED REGISTERED

## 2019-03-04 PROCEDURE — 40000169 ZZH STATISTIC PRE-PROCEDURE ASSESSMENT I: Performed by: SURGERY

## 2019-03-04 PROCEDURE — 80048 BASIC METABOLIC PNL TOTAL CA: CPT | Performed by: HOSPITALIST

## 2019-03-04 PROCEDURE — 25000125 ZZHC RX 250: Performed by: NURSE ANESTHETIST, CERTIFIED REGISTERED

## 2019-03-04 PROCEDURE — 87077 CULTURE AEROBIC IDENTIFY: CPT | Performed by: SURGERY

## 2019-03-04 PROCEDURE — 25000128 H RX IP 250 OP 636: Performed by: SURGERY

## 2019-03-04 PROCEDURE — 87205 SMEAR GRAM STAIN: CPT | Performed by: SURGERY

## 2019-03-04 PROCEDURE — 85027 COMPLETE CBC AUTOMATED: CPT | Performed by: HOSPITALIST

## 2019-03-04 PROCEDURE — 87075 CULTR BACTERIA EXCEPT BLOOD: CPT | Performed by: SURGERY

## 2019-03-04 PROCEDURE — 36415 COLL VENOUS BLD VENIPUNCTURE: CPT | Performed by: HOSPITALIST

## 2019-03-04 PROCEDURE — 71000012 ZZH RECOVERY PHASE 1 LEVEL 1 FIRST HR: Performed by: SURGERY

## 2019-03-04 PROCEDURE — 37000009 ZZH ANESTHESIA TECHNICAL FEE, EACH ADDTL 15 MIN: Performed by: SURGERY

## 2019-03-04 PROCEDURE — 87186 SC STD MICRODIL/AGAR DIL: CPT | Performed by: SURGERY

## 2019-03-04 PROCEDURE — 25800030 ZZH RX IP 258 OP 636: Performed by: ANESTHESIOLOGY

## 2019-03-04 PROCEDURE — 27210794 ZZH OR GENERAL SUPPLY STERILE: Performed by: SURGERY

## 2019-03-04 PROCEDURE — 37000008 ZZH ANESTHESIA TECHNICAL FEE, 1ST 30 MIN: Performed by: SURGERY

## 2019-03-04 PROCEDURE — 87181 SC STD AGAR DILUTION PER AGT: CPT | Performed by: SURGERY

## 2019-03-04 PROCEDURE — 25000128 H RX IP 250 OP 636: Performed by: NURSE ANESTHETIST, CERTIFIED REGISTERED

## 2019-03-04 PROCEDURE — 25000132 ZZH RX MED GY IP 250 OP 250 PS 637: Performed by: HOSPITALIST

## 2019-03-04 PROCEDURE — 99232 SBSQ HOSP IP/OBS MODERATE 35: CPT | Performed by: HOSPITALIST

## 2019-03-04 PROCEDURE — 25000132 ZZH RX MED GY IP 250 OP 250 PS 637: Performed by: PSYCHIATRY & NEUROLOGY

## 2019-03-04 RX ORDER — FENTANYL CITRATE 50 UG/ML
INJECTION, SOLUTION INTRAMUSCULAR; INTRAVENOUS PRN
Status: DISCONTINUED | OUTPATIENT
Start: 2019-03-04 | End: 2019-03-04

## 2019-03-04 RX ORDER — SODIUM CHLORIDE, SODIUM LACTATE, POTASSIUM CHLORIDE, CALCIUM CHLORIDE 600; 310; 30; 20 MG/100ML; MG/100ML; MG/100ML; MG/100ML
INJECTION, SOLUTION INTRAVENOUS CONTINUOUS
Status: DISCONTINUED | OUTPATIENT
Start: 2019-03-04 | End: 2019-03-04 | Stop reason: HOSPADM

## 2019-03-04 RX ORDER — MAGNESIUM HYDROXIDE 1200 MG/15ML
LIQUID ORAL PRN
Status: DISCONTINUED | OUTPATIENT
Start: 2019-03-04 | End: 2019-03-04 | Stop reason: HOSPADM

## 2019-03-04 RX ORDER — SODIUM CHLORIDE, SODIUM LACTATE, POTASSIUM CHLORIDE, CALCIUM CHLORIDE 600; 310; 30; 20 MG/100ML; MG/100ML; MG/100ML; MG/100ML
INJECTION, SOLUTION INTRAVENOUS CONTINUOUS
Status: DISCONTINUED | OUTPATIENT
Start: 2019-03-04 | End: 2019-03-05

## 2019-03-04 RX ORDER — ONDANSETRON 2 MG/ML
4 INJECTION INTRAMUSCULAR; INTRAVENOUS EVERY 30 MIN PRN
Status: DISCONTINUED | OUTPATIENT
Start: 2019-03-04 | End: 2019-03-04 | Stop reason: HOSPADM

## 2019-03-04 RX ORDER — ONDANSETRON 4 MG/1
4 TABLET, ORALLY DISINTEGRATING ORAL EVERY 30 MIN PRN
Status: DISCONTINUED | OUTPATIENT
Start: 2019-03-04 | End: 2019-03-04 | Stop reason: HOSPADM

## 2019-03-04 RX ORDER — NICOTINE 21 MG/24HR
1 PATCH, TRANSDERMAL 24 HOURS TRANSDERMAL DAILY
Status: DISCONTINUED | OUTPATIENT
Start: 2019-03-04 | End: 2019-03-05

## 2019-03-04 RX ORDER — FENTANYL CITRATE 50 UG/ML
25-50 INJECTION, SOLUTION INTRAMUSCULAR; INTRAVENOUS
Status: DISCONTINUED | OUTPATIENT
Start: 2019-03-04 | End: 2019-03-04 | Stop reason: HOSPADM

## 2019-03-04 RX ORDER — CEFAZOLIN SODIUM 2 G/100ML
2 INJECTION, SOLUTION INTRAVENOUS
Status: COMPLETED | OUTPATIENT
Start: 2019-03-04 | End: 2019-03-04

## 2019-03-04 RX ORDER — PROPOFOL 10 MG/ML
INJECTION, EMULSION INTRAVENOUS CONTINUOUS PRN
Status: DISCONTINUED | OUTPATIENT
Start: 2019-03-04 | End: 2019-03-04

## 2019-03-04 RX ADMIN — CEFAZOLIN SODIUM 2 G: 2 INJECTION, SOLUTION INTRAVENOUS at 09:15

## 2019-03-04 RX ADMIN — CLINDAMYCIN HYDROCHLORIDE 300 MG: 300 CAPSULE ORAL at 20:07

## 2019-03-04 RX ADMIN — CLINDAMYCIN HYDROCHLORIDE 300 MG: 300 CAPSULE ORAL at 11:18

## 2019-03-04 RX ADMIN — PROPOFOL 100 MCG/KG/MIN: 10 INJECTION, EMULSION INTRAVENOUS at 09:12

## 2019-03-04 RX ADMIN — CLINDAMYCIN HYDROCHLORIDE 300 MG: 300 CAPSULE ORAL at 16:27

## 2019-03-04 RX ADMIN — QUETIAPINE 50 MG: 25 TABLET ORAL at 00:10

## 2019-03-04 RX ADMIN — SODIUM CHLORIDE, POTASSIUM CHLORIDE, SODIUM LACTATE AND CALCIUM CHLORIDE: 600; 310; 30; 20 INJECTION, SOLUTION INTRAVENOUS at 08:44

## 2019-03-04 RX ADMIN — ACETAMINOPHEN 650 MG: 325 TABLET, FILM COATED ORAL at 20:09

## 2019-03-04 RX ADMIN — FENTANYL CITRATE 50 MCG: 50 INJECTION, SOLUTION INTRAMUSCULAR; INTRAVENOUS at 10:15

## 2019-03-04 RX ADMIN — SODIUM CHLORIDE, POTASSIUM CHLORIDE, SODIUM LACTATE AND CALCIUM CHLORIDE: 600; 310; 30; 20 INJECTION, SOLUTION INTRAVENOUS at 11:09

## 2019-03-04 RX ADMIN — MIDAZOLAM 2 MG: 1 INJECTION INTRAMUSCULAR; INTRAVENOUS at 09:06

## 2019-03-04 RX ADMIN — SODIUM CHLORIDE, POTASSIUM CHLORIDE, SODIUM LACTATE AND CALCIUM CHLORIDE: 600; 310; 30; 20 INJECTION, SOLUTION INTRAVENOUS at 23:15

## 2019-03-04 RX ADMIN — QUETIAPINE 50 MG: 25 TABLET ORAL at 11:18

## 2019-03-04 RX ADMIN — QUETIAPINE 50 MG: 25 TABLET ORAL at 20:10

## 2019-03-04 RX ADMIN — ACETAMINOPHEN 650 MG: 325 TABLET, FILM COATED ORAL at 05:43

## 2019-03-04 RX ADMIN — FENTANYL CITRATE 50 MCG: 50 INJECTION, SOLUTION INTRAMUSCULAR; INTRAVENOUS at 10:28

## 2019-03-04 RX ADMIN — ONDANSETRON 4 MG: 2 INJECTION INTRAMUSCULAR; INTRAVENOUS at 09:28

## 2019-03-04 RX ADMIN — NICOTINE 1 PATCH: 21 PATCH, EXTENDED RELEASE TRANSDERMAL at 00:53

## 2019-03-04 RX ADMIN — FENTANYL CITRATE 50 MCG: 50 INJECTION, SOLUTION INTRAMUSCULAR; INTRAVENOUS at 09:08

## 2019-03-04 RX ADMIN — IBUPROFEN 600 MG: 600 TABLET ORAL at 11:18

## 2019-03-04 RX ADMIN — DEXMEDETOMIDINE HYDROCHLORIDE 8 MCG: 100 INJECTION, SOLUTION INTRAVENOUS at 09:28

## 2019-03-04 RX ADMIN — CLINDAMYCIN HYDROCHLORIDE 300 MG: 300 CAPSULE ORAL at 08:17

## 2019-03-04 RX ADMIN — DEXMEDETOMIDINE HYDROCHLORIDE 12 MCG: 100 INJECTION, SOLUTION INTRAVENOUS at 09:20

## 2019-03-04 RX ADMIN — FENTANYL CITRATE 50 MCG: 50 INJECTION, SOLUTION INTRAMUSCULAR; INTRAVENOUS at 09:12

## 2019-03-04 ASSESSMENT — ACTIVITIES OF DAILY LIVING (ADL)
ADLS_ACUITY_SCORE: 10

## 2019-03-04 ASSESSMENT — ENCOUNTER SYMPTOMS: DYSRHYTHMIAS: 0

## 2019-03-04 NOTE — BRIEF OP NOTE
[facility]    Brief Operative Note    Pre-operative diagnosis: Right forearm abscess   Post-operative diagnosis Right forearm abscess   Procedure: Procedure(s):  IRRIGATION AND DEBRIDEMENT RIGHT FOREARM ABSCESS  Surgeon: Surgeon(s) and Role:     * Gideon Black MD - Primary  Anesthesia: Combined MAC with Local   Estimated blood loss: 2 mL  Drains: None  Specimens:   ID Type Source Tests Collected by Time Destination   1 : right forearm abcess Abscess Arm, Forearm Only, Right ABSCESS CULTURE AEROBIC BACTERIAL, ANAEROBIC BACTERIAL CULTURE, GRAM STAIN Gideon Black MD 3/4/2019  9:44 AM      Findings:   as expected.  Complications: None.  Implants: None.

## 2019-03-04 NOTE — PLAN OF CARE
A&O x 4.  VSS, RA.  CMS intact.  Swelling on right arm unchanged, RUFINA.  Pain managed with ibuprofen and tylenol.  CIWA score 0 and 1.  Seroquel for anxiety.  Up ind in room.  NPO after midnight, I&D tomorrow.

## 2019-03-04 NOTE — ANESTHESIA PREPROCEDURE EVALUATION
Anesthesia Pre-Procedure Evaluation    Patient: Giselle Mackenzie   MRN: [id number] : 1985          Preoperative Diagnosis: UNKNOWN    Procedure(s):  IRRIGATION AND DEBRIDEMENT RIGHT FOREARM ABSCESS    Patient is a 34yo female with history of IV opioid abuse (heroin) who presents with right arm abscess for I&D.  Patient last used heroin approx 7 days ago (per patient). She underwent short 3 day methadone taper.    Past Medical History:   Diagnosis Date     ADD (attention deficit disorder)      Asthma      Bipolar disorder, unspecified (H)      Cellulitis      Cervical high risk HPV (human papillomavirus) test positive 2017: NIL pap, + HR HPV (not 16 or 18) result.      GERD (gastroesophageal reflux disease)      H/O ETOH abuse      H/O intravenous drug use in remission      OCD (obsessive compulsive disorder)      PTSD (post-traumatic stress disorder)      Past Surgical History:   Procedure Laterality Date     HAND SURGERY Right     MRSA abscess drainage     HC TOOTH EXTRACTION W/FORCEP         Anesthesia Evaluation     .             ROS/MED HX    ENT/Pulmonary:     (+)asthma , . .    Neurologic:       Cardiovascular:        (-) hypertension, arrhythmias and irregular heartbeat/palpitations   METS/Exercise Tolerance:     Hematologic:        (-) anemia   Musculoskeletal:         GI/Hepatic:     (+) GERD      (-) liver disease   Renal/Genitourinary:      (-) renal disease   Endo:      (-) Type II DM and thyroid disease   Psychiatric: Comment: Substance use disorder, IV heroin    (+) psychiatric history bipolar, anxiety and depression      Infectious Disease:         Malignancy:         Other:                          Physical Exam      Airway   Mallampati: II  TM distance: >3 FB  Neck ROM: full    Dental     Cardiovascular   Rhythm and rate: regular      Pulmonary    breath sounds clear to auscultation            Lab Results   Component Value Date    WBC 10.6 2019    HGB 14.4  "03/04/2019    HCT 42.0 03/04/2019     03/04/2019    CRP 21.3 (H) 02/27/2019    SED 10 02/27/2019     03/04/2019    POTASSIUM 3.9 03/04/2019    CHLORIDE 108 03/04/2019    CO2 23 03/04/2019    BUN 12 03/04/2019    CR 0.84 03/04/2019    GLC 88 03/04/2019    BRYANT 8.5 03/04/2019    ALBUMIN 3.7 03/05/2018    PROTTOTAL 7.5 03/05/2018    ALT 56 (H) 03/05/2018    AST 32 03/05/2018    ALKPHOS 122 03/05/2018    BILITOTAL 0.2 03/05/2018    LIPASE 54 08/12/2013    INR 0.86 03/05/2018    TSH 1.90 01/08/2018    HCG  01/16/2016     Negative  THIS IS NOT THE ADD ON THAT WAS REQUESTED VERBALLY ON PHONE URINE HCGS CAN HAVE   FALSELY POS RESULTS IF ADDED ON AFTER URINE ANALYSIS ON THE LAB INSTRUMENT THIS   RESULT IS ONLY ABLE TO BE ADDED SINCE IT CAME UP NEGATIVE      HCGS Negative 03/04/2019       Preop Vitals  BP Readings from Last 3 Encounters:   03/04/19 (!) 89/79   03/19/18 137/88   03/05/18 133/77    Pulse Readings from Last 3 Encounters:   03/04/19 72   03/05/18 104   01/08/18 101      Resp Readings from Last 3 Encounters:   03/04/19 16   03/19/18 16   10/25/17 16    SpO2 Readings from Last 3 Encounters:   03/04/19 97%   03/19/18 98%   03/05/18 92%      Temp Readings from Last 1 Encounters:   03/04/19 36.6  C (97.8  F) (Axillary)    Ht Readings from Last 1 Encounters:   02/26/19 1.6 m (5' 3\")      Wt Readings from Last 1 Encounters:   02/26/19 69.5 kg (153 lb 3.2 oz)    Estimated body mass index is 27.14 kg/m  as calculated from the following:    Height as of this encounter: 1.6 m (5' 3\").    Weight as of this encounter: 69.5 kg (153 lb 3.2 oz).       Anesthesia Plan      History & Physical Review  History and physical reviewed and following examination; no interval change.    ASA Status:  2 .    NPO Status:  > 8 hours    Plan for MAC Reason for MAC:  Deep or markedly invasive procedure (G8)  PONV prophylaxis:  Ondansetron (or other 5HT-3)  Opioid sparing  Propofol infusion  precedex  Tylenol preop      Postoperative " Care  Postoperative pain management:  Multi-modal analgesia.      Consents  Anesthetic plan, risks, benefits and alternatives discussed with:  Patient..                 Roel Guevara MD

## 2019-03-04 NOTE — PROVIDER NOTIFICATION
MD Notification    Notified Person: MD    Notified Person Name: Slime    Notification Date/Time: 0022 3/4/19    Notification Interaction: paging service      Purpose of Notification: pt requesting nicotine patch    Orders Received: transdermal nicotine patch

## 2019-03-04 NOTE — PLAN OF CARE
Pt arrived from PACU at 1100. A/O. IND. Ibuprofen for pain. IVF. CIWA 0. Nicotine patch LUE. Oral ABX. cultures pending. Continue to monitor.

## 2019-03-04 NOTE — PROGRESS NOTES
[facility]    Hospitalist Progress Note      Assessment & Plan   Giselle Mackenzie is a 33 year old female who was admitted on 2/26/2019 for right forearm cellulitis, complicated by opioid withdrawal.    Purulent cellulitis of RUE  Abscess x2 right upper extremity extended into muscle  History MRSA  Presented with RUE redness with purulent drainage. In the ED, she was given a dose of IV vancomycin and continued on oral clindamycin. Hx MRSA--on contact precautions  - Exam worse on 3/3/19, had been on clindamycin QID--continue for now (from WBC and clinical stand point she has been stable)  - 2/27 blood cultures x2 NGTD  - Appreciate gen surg, patient taken to OR 3/4 AM for I&D of right forearm abscess x2, cultures collected.  - Wound packed with bactracin and iodoform  - Follow up wound cultures.     Acute opioid withdrawal  IV drug abuse  Active heroin use. Last used 2 days PTA. Developed s/sx of withdrawal on 2/28. Patient with longstanding and ongoing history of polysubstance abuse with multiple treatment stays, including commitment in Oct 2017.   - Psychiatry consulted, appreciate assistance  - Psychiatry recommended 3 dose methadone taper (30, 20, 10). Received 20 mg 2/28, 20 mg 3/1 and 10 mg 3/2 (last dose)  - SW involved, patient does not have a commitment status. Plans to return to hotel with her friends on discharge  - Diazepam and quetiapine available PRN for anxiety, restlessness, and agitation  - Supportive cares: antiemetics prn, loperamide prn    DVT Prophylaxis: Low Risk/Ambulatory with no VTE prophylaxis indicated  Code Status: Full Code  Expected discharge: 24-48 hours, recommended to prior living arrangement pending continued improvement from right forearm abscess    Sia Babb, DO  Text Page (7am - 6pm)    Interval History   Patient seen and examined. Returned from OR this morning. The area on her right forearm started to have some drainage overnight which was malodorous and  the area got more swollen. After the OR she states that the area is sore, but pain is much less due to decreased swelling. Denies shortness of breath, chest pain, abdominal pain. Still feels like she is withdrawing from opioids.    -Data reviewed today: I reviewed all new labs and imaging results over the last 24 hours. I personally reviewed no new imaging or EKGs    Physical Exam   Temp: 96.8  F (36  C) Temp src: Temporal BP: 96/45 Pulse: 86 Heart Rate: 77 Resp: 16 SpO2: 97 % O2 Device: None (Room air) Oxygen Delivery: 8 LPM  Vitals:    02/26/19 2316   Weight: 69.5 kg (153 lb 3.2 oz)     Vital Signs with Ranges  Temp:  [96.8  F (36  C)-98.8  F (37.1  C)] 96.8  F (36  C)  Pulse:  [] 86  Heart Rate:  [64-77] 77  Resp:  [9-16] 16  BP: ()/(33-94) 96/45  SpO2:  [93 %-100 %] 97 %  I/O last 3 completed shifts:  In: 550 [P.O.:550]  Out: -     Constitutional: Awakens easily, cooperative, no apparent distress  Respiratory: Clear to auscultation bilaterally, no crackles or wheezing  Cardiovascular: Regular rate and rhythm, normal S1 and S2, and no murmur noted  GI: Normal bowel sounds, soft, non-distended, non-tender  Skin/Integumen: right forearm wrapped with bandage, no strike through.  Other:     Medications     lactated ringers 25 mL/hr at 03/04/19 1109       clindamycin  300 mg Oral 4x Daily     nicotine  1 patch Transdermal Daily     nicotine   Transdermal Q8H     [START ON 3/5/2019] nicotine   Transdermal Daily       Data   Recent Labs   Lab 03/04/19  0714 03/01/19  0656 02/27/19  0023   WBC 10.6 9.0 10.3   HGB 14.4 15.4 14.4   MCV 85 84 83    363 364    141 140   POTASSIUM 3.9 3.9 3.6   CHLORIDE 108 113* 107   CO2 23 23 26   BUN 12 11 8   CR 0.84 0.89 0.82   ANIONGAP 8 5 7   BRYANT 8.5 8.9 8.6   GLC 88 78 99       No results found for this or any previous visit (from the past 24 hour(s)).

## 2019-03-04 NOTE — OR NURSING
Patient Giselle Mackenzie is in stable condition and has met criteria for PACU discharge.  RACHEAL Guevara was  informed and a sign out was given for Unit/Station transfer.

## 2019-03-04 NOTE — OP NOTE
General Surgery Operative Note    PREOPERATIVE DIAGNOSIS: Right forearm abscess    POSTOPERATIVE DIAGNOSIS:  Right forearm abscess x2    PROCEDURE:   Procedure(s):  IRRIGATION AND DEBRIDEMENT RIGHT FOREARM ABSCESS    ANESTHESIA: IV sedation and local    PREOPERATIVE MEDICATIONS: Ancef    SURGEON:  Gideon Black MD    ASSISTANT:  Ruth Nair PA-C.  Assistant was required owing to challenging exposure and need for retraction.    INDICATIONS: Patient is an IV drug user who was recently admitted to the hospital.  She was noted to have cellulitis and developed an abscess in the right forearm.  I was unable to manipulate this at the bedside and she now presents for incision and drainage    PROCEDURE:  The patient was taken to the operating suite and was given IV sedation.  The right forearm was prepped and draped in a sterile fashion.  I anesthetized an area over the largest of the abscesses and incised out an area of skin about the size of a quarter.  There was a significant amount of purulent discharge as well as devitalized tissue.  This was debrided down to the level of the underlying muscle.  Cultures were sent.  The wound was packed with bacitracin and iodoform..  A second, smaller skin lesion was noted on the more dorsal aspect of the forearm.  This again was debrided after injection of local.  Both wounds were dressed.  At the conclusion of the case, all lap and needle counts were correct.      ESTIMATED BLOOD LOSS: 2 mL    INTRAOPERATIVE FINDINGS: Right forearm abscess x2, extended through subcutaneous tissue down to the level of the muscle.  Largest wound measured 2.5 x 2.5 x 1.5 cm    Gideon Black MD

## 2019-03-04 NOTE — OR NURSING
Telephone report was given to Station 55 RN.  Patient will be transported to Room. 10-2 via cart accompanied by MARITA.

## 2019-03-05 VITALS
OXYGEN SATURATION: 100 % | DIASTOLIC BLOOD PRESSURE: 72 MMHG | HEIGHT: 63 IN | BODY MASS INDEX: 27.14 KG/M2 | RESPIRATION RATE: 16 BRPM | WEIGHT: 153.2 LBS | SYSTOLIC BLOOD PRESSURE: 112 MMHG | HEART RATE: 83 BPM | TEMPERATURE: 98.2 F

## 2019-03-05 LAB
ANION GAP SERPL CALCULATED.3IONS-SCNC: NORMAL MMOL/L (ref 3–14)
BACTERIA SPEC CULT: NO GROWTH
BACTERIA SPEC CULT: NO GROWTH
BUN SERPL-MCNC: NORMAL MG/DL (ref 7–30)
CALCIUM SERPL-MCNC: NORMAL MG/DL (ref 8.5–10.1)
CHLORIDE SERPL-SCNC: NORMAL MMOL/L (ref 94–109)
CO2 SERPL-SCNC: NORMAL MMOL/L (ref 20–32)
CREAT SERPL-MCNC: NORMAL MG/DL (ref 0.52–1.04)
ERYTHROCYTE [DISTWIDTH] IN BLOOD BY AUTOMATED COUNT: NORMAL % (ref 10–15)
GFR SERPL CREATININE-BSD FRML MDRD: >90 ML/MIN/{1.73_M2}
GLUCOSE SERPL-MCNC: NORMAL MG/DL (ref 70–99)
HCT VFR BLD AUTO: NORMAL % (ref 35–47)
HGB BLD-MCNC: NORMAL G/DL (ref 11.7–15.7)
Lab: NORMAL
Lab: NORMAL
MCH RBC QN AUTO: NORMAL PG (ref 26.5–33)
MCHC RBC AUTO-ENTMCNC: NORMAL G/DL (ref 31.5–36.5)
MCV RBC AUTO: NORMAL FL (ref 78–100)
PLATELET # BLD AUTO: NORMAL 10E9/L (ref 150–450)
POTASSIUM SERPL-SCNC: NORMAL MMOL/L (ref 3.4–5.3)
RBC # BLD AUTO: NORMAL 10E12/L (ref 3.8–5.2)
SODIUM SERPL-SCNC: NORMAL MMOL/L (ref 133–144)
SPECIMEN SOURCE: NORMAL
SPECIMEN SOURCE: NORMAL
WBC # BLD AUTO: 7.8 10E9/L (ref 4–11)

## 2019-03-05 PROCEDURE — 25000132 ZZH RX MED GY IP 250 OP 250 PS 637: Performed by: HOSPITALIST

## 2019-03-05 PROCEDURE — 25000132 ZZH RX MED GY IP 250 OP 250 PS 637: Performed by: INTERNAL MEDICINE

## 2019-03-05 PROCEDURE — 25000125 ZZHC RX 250: Performed by: PHYSICIAN ASSISTANT

## 2019-03-05 PROCEDURE — 85027 COMPLETE CBC AUTOMATED: CPT | Performed by: HOSPITALIST

## 2019-03-05 PROCEDURE — 99239 HOSP IP/OBS DSCHRG MGMT >30: CPT | Performed by: HOSPITALIST

## 2019-03-05 PROCEDURE — 80048 BASIC METABOLIC PNL TOTAL CA: CPT | Performed by: HOSPITALIST

## 2019-03-05 PROCEDURE — 25000132 ZZH RX MED GY IP 250 OP 250 PS 637: Performed by: PSYCHIATRY & NEUROLOGY

## 2019-03-05 PROCEDURE — 36415 COLL VENOUS BLD VENIPUNCTURE: CPT | Performed by: HOSPITALIST

## 2019-03-05 RX ORDER — ALPRAZOLAM 0.25 MG
0.25 TABLET ORAL ONCE
Status: COMPLETED | OUTPATIENT
Start: 2019-03-05 | End: 2019-03-05

## 2019-03-05 RX ORDER — CLINDAMYCIN HCL 300 MG
300 CAPSULE ORAL 4 TIMES DAILY
Qty: 28 CAPSULE | Refills: 0 | Status: SHIPPED | OUTPATIENT
Start: 2019-03-05 | End: 2019-03-12

## 2019-03-05 RX ORDER — CALCIUM CARBONATE 500 MG/1
1000 TABLET, CHEWABLE ORAL
Status: DISCONTINUED | OUTPATIENT
Start: 2019-03-05 | End: 2019-03-05 | Stop reason: HOSPADM

## 2019-03-05 RX ORDER — GINSENG 100 MG
CAPSULE ORAL 2 TIMES DAILY
Qty: 14 G | Refills: 0 | Status: SHIPPED | OUTPATIENT
Start: 2019-03-05 | End: 2020-10-04

## 2019-03-05 RX ORDER — NICOTINE 21 MG/24HR
1 PATCH, TRANSDERMAL 24 HOURS TRANSDERMAL DAILY
Status: DISCONTINUED | OUTPATIENT
Start: 2019-03-05 | End: 2019-03-05 | Stop reason: HOSPADM

## 2019-03-05 RX ORDER — GINSENG 100 MG
CAPSULE ORAL 2 TIMES DAILY
Status: DISCONTINUED | OUTPATIENT
Start: 2019-03-05 | End: 2019-03-05 | Stop reason: HOSPADM

## 2019-03-05 RX ADMIN — NICOTINE 1 PATCH: 21 PATCH, EXTENDED RELEASE TRANSDERMAL at 08:57

## 2019-03-05 RX ADMIN — CALCIUM CARBONATE (ANTACID) CHEW TAB 500 MG 1000 MG: 500 CHEW TAB at 11:53

## 2019-03-05 RX ADMIN — NICOTINE 1 PATCH: 21 PATCH, EXTENDED RELEASE TRANSDERMAL at 01:35

## 2019-03-05 RX ADMIN — CLINDAMYCIN HYDROCHLORIDE 300 MG: 300 CAPSULE ORAL at 11:27

## 2019-03-05 RX ADMIN — QUETIAPINE 50 MG: 25 TABLET ORAL at 14:13

## 2019-03-05 RX ADMIN — BACITRACIN: 500 OINTMENT TOPICAL at 11:53

## 2019-03-05 RX ADMIN — ALPRAZOLAM 0.25 MG: 0.25 TABLET ORAL at 10:30

## 2019-03-05 RX ADMIN — QUETIAPINE 50 MG: 25 TABLET ORAL at 02:05

## 2019-03-05 RX ADMIN — QUETIAPINE 50 MG: 25 TABLET ORAL at 07:51

## 2019-03-05 RX ADMIN — CLINDAMYCIN HYDROCHLORIDE 300 MG: 300 CAPSULE ORAL at 07:51

## 2019-03-05 ASSESSMENT — ACTIVITIES OF DAILY LIVING (ADL)
ADLS_ACUITY_SCORE: 10

## 2019-03-05 NOTE — DISCHARGE SUMMARY
[facility]    Discharge Summary  Hospitalist    Date of Admission:  2/26/2019  Date of Discharge:  3/5/2019  Discharging Provider: Sia Babb DO  Date of Service (when I saw the patient): 03/05/19    Discharge Diagnoses   Purulent cellulitis of RUE  Abscess x2 right upper extremity extended into muscle  Acute opioid withdrawal  IV drug abuse    History of Present Illness   Giselle Mackenzie is an 33 year old female who presented with right forearm cellulitis, which progressed to deep abscess and required irrigation and debridement in the operating room. Her hospitalization was complicated by opioid withdrawal.    Hospital Course   Giselle Mackenzie was admitted on 2/26/2019.  The following problems were addressed during her hospitalization:    Purulent cellulitis of RUE  Abscess x2 right upper extremity extended into muscle s/p I&D  Presented with RUE redness with purulent drainage. In the ED, she was given a dose of IV vancomycin and continued on oral clindamycin. Hx MRSA--on contact precautions. Exam worse on 3/3/19 with large area of abscess with central fluctuance. General surgery was consulted and she was taken to operating room on 3/4 AM for I&D for 2 forearm abscesses that extended into the muscle. Cultures obtained and growing moderate amounts of beta hemolytic strep. Blood cultures were negative.  - She will complete a 14 day course of clindamycin (discharged with 7 more days of antibiotics)  - She will follow up with PCP within 1 week  - She will perform daily dressing changes with bacitracin, gauze and keep the wound covered.     Acute opioid withdrawal  IV drug abuse  Active heroin use. Last used 2 days PTA. Developed s/sx of withdrawal on 2/28. Patient with longstanding and ongoing history of polysubstance abuse with multiple treatment stays, including commitment in Oct 2017.   - Psychiatry consulted, recommended 3 dose methadone taper (30, 20, 10). Received 20 mg 2/28, 20 mg  3/1 and 10 mg 3/2 (last dose)  - SW involved, patient does not have a commitment status. Plans to return to hotel with her friends on discharge  - She was prescribed prescription for Seroquel PRN for continued agitation/anxiety on discharge.    ADDENDUM: Giselle left the hospital with her dressing supplies before the nurse was able to complete her discharge paperwork. Giselle did not leave with the remaining course of antibiotics OR seroquel as prescribed above.    Sia Babb,     Significant Results and Procedures   3/4: I&D right forearm    Pending Results   These results will be followed up by PCP  Unresulted Labs Ordered in the Past 30 Days of this Admission     Date and Time Order Name Status Description    3/4/2019 0945 Anaerobic bacterial culture Preliminary     3/4/2019 0945 Abscess Culture Aerobic Bacterial Preliminary           Code Status   Full Code       Primary Care Physician   Mary Carter    Physical Exam   Temp: 98.2  F (36.8  C) Temp src: Oral BP: 112/72 Pulse: 83 Heart Rate: 94 Resp: 16 SpO2: 100 % O2 Device: None (Room air)    Vitals:    02/26/19 2316   Weight: 69.5 kg (153 lb 3.2 oz)     Vital Signs with Ranges  Temp:  [98  F (36.7  C)-98.9  F (37.2  C)] 98.2  F (36.8  C)  Pulse:  [72-83] 83  Heart Rate:  [83-94] 94  Resp:  [14-16] 16  BP: ()/(48-74) 112/72  SpO2:  [97 %-100 %] 100 %  I/O last 3 completed shifts:  In: 800 [P.O.:300; I.V.:500]  Out: 5 [Blood:5]    Constitutional: Awake, alert, cooperative, no apparent distress.  Eyes: Conjunctiva and pupils examined and normal.  HEENT: Moist mucous membranes, normal dentition.  Respiratory: Clear to auscultation bilaterally, no crackles or wheezing.  Cardiovascular: Regular rate and rhythm, normal S1 and S2, and no murmur noted.  GI: Soft, non-distended, non-tender, normal bowel sounds.  Skin: right forearm with dressing slid down, open area with pink tissue, no drainage. No surrounding erythema.  Musculoskeletal: No joint swelling,  erythema or tenderness.  Neurologic: Cranial nerves 2-12 intact, normal strength and sensation.  Psychiatric: Alert, oriented to person, place and time, no obvious anxiety or depression.    Discharge Disposition   Discharged to home  Condition at discharge: Stable    Consultations This Hospital Stay   PHARMACY TO DOSE VANCO  SOCIAL WORK IP CONSULT  PSYCHIATRY IP CONSULT  PSYCHIATRY IP CONSULT  SURGERY GENERAL IP CONSULT    Time Spent on this Encounter   Sia ANDREWS, personally saw the patient today and spent greater than 30 minutes discharging this patient.    Discharge Orders      Reason for your hospital stay    Arm cellulitis, opioid withdrawal     Follow-up and recommended labs and tests     Follow up with primary care provider, Mary Carter, within 7 days for hospital follow- up.  No follow up labs or test are needed.     Activity    Your activity upon discharge: activity as tolerated     Wound care and dressings    Change dressing daily. Apply bacitracin to area and keep covered. Follow up with your PCP within 7 days for wound check.     Full Code     Diet    Follow this diet upon discharge: Regular Diet Adult     Discharge Medications   Current Discharge Medication List      START taking these medications    Details   bacitracin 500 UNIT/GM OINT Apply topically 2 times daily  Qty: 14 g, Refills: 0    Associated Diagnoses: Cellulitis of right upper extremity      clindamycin (CLEOCIN) 300 MG capsule Take 1 capsule (300 mg) by mouth 4 times daily for 7 days  Qty: 28 capsule, Refills: 0    Associated Diagnoses: Cellulitis of right upper extremity      QUEtiapine (SEROQUEL) 50 MG tablet Take 1 tablet (50 mg) by mouth every 6 hours as needed (Moderate agitation)  Qty: 8 tablet, Refills: 0    Associated Diagnoses: Opioid withdrawal (H)         STOP taking these medications       methadone (DOLOPHINE-INTENSOL) 10 MG/ML (HIGH CONC) solution Comments:   Reason for Stopping:             Allergies   Allergies    Allergen Reactions     Sulfa Drugs Anaphylaxis     Latex Hives     Nickel Hives     Suboxone      fainting     Data   Most Recent 3 CBC's:  Recent Labs   Lab Test 03/05/19  0623 03/04/19  0714 03/01/19  0656   WBC 7.8 10.6 9.0   HGB Drawn above stopped IV 14.4 15.4   MCV Drawn above stopped IV 85 84   PLT Drawn above stopped  363      Most Recent 3 BMP's:  Recent Labs   Lab Test 03/05/19  0623 03/04/19  0714 03/01/19  0656   NA Drawn above stopped  141   POTASSIUM Drawn above stopped IV 3.9 3.9   CHLORIDE Drawn above stopped  113*   CO2 Drawn above stopped IV 23 23   BUN Drawn above stopped IV 12 11   CR Drawn above stopped IV 0.84 0.89   ANIONGAP Not Calculated 8 5   BRYANT Drawn above stopped IV 8.5 8.9   GLC Drawn above stopped IV 88 78     Most Recent 2 LFT's:  Recent Labs   Lab Test 03/05/18  1044 09/09/13  1450   AST 32 17   ALT 56* 26   ALKPHOS 122 70   BILITOTAL 0.2 <0.1*     Most Recent INR's and Anticoagulation Dosing History:  Anticoagulation Dose History     Recent Dosing and Labs Latest Ref Rng & Units 3/5/2018    INR 0.86 - 1.14 0.86        Most Recent 3 Troponin's:No lab results found.  Most Recent Cholesterol Panel:No lab results found.  Most Recent 6 Bacteria Isolates From Any Culture (See EPIC Reports for Culture Details):  Recent Labs   Lab Test 03/04/19  0944 02/27/19  0103 02/27/19  0023 10/25/17  1000 09/22/17  1539 05/25/17  1338   CULT Moderate growth  beta hemolytic   Streptococcus constellatus  *  Culture in progress  Culture negative monitoring continues No growth No growth No Beta Streptococcus isolated >100,000 colonies/mL  Escherichia coli  * >100,000 colonies/mL Escherichia coli  <10,000 colonies/mL mixed urogenital duc Susceptibility testing not routinely   done  *     Most Recent TSH, T4 and A1c Labs:  Recent Labs   Lab Test 01/08/18  1044 05/25/17  1248   TSH 1.90  --    A1C  --  5.3     Results for orders placed or performed during the hospital encounter of  02/26/19   POC US SOFT TISSUE    Impression    Limited Soft Tissue Ultrasound, performed and interpreted by me.    Indication:  Skin redness warmth pain. Evaluate for cellulitis vs abscess.     Body location: right upper extremity    Findings:  There is cobblestoning suggestive of cellulitis in the evaluated area. There is a fluid collection measuring 0.4x0.6cm to suggest abscess. No foreign body identified    IMPRESSION: Abscess and Cellulitis

## 2019-03-05 NOTE — PLAN OF CARE
A&Ox4, VSS on RA, hypotensive and asymptomatic. C/o pain but declined tylenol and ibuprofen. +CMS. Moderate hand strength. CIWA 0. Up IND. Dressing cdi. Tolerating reg diet. Voiding without difficulty.

## 2019-03-05 NOTE — PROGRESS NOTES
"BRIEF NUTRITION ASSESSMENT      REASON FOR ASSESSMENT:  Giselle Mackenzie is a 33 year old female seen by Registered Dietitian for LOS    NUTRITION HISTORY:  -Unable to complete full nutrition history at this time: pt resting, did not open eyes during visit. She states that she is very tired, she was unable to sleep most of the night  -Pt states that she follow a low sodium diet at home. With no known food allergies  -Pt reports no recent wt loss     -Hx of: GERD, ETOH abuse, intravenous drug use, cellulitis, Hep C, syphilis, bipolar, PTSD  -Per Chart review: \"The patient states she uses cocaine occasional but has not used any today. She generally shoots up around half a gram of heroin daily as she has been a heroin addict for 10 years.\"    CURRENT DIET AND INTAKE:  Diet:  Regular diet             Intake:  -Pt states that she has a good appetite and that she has been eating most of meals ordered. Pt mentions that she like the food here  -Per RN flowsheet, pt consuming 75%-100% of meals ordered  -BM x 1 (today recorded) Per chart review pt with loose stools - imodium given   -No wt history recorded this admission     Chart review:  -3/4: Irrigation and debridement, right forearm abscess     ANTHROPOMETRICS:  Height: 5' 3\"  Weight:  153 lbs 3.2 oz (69.5 kg)  Body mass index is 27.14 kg/m .   Weight Status: Overweight BMI 25-29.9  IBW:  52.3 kg   %IBW: 133%  Weight History: wt loss of 21.2 kg (23.4%) over the last year   Wt Readings from Last 10 Encounters:   02/26/19 69.5 kg (153 lb 3.2 oz)   03/05/18 90.7 kg (200 lb)   01/08/18 94.5 kg (208 lb 6.4 oz)   12/07/17 88.7 kg (195 lb 9.6 oz)   10/23/17 82.6 kg (182 lb)   10/17/17 83.9 kg (185 lb)   10/11/17 82.6 kg (182 lb)   10/04/17 81.6 kg (180 lb)   10/03/17 82.1 kg (181 lb)   09/22/17 80 kg (176 lb 4.8 oz)       LABS:  Labs noted    MALNUTRITION:  Patient does not meet two of the following criteria necessary for diagnosing malnutrition: significant weight loss, " reduced intake, subcutaneous fat loss, muscle loss or fluid retention    NUTRITION INTERVENTION:  Nutrition Diagnosis:  No nutrition diagnosis at this time.    Implementation:  Nutrition Education: provided education on oral nutrition supplements and role of RD    FOLLOW UP/MONITORING:   Will re-evaluate in 7 - 10 days, or sooner, if re-consulted.      Racquel Damian RD, LD

## 2019-03-05 NOTE — PROGRESS NOTES
Surgery    Pt lying in bed.   Reports pain improved in right forearm    B/P: 112/72, T: 98.2, P: 83, R: 16  Right forearm: dressing removed, packing removed.  Dry gauze placed over wounds and secured with Kerlix.     A/P: s/p right forearm abscess  - continue twice daily dressing changes  - Bacitracin not available at the time of dressing change - will have nursing apply and redo dressing  - call with questions      Ruth Nair PA-C

## 2019-03-05 NOTE — PLAN OF CARE
Pt remains A/O. IND. CIWA 0. Dressing changed and teaching given, pt feels comfortable to perform dressing changes on discharge-sent with supplies. General surgery okay to have pt change once daily. Denies pain. Xanax x1. PRN Seroquel x2. Plan to discharge this evening.

## 2019-03-05 NOTE — PLAN OF CARE
Pt A/O x4. CMS intact, moderate  in hands. Dressing is CDI. VSS on RA. Pt verbalized having three episodes of diarrhea yesterday. A hat has been added to her BR to monitor the stools at this time. No BM overnight. Taking Tylenol for pain. Seroquel requested x2. CIWA of 1 at times, reporting feeling slightly nauseous once, and anxious later in the night. Tolerating regular diet. Nicotine patch in place. LR running @ 25.

## 2019-03-05 NOTE — PROGRESS NOTES
Pt left without signing discharge paperwork and did not take her medications. Provider updated. Attempted to call pt on cell phone, no answer.

## 2019-03-06 LAB — INTERPRETATION ECG - MUSE: NORMAL

## 2019-03-08 ENCOUNTER — TELEPHONE (OUTPATIENT)
Dept: FAMILY MEDICINE | Facility: CLINIC | Age: 34
End: 2019-03-08

## 2019-03-08 LAB
BACTERIA SPEC CULT: ABNORMAL
Lab: ABNORMAL
SPECIMEN SOURCE: ABNORMAL

## 2019-03-08 NOTE — TELEPHONE ENCOUNTER
"LOV: 1/18/2018--    patient in ER from 2/6-3/5 for RUE cellulitis r/t IV heroin use.     Patient most recently receives care at [facility].     Per discharge note from Hospital on 3/5:    \"Giselle left the hospital with her dressing supplies before the nurse was able to complete her discharge paperwork. Giselle did not leave with the remaining course of antibiotics OR seroquel as prescribed above.\"      Attempt # 1  LVM on home phone and cell phone to call clinic back.    Triage to [facility] again on Monday 3/11    Thanks! Awa Farr RN           "

## 2019-03-10 LAB
BACTERIA SPEC CULT: ABNORMAL
Lab: ABNORMAL
SPECIMEN SOURCE: ABNORMAL

## 2019-03-12 NOTE — TELEPHONE ENCOUNTER
ED / Discharge Outreach Protocol    Patient Contact    Attempt # 2    Was call answered?  No.  Left message on voicemail with information to call me back.    Erica Schmidt, RN  Triage Nurse

## 2019-03-13 NOTE — TELEPHONE ENCOUNTER
ED / Discharge Outreach Protocol    Patient Contact    Attempt # 3    Was call answered?  No.  Left message on voicemail with information to call me back.    Liana Desouza RN  [facility]

## 2019-08-13 NOTE — ANESTHESIA CARE TRANSFER NOTE
Patient: Giselle Mackenzie    Procedure(s):  IRRIGATION AND DEBRIDEMENT RIGHT FOREARM ABSCESS    Diagnosis: UNKNOWN  Diagnosis Additional Information: No value filed.    Anesthesia Type:   MAC     Note:  Airway :Face Mask  Patient transferred to:PACU  Comments: Pt exhibits spont resps, all monitors and alarms on in pacu, report given to RN, vss.Handoff Report: Identifed the Patient, Identified the Reponsible Provider, Reviewed the pertinent medical history, Discussed the surgical course, Reviewed Intra-OP anesthesia mangement and issues during anesthesia, Set expectations for post-procedure period and Allowed opportunity for questions and acknowledgement of understanding      Vitals: (Last set prior to Anesthesia Care Transfer)    CRNA VITALS  3/4/2019 0932 - 3/4/2019 1009      3/4/2019             Resp Rate (set):  10                Electronically Signed By: JESSICA Machado CRNA  March 4, 2019  10:09 AM  
pt ambulatory in hallways with SBA

## 2019-08-29 NOTE — NURSING NOTE
Chief Complaint   Patient presents with     Prenatal Care       Initial /76  Pulse 111  Wt 135 lb (61.2 kg)  SpO2 99%  Breastfeeding? No There is no height or weight on file to calculate BMI.  BP completed using cuff size: regular        The following HM Due: pap smear      The following patient reported/Care Every where data was sent to:  P ABSTRACT QUALITY INITIATIVES [[id number]]  none     patient has appointment for today              History of MRI  w/sedation  2014 or 2015, no complications.  Port catheter in place  left chest wall, single lumen  22G x 3/4" [place]

## 2019-11-05 ENCOUNTER — HEALTH MAINTENANCE LETTER (OUTPATIENT)
Age: 34
End: 2019-11-05

## 2020-09-11 NOTE — PROGRESS NOTES
Please refer to ultrasound report under 'Imaging' Studies of 'Chart Review' tabs.    Faisal Min M.D.     oral

## 2020-10-04 ENCOUNTER — HOSPITAL ENCOUNTER (INPATIENT)
Facility: CLINIC | Age: 35
LOS: 6 days | Discharge: HOME | End: 2020-10-10
Attending: EMERGENCY MEDICINE | Admitting: INTERNAL MEDICINE
Payer: MEDICAID

## 2020-10-04 DIAGNOSIS — L03.113 CELLULITIS OF RIGHT UPPER LIMB: ICD-10-CM

## 2020-10-04 DIAGNOSIS — L02.519 ABSCESS OF HAND: ICD-10-CM

## 2020-10-04 DIAGNOSIS — L03.011 CELLULITIS OF RIGHT FINGER: ICD-10-CM

## 2020-10-04 DIAGNOSIS — L02.511 CUTANEOUS ABSCESS OF RIGHT HAND: ICD-10-CM

## 2020-10-04 DIAGNOSIS — L02.512 CUTANEOUS ABSCESS OF LEFT HAND: ICD-10-CM

## 2020-10-04 DIAGNOSIS — F41.9 ANXIETY: ICD-10-CM

## 2020-10-04 DIAGNOSIS — L03.119 CELLULITIS OF UPPER EXTREMITY, UNSPECIFIED LATERALITY: ICD-10-CM

## 2020-10-04 DIAGNOSIS — Z20.828 CONTACT WITH AND (SUSPECTED) EXPOSURE TO OTHER VIRAL COMMUNICABLE DISEASES: ICD-10-CM

## 2020-10-04 DIAGNOSIS — L03.012 CELLULITIS OF LEFT FINGER: ICD-10-CM

## 2020-10-04 DIAGNOSIS — R10.13 DYSPEPSIA: Primary | ICD-10-CM

## 2020-10-04 DIAGNOSIS — L03.114 CELLULITIS OF LEFT UPPER LIMB: ICD-10-CM

## 2020-10-04 LAB
ALBUMIN SERPL-MCNC: 3.4 G/DL (ref 3.4–5)
ALP SERPL-CCNC: 89 U/L (ref 40–150)
ALT SERPL W P-5'-P-CCNC: 79 U/L (ref 0–50)
AMPHETAMINES UR QL SCN: POSITIVE
ANION GAP SERPL CALCULATED.3IONS-SCNC: 8 MMOL/L (ref 3–14)
AST SERPL W P-5'-P-CCNC: 42 U/L (ref 0–45)
BARBITURATES UR QL: NEGATIVE
BASOPHILS # BLD AUTO: 0.1 10E9/L (ref 0–0.2)
BASOPHILS NFR BLD AUTO: 0.3 %
BENZODIAZ UR QL: NEGATIVE
BILIRUB SERPL-MCNC: 0.5 MG/DL (ref 0.2–1.3)
BUN SERPL-MCNC: 10 MG/DL (ref 7–30)
CALCIUM SERPL-MCNC: 8.4 MG/DL (ref 8.5–10.1)
CANNABINOIDS UR QL SCN: POSITIVE
CHLORIDE SERPL-SCNC: 102 MMOL/L (ref 94–109)
CO2 SERPL-SCNC: 25 MMOL/L (ref 20–32)
COCAINE UR QL: POSITIVE
CREAT SERPL-MCNC: 0.58 MG/DL (ref 0.52–1.04)
CREAT SERPL-MCNC: 0.77 MG/DL (ref 0.52–1.04)
CRP SERPL-MCNC: 150 MG/L (ref 0–8)
DIFFERENTIAL METHOD BLD: ABNORMAL
EOSINOPHIL # BLD AUTO: 0.3 10E9/L (ref 0–0.7)
EOSINOPHIL NFR BLD AUTO: 1.5 %
ERYTHROCYTE [DISTWIDTH] IN BLOOD BY AUTOMATED COUNT: 13.7 % (ref 10–15)
ERYTHROCYTE [SEDIMENTATION RATE] IN BLOOD BY WESTERGREN METHOD: 33 MM/H (ref 0–20)
ETHANOL UR QL SCN: NEGATIVE
GFR SERPL CREATININE-BSD FRML MDRD: >90 ML/MIN/{1.73_M2}
GFR SERPL CREATININE-BSD FRML MDRD: >90 ML/MIN/{1.73_M2}
GLUCOSE SERPL-MCNC: 79 MG/DL (ref 70–99)
GRAM STN SPEC: ABNORMAL
HCG UR QL: NEGATIVE
HCT VFR BLD AUTO: 40.1 % (ref 35–47)
HGB BLD-MCNC: 13.4 G/DL (ref 11.7–15.7)
IMM GRANULOCYTES # BLD: 0.1 10E9/L (ref 0–0.4)
IMM GRANULOCYTES NFR BLD: 0.4 %
LACTATE BLD-SCNC: 0.8 MMOL/L (ref 0.7–2)
LYMPHOCYTES # BLD AUTO: 2.9 10E9/L (ref 0.8–5.3)
LYMPHOCYTES NFR BLD AUTO: 16.4 %
Lab: ABNORMAL
MCH RBC QN AUTO: 29.1 PG (ref 26.5–33)
MCHC RBC AUTO-ENTMCNC: 33.4 G/DL (ref 31.5–36.5)
MCV RBC AUTO: 87 FL (ref 78–100)
MONOCYTES # BLD AUTO: 1.5 10E9/L (ref 0–1.3)
MONOCYTES NFR BLD AUTO: 8.4 %
NEUTROPHILS # BLD AUTO: 13 10E9/L (ref 1.6–8.3)
NEUTROPHILS NFR BLD AUTO: 73 %
NRBC # BLD AUTO: 0 10*3/UL
NRBC BLD AUTO-RTO: 0 /100
OPIATES UR QL SCN: POSITIVE
PLATELET # BLD AUTO: 419 10E9/L (ref 150–450)
POTASSIUM SERPL-SCNC: 4.8 MMOL/L (ref 3.4–5.3)
PROCALCITONIN SERPL-MCNC: 0.1 NG/ML
PROT SERPL-MCNC: 7.7 G/DL (ref 6.8–8.8)
RBC # BLD AUTO: 4.6 10E12/L (ref 3.8–5.2)
SODIUM SERPL-SCNC: 135 MMOL/L (ref 133–144)
SPECIMEN SOURCE: ABNORMAL
WBC # BLD AUTO: 17.9 10E9/L (ref 4–11)

## 2020-10-04 PROCEDURE — 87205 SMEAR GRAM STAIN: CPT | Performed by: EMERGENCY MEDICINE

## 2020-10-04 PROCEDURE — 0H9GXZZ DRAINAGE OF LEFT HAND SKIN, EXTERNAL APPROACH: ICD-10-PCS | Performed by: EMERGENCY MEDICINE

## 2020-10-04 PROCEDURE — 84145 PROCALCITONIN (PCT): CPT | Performed by: EMERGENCY MEDICINE

## 2020-10-04 PROCEDURE — 99207 PR CDG-EXAM COMPONENT: MEETS COMPREHENSIVE - UP CODED: CPT | Performed by: INTERNAL MEDICINE

## 2020-10-04 PROCEDURE — 80320 DRUG SCREEN QUANTALCOHOLS: CPT | Performed by: EMERGENCY MEDICINE

## 2020-10-04 PROCEDURE — C9803 HOPD COVID-19 SPEC COLLECT: HCPCS

## 2020-10-04 PROCEDURE — 87147 CULTURE TYPE IMMUNOLOGIC: CPT | Performed by: EMERGENCY MEDICINE

## 2020-10-04 PROCEDURE — 250N000013 HC RX MED GY IP 250 OP 250 PS 637: Performed by: INTERNAL MEDICINE

## 2020-10-04 PROCEDURE — 10061 I&D ABSCESS COMP/MULTIPLE: CPT | Performed by: EMERGENCY MEDICINE

## 2020-10-04 PROCEDURE — 96375 TX/PRO/DX INJ NEW DRUG ADDON: CPT | Performed by: EMERGENCY MEDICINE

## 2020-10-04 PROCEDURE — 99285 EMERGENCY DEPT VISIT HI MDM: CPT | Mod: 25 | Performed by: EMERGENCY MEDICINE

## 2020-10-04 PROCEDURE — 96372 THER/PROPH/DIAG INJ SC/IM: CPT | Performed by: EMERGENCY MEDICINE

## 2020-10-04 PROCEDURE — 96368 THER/DIAG CONCURRENT INF: CPT | Performed by: EMERGENCY MEDICINE

## 2020-10-04 PROCEDURE — 80053 COMPREHEN METABOLIC PANEL: CPT | Performed by: EMERGENCY MEDICINE

## 2020-10-04 PROCEDURE — 87070 CULTURE OTHR SPECIMN AEROBIC: CPT | Performed by: EMERGENCY MEDICINE

## 2020-10-04 PROCEDURE — 258N000001 HC RX 258: Performed by: INTERNAL MEDICINE

## 2020-10-04 PROCEDURE — 80307 DRUG TEST PRSMV CHEM ANLYZR: CPT | Performed by: EMERGENCY MEDICINE

## 2020-10-04 PROCEDURE — 0H9FXZZ DRAINAGE OF RIGHT HAND SKIN, EXTERNAL APPROACH: ICD-10-PCS | Performed by: EMERGENCY MEDICINE

## 2020-10-04 PROCEDURE — 82565 ASSAY OF CREATININE: CPT | Performed by: INTERNAL MEDICINE

## 2020-10-04 PROCEDURE — 96372 THER/PROPH/DIAG INJ SC/IM: CPT | Performed by: INTERNAL MEDICINE

## 2020-10-04 PROCEDURE — 250N000011 HC RX IP 250 OP 636: Performed by: INTERNAL MEDICINE

## 2020-10-04 PROCEDURE — 81025 URINE PREGNANCY TEST: CPT | Performed by: EMERGENCY MEDICINE

## 2020-10-04 PROCEDURE — 36416 COLLJ CAPILLARY BLOOD SPEC: CPT | Performed by: INTERNAL MEDICINE

## 2020-10-04 PROCEDURE — 96366 THER/PROPH/DIAG IV INF ADDON: CPT | Performed by: EMERGENCY MEDICINE

## 2020-10-04 PROCEDURE — U0003 INFECTIOUS AGENT DETECTION BY NUCLEIC ACID (DNA OR RNA); SEVERE ACUTE RESPIRATORY SYNDROME CORONAVIRUS 2 (SARS-COV-2) (CORONAVIRUS DISEASE [COVID-19]), AMPLIFIED PROBE TECHNIQUE, MAKING USE OF HIGH THROUGHPUT TECHNOLOGIES AS DESCRIBED BY CMS-2020-01-R: HCPCS | Performed by: EMERGENCY MEDICINE

## 2020-10-04 PROCEDURE — 83605 ASSAY OF LACTIC ACID: CPT | Performed by: EMERGENCY MEDICINE

## 2020-10-04 PROCEDURE — 250N000011 HC RX IP 250 OP 636: Performed by: EMERGENCY MEDICINE

## 2020-10-04 PROCEDURE — 250N000013 HC RX MED GY IP 250 OP 250 PS 637: Performed by: EMERGENCY MEDICINE

## 2020-10-04 PROCEDURE — 86140 C-REACTIVE PROTEIN: CPT | Performed by: EMERGENCY MEDICINE

## 2020-10-04 PROCEDURE — 96365 THER/PROPH/DIAG IV INF INIT: CPT | Performed by: EMERGENCY MEDICINE

## 2020-10-04 PROCEDURE — 85652 RBC SED RATE AUTOMATED: CPT | Performed by: EMERGENCY MEDICINE

## 2020-10-04 PROCEDURE — 120N000002 HC R&B MED SURG/OB UMMC

## 2020-10-04 PROCEDURE — 87040 BLOOD CULTURE FOR BACTERIA: CPT | Performed by: EMERGENCY MEDICINE

## 2020-10-04 PROCEDURE — 99223 1ST HOSP IP/OBS HIGH 75: CPT | Mod: AI | Performed by: INTERNAL MEDICINE

## 2020-10-04 PROCEDURE — HZ2ZZZZ DETOXIFICATION SERVICES FOR SUBSTANCE ABUSE TREATMENT: ICD-10-PCS | Performed by: INTERNAL MEDICINE

## 2020-10-04 PROCEDURE — 96376 TX/PRO/DX INJ SAME DRUG ADON: CPT | Performed by: EMERGENCY MEDICINE

## 2020-10-04 PROCEDURE — 258N000003 HC RX IP 258 OP 636: Performed by: EMERGENCY MEDICINE

## 2020-10-04 PROCEDURE — 85025 COMPLETE CBC W/AUTO DIFF WBC: CPT | Performed by: EMERGENCY MEDICINE

## 2020-10-04 RX ORDER — LIDOCAINE 40 MG/G
CREAM TOPICAL
Status: DISCONTINUED | OUTPATIENT
Start: 2020-10-04 | End: 2020-10-10 | Stop reason: HOSPADM

## 2020-10-04 RX ORDER — LIDOCAINE HYDROCHLORIDE 10 MG/ML
2 INJECTION, SOLUTION INFILTRATION; PERINEURAL
Status: DISCONTINUED | OUTPATIENT
Start: 2020-10-04 | End: 2020-10-04

## 2020-10-04 RX ORDER — ONDANSETRON 2 MG/ML
4 INJECTION INTRAMUSCULAR; INTRAVENOUS EVERY 6 HOURS PRN
Status: DISCONTINUED | OUTPATIENT
Start: 2020-10-04 | End: 2020-10-10 | Stop reason: HOSPADM

## 2020-10-04 RX ORDER — NICOTINE 21 MG/24HR
1 PATCH, TRANSDERMAL 24 HOURS TRANSDERMAL ONCE
Status: COMPLETED | OUTPATIENT
Start: 2020-10-04 | End: 2020-10-05

## 2020-10-04 RX ORDER — CEFTRIAXONE 2 G/1
2 INJECTION, POWDER, FOR SOLUTION INTRAMUSCULAR; INTRAVENOUS ONCE
Status: DISCONTINUED | OUTPATIENT
Start: 2020-10-04 | End: 2020-10-04

## 2020-10-04 RX ORDER — ACETAMINOPHEN 325 MG/1
650 TABLET ORAL EVERY 4 HOURS PRN
Status: DISCONTINUED | OUTPATIENT
Start: 2020-10-04 | End: 2020-10-10 | Stop reason: HOSPADM

## 2020-10-04 RX ORDER — NALOXONE HYDROCHLORIDE 0.4 MG/ML
.1-.4 INJECTION, SOLUTION INTRAMUSCULAR; INTRAVENOUS; SUBCUTANEOUS
Status: DISCONTINUED | OUTPATIENT
Start: 2020-10-04 | End: 2020-10-10 | Stop reason: HOSPADM

## 2020-10-04 RX ORDER — CALCIUM CARBONATE 500 MG/1
500 TABLET, CHEWABLE ORAL 3 TIMES DAILY PRN
Status: DISCONTINUED | OUTPATIENT
Start: 2020-10-04 | End: 2020-10-10 | Stop reason: HOSPADM

## 2020-10-04 RX ORDER — MORPHINE SULFATE 4 MG/ML
4 INJECTION, SOLUTION INTRAMUSCULAR; INTRAVENOUS
Status: COMPLETED | OUTPATIENT
Start: 2020-10-04 | End: 2020-10-04

## 2020-10-04 RX ORDER — METHADONE HYDROCHLORIDE 5 MG/5ML
10 SOLUTION ORAL ONCE
Status: COMPLETED | OUTPATIENT
Start: 2020-10-06 | End: 2020-10-06

## 2020-10-04 RX ORDER — ALUMINA, MAGNESIA, AND SIMETHICONE 2400; 2400; 240 MG/30ML; MG/30ML; MG/30ML
30 SUSPENSION ORAL EVERY 4 HOURS PRN
Status: DISCONTINUED | OUTPATIENT
Start: 2020-10-04 | End: 2020-10-10 | Stop reason: HOSPADM

## 2020-10-04 RX ORDER — METHADONE HYDROCHLORIDE 5 MG/5ML
20 SOLUTION ORAL ONCE
Status: COMPLETED | OUTPATIENT
Start: 2020-10-05 | End: 2020-10-05

## 2020-10-04 RX ORDER — TRAZODONE HYDROCHLORIDE 50 MG/1
50 TABLET, FILM COATED ORAL
Status: DISCONTINUED | OUTPATIENT
Start: 2020-10-04 | End: 2020-10-10 | Stop reason: HOSPADM

## 2020-10-04 RX ORDER — KETOROLAC TROMETHAMINE 30 MG/ML
15 INJECTION, SOLUTION INTRAMUSCULAR; INTRAVENOUS EVERY 6 HOURS PRN
Status: DISPENSED | OUTPATIENT
Start: 2020-10-04 | End: 2020-10-07

## 2020-10-04 RX ORDER — MORPHINE SULFATE 4 MG/ML
4 INJECTION, SOLUTION INTRAMUSCULAR; INTRAVENOUS
Status: DISCONTINUED | OUTPATIENT
Start: 2020-10-04 | End: 2020-10-04

## 2020-10-04 RX ORDER — LIDOCAINE HYDROCHLORIDE 10 MG/ML
INJECTION, SOLUTION EPIDURAL; INFILTRATION; INTRACAUDAL; PERINEURAL
Status: DISCONTINUED
Start: 2020-10-04 | End: 2020-10-04 | Stop reason: HOSPADM

## 2020-10-04 RX ORDER — CEFTRIAXONE 1 G/1
1 INJECTION, POWDER, FOR SOLUTION INTRAMUSCULAR; INTRAVENOUS EVERY 24 HOURS
Status: DISCONTINUED | OUTPATIENT
Start: 2020-10-04 | End: 2020-10-05

## 2020-10-04 RX ORDER — ONDANSETRON 2 MG/ML
4 INJECTION INTRAMUSCULAR; INTRAVENOUS ONCE
Status: COMPLETED | OUTPATIENT
Start: 2020-10-04 | End: 2020-10-04

## 2020-10-04 RX ORDER — HYDROMORPHONE HYDROCHLORIDE 1 MG/ML
0.5 INJECTION, SOLUTION INTRAMUSCULAR; INTRAVENOUS; SUBCUTANEOUS
Status: COMPLETED | OUTPATIENT
Start: 2020-10-04 | End: 2020-10-04

## 2020-10-04 RX ORDER — SODIUM CHLORIDE 9 MG/ML
INJECTION, SOLUTION INTRAVENOUS CONTINUOUS
Status: DISCONTINUED | OUTPATIENT
Start: 2020-10-04 | End: 2020-10-04

## 2020-10-04 RX ORDER — METHADONE HYDROCHLORIDE 5 MG/5ML
30 SOLUTION ORAL
Status: COMPLETED | OUTPATIENT
Start: 2020-10-04 | End: 2020-10-04

## 2020-10-04 RX ORDER — DEXTROSE MONOHYDRATE, SODIUM CHLORIDE, AND POTASSIUM CHLORIDE 50; 1.49; 9 G/1000ML; G/1000ML; G/1000ML
INJECTION, SOLUTION INTRAVENOUS CONTINUOUS
Status: DISPENSED | OUTPATIENT
Start: 2020-10-04 | End: 2020-10-05

## 2020-10-04 RX ORDER — ONDANSETRON 4 MG/1
4 TABLET, ORALLY DISINTEGRATING ORAL EVERY 6 HOURS PRN
Status: DISCONTINUED | OUTPATIENT
Start: 2020-10-04 | End: 2020-10-10 | Stop reason: HOSPADM

## 2020-10-04 RX ORDER — POLYETHYLENE GLYCOL 3350 17 G
2 POWDER IN PACKET (EA) ORAL
Status: DISCONTINUED | OUTPATIENT
Start: 2020-10-04 | End: 2020-10-10 | Stop reason: HOSPADM

## 2020-10-04 RX ADMIN — METHADONE HYDROCHLORIDE 30 MG: 5 SOLUTION ORAL at 21:15

## 2020-10-04 RX ADMIN — CEFTRIAXONE SODIUM 1 G: 1 INJECTION, POWDER, FOR SOLUTION INTRAMUSCULAR; INTRAVENOUS at 20:32

## 2020-10-04 RX ADMIN — ENOXAPARIN SODIUM 40 MG: 40 INJECTION SUBCUTANEOUS at 20:32

## 2020-10-04 RX ADMIN — NICOTINE POLACRILEX 2 MG: 2 GUM, CHEWING BUCCAL at 20:32

## 2020-10-04 RX ADMIN — NICOTINE 1 PATCH: 21 PATCH TRANSDERMAL at 15:00

## 2020-10-04 RX ADMIN — NICOTINE POLACRILEX 2 MG: 2 LOZENGE ORAL at 20:32

## 2020-10-04 RX ADMIN — KETOROLAC TROMETHAMINE 15 MG: 30 INJECTION, SOLUTION INTRAMUSCULAR; INTRAVENOUS at 23:22

## 2020-10-04 RX ADMIN — MORPHINE SULFATE 4 MG: 4 INJECTION, SOLUTION INTRAMUSCULAR; INTRAVENOUS at 14:51

## 2020-10-04 RX ADMIN — VANCOMYCIN HYDROCHLORIDE 1500 MG: 1 INJECTION, POWDER, LYOPHILIZED, FOR SOLUTION INTRAVENOUS at 17:43

## 2020-10-04 RX ADMIN — SODIUM CHLORIDE 1000 ML: 9 INJECTION, SOLUTION INTRAVENOUS at 14:48

## 2020-10-04 RX ADMIN — HYDROMORPHONE HYDROCHLORIDE 0.5 MG: 1 INJECTION, SOLUTION INTRAMUSCULAR; INTRAVENOUS; SUBCUTANEOUS at 18:20

## 2020-10-04 RX ADMIN — POTASSIUM CHLORIDE, DEXTROSE MONOHYDRATE AND SODIUM CHLORIDE: 150; 5; 900 INJECTION, SOLUTION INTRAVENOUS at 20:32

## 2020-10-04 RX ADMIN — TRAZODONE HYDROCHLORIDE 50 MG: 50 TABLET ORAL at 23:05

## 2020-10-04 RX ADMIN — HYDROMORPHONE HYDROCHLORIDE 1 MG: 1 INJECTION, SOLUTION INTRAMUSCULAR; INTRAVENOUS; SUBCUTANEOUS at 16:49

## 2020-10-04 RX ADMIN — ONDANSETRON 4 MG: 2 INJECTION INTRAMUSCULAR; INTRAVENOUS at 14:52

## 2020-10-04 RX ADMIN — METRONIDAZOLE 500 MG: 500 INJECTION, SOLUTION INTRAVENOUS at 14:55

## 2020-10-04 ASSESSMENT — ENCOUNTER SYMPTOMS
DIARRHEA: 1
APPETITE CHANGE: 0
MYALGIAS: 1
VOMITING: 0
JOINT SWELLING: 1
ARTHRALGIAS: 1
DIAPHORESIS: 1
NAUSEA: 1
COLOR CHANGE: 1
DIZZINESS: 1
CHILLS: 1

## 2020-10-04 ASSESSMENT — MIFFLIN-ST. JEOR: SCORE: 1343.6

## 2020-10-04 NOTE — PHARMACY-VANCOMYCIN DOSING SERVICE
Pharmacy Vancomycin Initial Note  Date of Service 2020  Patient's  1985  35 year old, female    Indication: Abscess, Skin and Soft Tissue Infection and h/o MRSA    Current estimated CrCl = CrCl cannot be calculated (Patient's most recent lab result is older than the maximum 10 days allowed.).    Creatinine for last 3 days  No results found for requested labs within last 72 hours.    Recent Vancomycin Level(s) for last 3 days  No results found for requested labs within last 72 hours.      Vancomycin IV Administrations (past 72 hours)      No vancomycin orders with administrations in past 72 hours.                Nephrotoxins and other renal medications (From now, onward)    Start     Dose/Rate Route Frequency Ordered Stop    10/04/20 1435  vancomycin (VANCOCIN) 1,500 mg in sodium chloride 0.9 % 250 mL intermittent infusion      1,500 mg  over 90 Minutes Intravenous ONCE 10/04/20 1434            Contrast Orders - past 72 hours (72h ago, onward)    None                Plan:  1.  Start vancomycin  1500 mg (21.6 mg/kg) IV x1 in ED.   2.  Goal Trough Level: 15-20 mg/L d/t abscess and h/o MRSA  3.  Pharmacy will check trough levels as appropriate in 1-3 Days.    4. Serum creatinine levels will be ordered daily for the first week of therapy and at least twice weekly for subsequent weeks.    5. [place] method utilized to dose vancomycin therapy: Method 2    Jose Luis Garvey RPH

## 2020-10-04 NOTE — PHARMACY-ADMISSION MEDICATION HISTORY
Admission medication history for the October 4, 2020 admission is complete.     Interview Sources:    Patient    Dispense Report    Changes made to PTA medication list (reason)  Added: None  Deleted:     Bacitracin 500 unit/gm ointment; apply topically BID (stopped per patient and fill hx)    Quetiapine 50 mg tabs; 1T PO Q6H PRN agitation (stopped per patient and fill hx)  Changed: None    Additional medication history information:     Patient denies use of prescription medications, over the counter medications, vitamins, or supplements.     Prior to Admission Medication List:  Prior to Admission medications    Not on File     Medication history completed by:   Moira Neves, Pharmacy Intern

## 2020-10-04 NOTE — ED NOTES
[facility]    ED Nurse to Floor Handoff     Giselle Mackenzie is a 35 year old female who speaks English and lives alone,  in a shelter  They arrived in the ED by unknown from home    ED Chief Complaint: Cellulitis (started two weeks ago; right hand, up arm, and left heel )    ED Dx;   Final diagnoses:   Cellulitis of upper extremity, unspecified laterality   Abscess of hand         Needed?: No    Allergies:   Allergies   Allergen Reactions     Sulfa Drugs Anaphylaxis     Latex Hives     Nickel Hives     Suboxone      fainting   .  Past Medical Hx:   Past Medical History:   Diagnosis Date     ADD (attention deficit disorder)      Asthma      Bipolar disorder, unspecified (H)      Cellulitis 2013     Cervical high risk HPV (human papillomavirus) test positive 12/07/2017 12/07/17: NIL pap, + HR HPV (not 16 or 18) result.      GERD (gastroesophageal reflux disease)      H/O ETOH abuse      H/O intravenous drug use in remission      OCD (obsessive compulsive disorder)      PTSD (post-traumatic stress disorder)       Baseline Mental status: WDL  Current Mental Status changes: at basesline    Infection present or suspected this encounter: cultures pending  Sepsis suspected: No  Isolation type: Contact     Activity level - Baseline/Home:  Independent  Activity Level - Current:   Independent    Bariatric equipment needed?: No    In the ED these meds were given:   Medications   0.9% sodium chloride BOLUS (1,000 mLs Intravenous New Bag 10/4/20 3868)     Followed by   sodium chloride 0.9% infusion (has no administration in time range)   metroNIDAZOLE (FLAGYL) infusion 500 mg (500 mg Intravenous New Bag 10/4/20 8995)   vancomycin (VANCOCIN) 1,500 mg in sodium chloride 0.9 % 250 mL intermittent infusion (1,500 mg Intravenous New Bag 10/4/20 6407)   nicotine (NICODERM CQ) 21 MG/24HR 24 hr patch 1 patch (1 patch Transdermal Patch/Med Applied 10/4/20 1500)   cefTRIAXone  (ROCEPHIN) 2 g vial to attach to  ml bag for ADULTS or NS 50 ml bag for PEDS (has no administration in time range)   lidocaine 1 % injection 2 mL (has no administration in time range)   lidocaine (PF) (XYLOCAINE) 1 % injection (has no administration in time range)   morphine (PF) injection 4 mg (4 mg Intravenous Given 10/4/20 1451)   ondansetron (ZOFRAN) injection 4 mg (4 mg Intravenous Given 10/4/20 1452)   HYDROmorphone (DILAUDID) injection 1 mg (1 mg Intramuscular Given 10/4/20 1649)       Drips running?  Yes    Home pump  No    Current LDAs  Peripheral IV 10/04/20 Left Upper arm (Active)   Line Status Saline locked 10/04/20 1417   Number of days: 0       Peripheral IV 10/04/20 Left Upper arm (Active)   Site Assessment WDL 10/04/20 1550   Line Status Saline locked 10/04/20 1550   Phlebitis Scale 0-->no symptoms 10/04/20 1550   Infiltration Scale 0 10/04/20 1550   Infiltration Site Treatment Method  None 10/04/20 1550   Was a Vesicant infusing? No 10/04/20 1550   Number of days: 0       Peripheral IV 03/04/19 Left Hand (Active)   Number of days: 580       Incision/Surgical Site 03/04/19 Right Arm (Active)   Number of days: 580       Labs results:   Labs Ordered and Resulted from Time of ED Arrival Up to the Time of Departure from the ED   CBC WITH PLATELETS DIFFERENTIAL - Abnormal; Notable for the following components:       Result Value    WBC 17.9 (*)     Absolute Neutrophil 13.0 (*)     Absolute Monocytes 1.5 (*)     All other components within normal limits   COMPREHENSIVE METABOLIC PANEL - Abnormal; Notable for the following components:    Calcium 8.4 (*)     ALT 79 (*)     All other components within normal limits   CRP INFLAMMATION - Abnormal; Notable for the following components:    CRP Inflammation 150.0 (*)     All other components within normal limits   DRUG ABUSE SCREEN 6 CHEM DEP URINE ([facility]) - Abnormal; Notable for the following components:    Amphetamine Qual Urine Positive (*)      "Cannabinoids Qual Urine Positive (*)     Cocaine Qual Urine Positive (*)     Opiates Qualitative Urine Positive (*)     All other components within normal limits   ERYTHROCYTE SEDIMENTATION RATE AUTO - Abnormal; Notable for the following components:    Sed Rate 33 (*)     All other components within normal limits   LACTIC ACID WHOLE BLOOD   HCG QUALITATIVE URINE   PROCALCITONIN   INR   COVID-19 VIRUS (CORONAVIRUS) BY PCR   BLOOD CULTURE   BLOOD CULTURE       Imaging Studies: No results found for this or any previous visit (from the past 24 hour(s)).    Recent vital signs:   Pulse 88   Temp 97.6  F (36.4  C) (Oral)   Resp 16   Ht 1.575 m (5' 2\")   SpO2 98%   BMI 28.02 kg/m              Cardiac Rhythm: Normal Sinus  Pt needs tele? No  Skin/wound Issues: yes    Code Status: Full Code    Pain control: good    Nausea control: good    Abnormal labs/tests/findings requiring intervention: none    Family present during ED course? No   Family Comments/Social Situation comments: no    Tasks needing completion: None    Gideon Carlos RN  [facility] -- [phone] [place] [facility]  [phone] [facility]    "

## 2020-10-04 NOTE — ED PROVIDER NOTES
[facility] EMERGENCY DEPARTMENT ([facility])     October 4, 2020    History     Chief Complaint   Patient presents with     Cellulitis     started two weeks ago; right hand, up arm, and left heel      The history is provided by the patient and medical records.     Giselle Mackenzie is a 35 year old female with a PMH for cellulitis, asthma, GERD, PTSD, MRSA, OCD, bipolar disorder, ADD, EtOH and IV drug use who presents to the ED with complaint of cellulitis - hands, arms, diarrhea and left armpit abscess.    Patient reports her symptoms began over the last week. She notes her right hand is swollen, reddening and warm to the touch. She is unable to move her fingers due to the swelling. Patient has 2 similar raised red sites on her right arm, corresponding to prior injection sites but none on her right hand. Patient's left hand is swollen, with similar redness and warmth but not to the same degree as the right hand.  She notes last using heroin yesterday and has been slowly tapering off.  Reports she typically uses half a gram every 2 to 3 days.  She notes feeling feverish and nauseous since onset of her symptoms. Patient denies any episodes of emesis and has been eating normally.  She is able to range her upper extremities at the shoulders, elbow and wrist however has limited finger extension of the right hand.     Patient reports having a left armpit abscess that has an infection. She notes having chronic low back pain.     PAST MEDICAL HISTORY:   Past Medical History:   Diagnosis Date     ADD (attention deficit disorder)      Asthma      Bipolar disorder, unspecified (H)      Cellulitis 2013     Cervical high risk HPV (human papillomavirus) test positive 12/07/2017 12/07/17: NIL pap, + HR HPV (not 16 or 18) result.      GERD (gastroesophageal reflux disease)      H/O ETOH abuse      H/O intravenous drug use in remission      OCD (obsessive compulsive disorder)      PTSD (post-traumatic stress disorder)         PAST SURGICAL HISTORY:   Past Surgical History:   Procedure Laterality Date     HAND SURGERY Right     MRSA abscess drainage     HC TOOTH EXTRACTION W/FORCEP       IRRIGATION AND DEBRIDEMENT BONE UPPER EXTREMITY, COMBINED Right 3/4/2019    Procedure: IRRIGATION AND DEBRIDEMENT RIGHT FOREARM ABSCESS;  Surgeon: Gideon Black MD;  Location: [facility] OR       Past medical history, past surgical history, medications, and allergies were reviewed with the patient. Additional pertinent items: None    FAMILY HISTORY:   Family History   Problem Relation Age of Onset     Diabetes Mother      Cervical Cancer Maternal Grandmother      Cerebrovascular Disease Maternal Grandfather      Alcoholism Paternal Uncle      Cirrhosis Paternal Uncle      Alcoholism Paternal Uncle      Cirrhosis Paternal Uncle      Migraines Paternal Grandfather      Coronary Artery Disease Early Onset Paternal Grandfather 55     Colon Cancer No family hx of        SOCIAL HISTORY:   Social History     Tobacco Use     Smoking status: Current Every Day Smoker     Packs/day: 2.00     Smokeless tobacco: Never Used   Substance Use Topics     Alcohol use: No     Social history was reviewed with the patient. Additional pertinent items: None      Patient's Medications   New Prescriptions    No medications on file   Previous Medications    BACITRACIN 500 UNIT/GM OINT    Apply topically 2 times daily    QUETIAPINE (SEROQUEL) 50 MG TABLET    Take 1 tablet (50 mg) by mouth every 6 hours as needed (Moderate agitation)   Modified Medications    No medications on file   Discontinued Medications    No medications on file          Allergies   Allergen Reactions     Sulfa Drugs Anaphylaxis     Latex Hives     Nickel Hives     Suboxone      fainting        Review of Systems   Constitutional: Positive for chills and diaphoresis. Negative for appetite change.   Eyes: Positive for visual disturbance.   Gastrointestinal: Positive for diarrhea and nausea. Negative for  vomiting.   Musculoskeletal: Positive for arthralgias, joint swelling and myalgias.        Bilateral hands and arms redness, swelling and pain   Skin: Positive for color change.   Neurological: Positive for dizziness.   All other systems reviewed and are negative.    A complete review of systems was performed with pertinent positives and negatives noted in the HPI, and all other systems negative.    Physical Exam   Temp: 97.6  F (36.4  C)  SpO2: 98 %      Physical Exam  General: patient is alert and oriented, tearful and pacing  Head: atraumatic and normocephalic   EENT: moist mucus membranes, pupils round and reactive, sclera anicteric  Neck: supple with full range of motion  Cardiovascular: regular rate and rhythm, extremities warm and well perfused, no lower extremity edema, 2+ radial pulses bilaterally, brisk capillary refill into the digits of the right and left hand, no cardiac murmur appreciated  Pulmonary: lungs clear to auscultation bilaterally   Abdomen: soft, non-tender   Musculoskeletal: normal range of motion of both shoulders, elbows and wrists, very limited finger extension secondary to swelling on the right hand  Neurological: alert and oriented, moving all extremities symmetrically, gait normal   Skin: warm, dry, areas of infection as below                            ED Course        [facility]     PROCEDURE: -Incision/Drainage    Date/Time: 10/4/2020 5:55 PM  Performed by: Kayce Rios MD  Authorized by: Kayce Rios MD       LOCATION:      Type:  Abscess    Location:  Upper extremity    Upper extremity location:  Hand    Hand location:  L hand    PRE-PROCEDURE DETAILS:     Skin preparation:  Chloraprep    ANESTHESIA (see MAR for exact dosages):     Anesthesia method:  Local infiltration    Local anesthetic:  Lidocaine 1% w/o epi    PROCEDURE TYPE:     Complexity:  Simple    PROCEDURE DETAILS:     Needle aspiration: no      Incision  types:  Single straight    Incision depth:  Dermal    Scalpel blade:  11    Wound management:  Probed and deloculated    Drainage:  Purulent    Drainage amount:  Moderate    Wound treatment:  Wound left open    Packing materials:  1/4 in iodoform gauze  Post-procedure details:     PROCEDURE   Patient Tolerance:  Patient tolerated the procedure well with no immediate complications    [facility]     PROCEDURE: -Incision/Drainage    Date/Time: 10/4/2020 5:56 PM  Performed by: Kayce Rios MD  Authorized by: Kayce Rios MD       LOCATION:      Type:  Abscess    Location:  Upper extremity    Upper extremity location:  Hand    Hand location:  R hand    PRE-PROCEDURE DETAILS:     Skin preparation:  Chloraprep    ANESTHESIA (see MAR for exact dosages):     Anesthesia method:  Local infiltration    Local anesthetic:  Lidocaine 1% w/o epi    PROCEDURE TYPE:     Complexity:  Simple    PROCEDURE DETAILS:     Needle aspiration: no      Incision types:  Single straight    Incision depth:  Dermal    Scalpel blade:  11    Wound management:  Probed and deloculated    Drainage:  Purulent    Drainage amount:  Copious    Wound treatment:  Wound left open    Packing materials:  1/4 in iodoform gauze  Post-procedure details:     PROCEDURE   Patient Tolerance:  Patient tolerated the procedure well with no immediate complications                                 No results found for this or any previous visit (from the past 24 hour(s)).  Medications - No data to display          Assessments & Plan (with Medical Decision Making)   This is a 35 year old female with a PMH for cellulitis, asthma, GERD, PTSD, MRSA, OCD, bipolar disorder, ADD, EtOH and IV drug use who presents to the ED with complaint of cellulitis - hands, arms, diarrhea and left armpit abscess.  She is currently hemodynamically stable and afebrile.  Given her numerous abscesses I am concerned for appropriate  healing.  Her prior wound cultures have had multiple organisms included.  We will treat with vancomycin, Flagyl and ceftriaxone.  At this time does not appear to have evidence of necrotizing infection.  She does not have signs of compartment syndrome.  I did discuss with orthopedic surgery regarding potential for or washout and recommended bedside I&D.  All rings are removed with 2 rings cut off.  The abscesses on the dorsal surface of both hands were incised and drained.  Packing was placed.  She does have erythema on the right elbow however this does appear to be draining on its own.  In addition she has some area of chronic inflammation in the left axilla an area that is draining on its own as well.  We will plan to admit to medicine for continued IV antibiotics and close follow-up of the wounds with potential for additional or washout if not improving with antibiotics and after I&D.      I have reviewed the nursing notes.    I have reviewed the findings, diagnosis, plan and need for follow up with the patient.    New Prescriptions    No medications on file       Final diagnoses:   Cellulitis of upper extremity, unspecified laterality   Abscess of hand       10/4/2020   [facility] EMERGENCY DEPARTMENT     Kayce Rios MD  10/04/20 8053       Kayce Rios MD  10/04/20 3416

## 2020-10-05 LAB
ALBUMIN SERPL-MCNC: 2.4 G/DL (ref 3.4–5)
ALP SERPL-CCNC: 65 U/L (ref 40–150)
ALT SERPL W P-5'-P-CCNC: 65 U/L (ref 0–50)
ANION GAP SERPL CALCULATED.3IONS-SCNC: 4 MMOL/L (ref 3–14)
AST SERPL W P-5'-P-CCNC: 38 U/L (ref 0–45)
BASOPHILS # BLD AUTO: 0 10E9/L (ref 0–0.2)
BASOPHILS NFR BLD AUTO: 0.5 %
BILIRUB SERPL-MCNC: 0.3 MG/DL (ref 0.2–1.3)
BUN SERPL-MCNC: 8 MG/DL (ref 7–30)
CALCIUM SERPL-MCNC: 7.8 MG/DL (ref 8.5–10.1)
CHLORIDE SERPL-SCNC: 112 MMOL/L (ref 94–109)
CO2 SERPL-SCNC: 21 MMOL/L (ref 20–32)
CREAT SERPL-MCNC: 0.55 MG/DL (ref 0.52–1.04)
CRP SERPL-MCNC: 120 MG/L (ref 0–8)
DIFFERENTIAL METHOD BLD: NORMAL
EOSINOPHIL # BLD AUTO: 0.2 10E9/L (ref 0–0.7)
EOSINOPHIL NFR BLD AUTO: 2.9 %
ERYTHROCYTE [DISTWIDTH] IN BLOOD BY AUTOMATED COUNT: 14.2 % (ref 10–15)
ERYTHROCYTE [SEDIMENTATION RATE] IN BLOOD BY WESTERGREN METHOD: 38 MM/H (ref 0–20)
GFR SERPL CREATININE-BSD FRML MDRD: >90 ML/MIN/{1.73_M2}
GLUCOSE SERPL-MCNC: 101 MG/DL (ref 70–99)
HCT VFR BLD AUTO: 37.6 % (ref 35–47)
HGB BLD-MCNC: 12 G/DL (ref 11.7–15.7)
IMM GRANULOCYTES # BLD: 0.1 10E9/L (ref 0–0.4)
IMM GRANULOCYTES NFR BLD: 0.6 %
LABORATORY COMMENT REPORT: NORMAL
LYMPHOCYTES # BLD AUTO: 2.1 10E9/L (ref 0.8–5.3)
LYMPHOCYTES NFR BLD AUTO: 27.3 %
MCH RBC QN AUTO: 29.2 PG (ref 26.5–33)
MCHC RBC AUTO-ENTMCNC: 31.9 G/DL (ref 31.5–36.5)
MCV RBC AUTO: 92 FL (ref 78–100)
MONOCYTES # BLD AUTO: 0.8 10E9/L (ref 0–1.3)
MONOCYTES NFR BLD AUTO: 10.2 %
NEUTROPHILS # BLD AUTO: 4.6 10E9/L (ref 1.6–8.3)
NEUTROPHILS NFR BLD AUTO: 58.5 %
NRBC # BLD AUTO: 0 10*3/UL
NRBC BLD AUTO-RTO: 0 /100
PLATELET # BLD AUTO: 356 10E9/L (ref 150–450)
POTASSIUM SERPL-SCNC: 4.4 MMOL/L (ref 3.4–5.3)
PROT SERPL-MCNC: 6.6 G/DL (ref 6.8–8.8)
RBC # BLD AUTO: 4.11 10E12/L (ref 3.8–5.2)
SARS-COV-2 RNA SPEC QL NAA+PROBE: NEGATIVE
SARS-COV-2 RNA SPEC QL NAA+PROBE: NORMAL
SODIUM SERPL-SCNC: 137 MMOL/L (ref 133–144)
SPECIMEN SOURCE: NORMAL
SPECIMEN SOURCE: NORMAL
WBC # BLD AUTO: 7.9 10E9/L (ref 4–11)

## 2020-10-05 PROCEDURE — 99232 SBSQ HOSP IP/OBS MODERATE 35: CPT | Performed by: INTERNAL MEDICINE

## 2020-10-05 PROCEDURE — 85652 RBC SED RATE AUTOMATED: CPT | Performed by: INTERNAL MEDICINE

## 2020-10-05 PROCEDURE — 36415 COLL VENOUS BLD VENIPUNCTURE: CPT | Performed by: INTERNAL MEDICINE

## 2020-10-05 PROCEDURE — 250N000011 HC RX IP 250 OP 636: Performed by: INTERNAL MEDICINE

## 2020-10-05 PROCEDURE — 99221 1ST HOSP IP/OBS SF/LOW 40: CPT | Performed by: NURSE PRACTITIONER

## 2020-10-05 PROCEDURE — 258N000003 HC RX IP 258 OP 636: Performed by: INTERNAL MEDICINE

## 2020-10-05 PROCEDURE — 99222 1ST HOSP IP/OBS MODERATE 55: CPT | Performed by: INTERNAL MEDICINE

## 2020-10-05 PROCEDURE — 120N000002 HC R&B MED SURG/OB UMMC

## 2020-10-05 PROCEDURE — 80053 COMPREHEN METABOLIC PANEL: CPT | Performed by: INTERNAL MEDICINE

## 2020-10-05 PROCEDURE — 250N000013 HC RX MED GY IP 250 OP 250 PS 637: Performed by: NURSE PRACTITIONER

## 2020-10-05 PROCEDURE — 258N000001 HC RX 258: Performed by: INTERNAL MEDICINE

## 2020-10-05 PROCEDURE — 85025 COMPLETE CBC W/AUTO DIFF WBC: CPT | Performed by: INTERNAL MEDICINE

## 2020-10-05 PROCEDURE — 86140 C-REACTIVE PROTEIN: CPT | Performed by: INTERNAL MEDICINE

## 2020-10-05 PROCEDURE — 250N000013 HC RX MED GY IP 250 OP 250 PS 637: Performed by: INTERNAL MEDICINE

## 2020-10-05 PROCEDURE — 96372 THER/PROPH/DIAG INJ SC/IM: CPT | Performed by: INTERNAL MEDICINE

## 2020-10-05 RX ORDER — AMPICILLIN AND SULBACTAM 2; 1 G/1; G/1
3 INJECTION, POWDER, FOR SOLUTION INTRAMUSCULAR; INTRAVENOUS EVERY 6 HOURS
Status: DISCONTINUED | OUTPATIENT
Start: 2020-10-05 | End: 2020-10-10 | Stop reason: HOSPADM

## 2020-10-05 RX ORDER — QUETIAPINE FUMARATE 25 MG/1
25 TABLET, FILM COATED ORAL AT BEDTIME
Status: DISCONTINUED | OUTPATIENT
Start: 2020-10-05 | End: 2020-10-10 | Stop reason: HOSPADM

## 2020-10-05 RX ORDER — TRAZODONE HYDROCHLORIDE 50 MG/1
50 TABLET, FILM COATED ORAL AT BEDTIME
Status: DISCONTINUED | OUTPATIENT
Start: 2020-10-05 | End: 2020-10-10 | Stop reason: HOSPADM

## 2020-10-05 RX ORDER — ESCITALOPRAM OXALATE 10 MG/1
10 TABLET ORAL DAILY
Status: DISCONTINUED | OUTPATIENT
Start: 2020-10-06 | End: 2020-10-10 | Stop reason: HOSPADM

## 2020-10-05 RX ADMIN — AMPICILLIN SODIUM AND SULBACTAM SODIUM 3 G: 2; 1 INJECTION, POWDER, FOR SOLUTION INTRAMUSCULAR; INTRAVENOUS at 21:01

## 2020-10-05 RX ADMIN — TRAZODONE HYDROCHLORIDE 50 MG: 50 TABLET ORAL at 22:57

## 2020-10-05 RX ADMIN — ENOXAPARIN SODIUM 40 MG: 40 INJECTION SUBCUTANEOUS at 21:03

## 2020-10-05 RX ADMIN — NICOTINE POLACRILEX 2 MG: 2 LOZENGE ORAL at 18:05

## 2020-10-05 RX ADMIN — AMPICILLIN SODIUM AND SULBACTAM SODIUM 3 G: 2; 1 INJECTION, POWDER, FOR SOLUTION INTRAMUSCULAR; INTRAVENOUS at 13:31

## 2020-10-05 RX ADMIN — VANCOMYCIN HYDROCHLORIDE 1500 MG: 1 INJECTION, POWDER, LYOPHILIZED, FOR SOLUTION INTRAVENOUS at 17:54

## 2020-10-05 RX ADMIN — VANCOMYCIN HYDROCHLORIDE 1500 MG: 1 INJECTION, POWDER, LYOPHILIZED, FOR SOLUTION INTRAVENOUS at 05:36

## 2020-10-05 RX ADMIN — METHADONE HYDROCHLORIDE 20 MG: 5 SOLUTION ORAL at 21:05

## 2020-10-05 RX ADMIN — POTASSIUM CHLORIDE, DEXTROSE MONOHYDRATE AND SODIUM CHLORIDE: 150; 5; 900 INJECTION, SOLUTION INTRAVENOUS at 03:36

## 2020-10-05 RX ADMIN — OMEPRAZOLE 40 MG: 20 CAPSULE, DELAYED RELEASE ORAL at 10:02

## 2020-10-05 NOTE — PROGRESS NOTES
"[facility], [place]   Internal Medicine Daily Note           Interval History/Events     Overnight events reviewed  Reports feeling better in hand after antibiotics  Methadone helped  No nausea, vomiting, chest pain, shortness of breath  No lightheadedness or dizziness.   No fever, chills         Review of Systems        4 point ROS including Respiratory, CV, GI and , other than that noted above is negative      Medications   I have reviewed current medications  in the \"current medication\" section of Epic.  Relevant changes include:     Physical Exam   General:       Vital signs:    Blood pressure 106/67, pulse 79, temperature 97  F (36.1  C), temperature source Oral, resp. rate 20, height 1.575 m (5' 2\"), weight 70 kg (154 lb 6.4 oz), SpO2 (P) 98 %, not currently breastfeeding.  Estimated body mass index is 28.24 kg/m  as calculated from the following:    Height as of this encounter: 1.575 m (5' 2\").    Weight as of this encounter: 70 kg (154 lb 6.4 oz).    No intake or output data in the 24 hours ending 10/05/20 1220     Constitutional: Laying in bed in no acute distress  Eye: No icterus ,no pallor  Mouth/ENT: Normal oral mucosa  Cardiovascular: S1, S2 normal.   Respiratory: B/L CTA  GI: Soft, NT, BS+  :   Neurology: Alert, awake, and oriented.   Psych: Mood stable.   MSK: b/l dorsum of hand swollen with some tenderness. Wick in place in both wound.   Also has wound on the right medial elbow area, Other multiple small abscess in both arms at various stages. Picture on the chart  Also has small wound on the right achilles tendon area.   Integumentary:   Heme/Lymph/Imm:      Laboratory and Imaging Studies     I have reviewed  laboratory and imaging studies in the Epic. Pertinent findings are as below:    BMP  Recent Labs   Lab 10/05/20  0834 10/04/20  1957 10/04/20  1405     --  135   POTASSIUM 4.4  --  4.8   CHLORIDE 112*  --  102   BRYANT 7.8*  --  8.4*   CO2 21  --  25   BUN 8  " --  10   CR 0.55 0.77 0.58   *  --  79     CBC  Recent Labs   Lab 10/05/20  0834 10/04/20  1405   WBC 7.9 17.9*   RBC 4.11 4.60   HGB 12.0 13.4   HCT 37.6 40.1   MCV 92 87   MCH 29.2 29.1   MCHC 31.9 33.4   RDW 14.2 13.7    419     INRNo lab results found in last 7 days.  LFTs  Recent Labs   Lab 10/05/20  0834 10/04/20  1405   ALKPHOS 65 89   AST 38 42   ALT 65* 79*   BILITOTAL 0.3 0.5   PROTTOTAL 6.6* 7.7   ALBUMIN 2.4* 3.4      PANCNo lab results found in last 7 days.        Impression/Plan          35 year old female admitted on 10/4/2020. She PMH for cellulitis, asthma, GERD, PTSD, MRSA, OCD, bipolar disorder, ADD, EtOH and IV drug use who presents to the ED with complaint of cellulitis - hands, arms, diarrhea and left armpit abscess.  She is S/P I&D of both hand abscesses ( Dorsum) in the ED by ED provider as advised by orthopedic team.  Individual problems and their management are outlined below     # Multiple bilateral upper extremity abscess:  Cause of abscesses being IVDU.2 large abscesses on the dorsum of both hands now s/p  I & D on 10/04 in ED. She has multiple other abscesses in evolution at various stages in both her arms ( please see separate picture from ER provider note). More importantly, patient is also at risk for bacteremia. Wound culture and blood cultures were sent. Patient received IV ceftriaxone, vancomycin and Metronidazole in the ER. She was resumed on  IV vanc and ceftriaxone on admission. She was started on oral Tylenol and IV ketorolac for pain management.   - Continue Vancomycin, Ceftriaxone  - Follow wound culture data  - ID and Ortho consult (order placed)  - Pain control       # H/O IVDU  IV drug of choice is IV heroin and Methamphetamine  Urine analysis is positive amphetamine, Cocaine, opiates and Cannabinoids   Likely will go through severe withdrawals    COWS monitoring   Methadone 30-20-10 started  Psychiatry consulted  Patient wishes to pursue treatment. However  patient does not have insurance  SW consult to pursue with application for appropriate financial assistance         # Tobacco abuse:  Patient smokes about 2-3 pcs of cigarettes/ day  Continue Patch + gum for now         #GERD  Omeprazole 40 me every morning         #PTSD, OCD, Bipolar disorder and ADD  Currently not on any medications         Diet: Combination Diet Regular Diet Adult    DVT Prophylaxis: Enoxaparin (Lovenox) SQ  Pablo Catheter: not present  Code Status: Full Code                    Pt's care was discussed with bedside RN, patient and  during Care Team Rounds.               Wilfrid Iqbal MD  Hospitalist ( Internal medicine)  Pager: [phone]

## 2020-10-05 NOTE — PLAN OF CARE
Pt arrived from ED via cart, walked from cart to bed-steady on feet.  VSS, settled into bed- PIV infusing antibiotics- will give report to oncoming nurse.

## 2020-10-05 NOTE — CONSULTS
GENERAL ID SERVICE CONSULTATION     Patient:  Giselle Mackenzie   Date of birth 1985, Medical record number [id number]  Date of Visit:  10/05/2020  Date of Admission: 10/4/2020  Consult Requester:No att. providers found            Assessment and Recommendations:   ASSESSMENT:  1. Multiple soft tissue abscesses s/p I/D of bilateral dorsal hands. Cx showing GAS. I don't see clinical evidence for necrotizing infection. Likely etiology injection drug use.  2. Substance use disorder, heroin, methamphetamines. 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  3. PTSD  4. HCV Ab positive, treatment naive as far as I'm aware  5. History positive TPPA, last RPR in 10/2017 1:2      RECOMMENDATION:  1. I have switched ceftriaxone to amp/sulbactam for optimal coverage of GAS. Reasonable to continue vancomycin since polymicrobial infection not excluded, but if no resistant gram-positives grow can likely stop this agent.  2. Collect HIV Ab/Ag, GC/Chlamydia, HCV RNA, HepB Surf Ag, TPPA. Note that TPPA positive in the past, so will reflex to RPR, which at last check was 1:2 so a value higher than this would suggest acute infection. (I have ordered)    Thanks for this consult. ID will follow.  Jen Duval MD   of Medicine, Division of Infectious Diseases  [facility] [phone]        ________________________________________________________________    Consult Question:.  Admission Diagnosis: Abscess of hand [L02.519]  Cellulitis of upper extremity, unspecified laterality [L03.119]         History of Present Illness:     This is a 36 y/o woman with PMH substance use disorder, injection drug use, bipolar disorder, and PTSD. She is admitted with bilateral upper extremity cellulitis.    The patient was sleeping when I entered her room, Upon wakening, she is alert, but quite distracted by the presence of her meal tray and reports that she is very hungry and does not independently  offer a history. She affirms my account after reviewing her record: she had mild constitutional symptoms prior to arrival and the development of bilateral hand abscesses that correspond to injection sites. She also has a more subacute nodule in her L axilla. She underwent I/D of bilateral hand abscesses in the ED. Preliminary cx of her R hand revealed GAS after 24hrs incubation. Bl cx are NG at 24hrs. She was placed on vanco and ceftriaxone.    No SOBr. No chest pain. No neurologic sx. No dysuria or GI symptoms. Appetite good. No vaginal discharge. No oral pain or sores. No skin findings apart from HPI.        Recent culture results include:  Culture Micro   Date Value Ref Range Status   10/04/2020 (A)  Preliminary    Moderate growth  Beta hemolytic Streptococcus group A  This organism is susceptible to ampicillin, penicillin, vancomycin and the cephalosporins.   If treatment is required AND your patient is allergic to penicillin, contact the   Microbiology Lab within 5 days to request susceptibility testing.     10/04/2020 Culture in progress  Preliminary   10/04/2020 No growth after 22 hours  Preliminary   03/04/2019 (A)  Final    Moderate growth  beta hemolytic   Streptococcus constellatus     03/04/2019 (A)  Final    Moderate growth  Haemophilus parainfluenzae  Susceptibility testing not routinely done     03/04/2019 Moderate growth  Eikenella corrodens   (A)  Final   03/04/2019 Heavy growth  Mixed aerobic and anaerobic duc   (A)  Final   03/04/2019 (A)  Final    Heavy growth  Actinomyces odontolyticus  Susceptibility testing not routinely done     03/04/2019 (A)  Final    Heavy growth  Anaerobic gram positive rods  Unable to completely identify  Susceptibility testing not routinely done     02/27/2019 No growth  Final              Review of Systems:   CONSTITUTIONAL:  SEe HPI - subjective fever PTA  EYES: negative for icterus  ENT:  negative for hearing loss, tinnitus and sore throat  RESPIRATORY:  negative  for cough with sputum and dyspnea  CARDIOVASCULAR:  negative for chest pain, dyspnea  GASTROINTESTINAL:  negative for nausea, vomiting, diarrhea and constipation  GENITOURINARY:  negative for dysuria  HEME:  No easy bruising  INTEGUMENT:  See HPI  NEURO:  Negative for headache           Past Medical History:     Past Medical History:   Diagnosis Date     ADD (attention deficit disorder)      Asthma      Bipolar disorder, unspecified (H)      Cellulitis 2013     Cervical high risk HPV (human papillomavirus) test positive 12/07/2017 12/07/17: NIL pap, + HR HPV (not 16 or 18) result.      GERD (gastroesophageal reflux disease)      H/O ETOH abuse      H/O intravenous drug use in remission      OCD (obsessive compulsive disorder)      PTSD (post-traumatic stress disorder)             Past Surgical History:     Past Surgical History:   Procedure Laterality Date     HAND SURGERY Right     MRSA abscess drainage     HC TOOTH EXTRACTION W/FORCEP       IRRIGATION AND DEBRIDEMENT BONE UPPER EXTREMITY, COMBINED Right 3/4/2019    Procedure: IRRIGATION AND DEBRIDEMENT RIGHT FOREARM ABSCESS;  Surgeon: Gideon Black MD;  Location:  OR            Family History:     Family History   Problem Relation Age of Onset     Diabetes Mother      Cervical Cancer Maternal Grandmother      Cerebrovascular Disease Maternal Grandfather      Alcoholism Paternal Uncle      Cirrhosis Paternal Uncle      Alcoholism Paternal Uncle      Cirrhosis Paternal Uncle      Migraines Paternal Grandfather      Coronary Artery Disease Early Onset Paternal Grandfather 55     Colon Cancer No family hx of             Social History:     Social History     Tobacco Use     Smoking status: Current Every Day Smoker     Packs/day: 2.00     Smokeless tobacco: Never Used   Substance Use Topics     Alcohol use: No     History   Sexual Activity     Sexual activity: Yes     Partners: Female, Male     Birth control/ protection: None            Current  "Medications (antimicrobials listed in bold):       ampicillin-sulbactam (UNASYN) IV  3 g Intravenous Q6H     enoxaparin ANTICOAGULANT  40 mg Subcutaneous Q24H     [START ON 10/6/2020] methadone  10 mg Oral Once     methadone  20 mg Oral Once     omeprazole  40 mg Oral QAM AC     sodium chloride (PF)  3 mL Intracatheter Q8H     vancomycin (VANCOCIN) IV  1,500 mg Intravenous Q12H            Allergies:     Allergies   Allergen Reactions     Sulfa Drugs Anaphylaxis     Latex Hives     Nickel Hives     Suboxone      fainting            Physical Exam:   Vitals were reviewed  Patient Vitals for the past 24 hrs:   BP Temp Temp src Pulse Resp SpO2 Height Weight   10/05/20 0751 106/67 97  F (36.1  C) Oral 79 20 (P) 98 % -- --   10/04/20 2043 -- -- -- -- -- -- 1.575 m (5' 2\") 70 kg (154 lb 6.4 oz)   10/04/20 1900 112/62 98.4  F (36.9  C) Oral 116 16 98 % -- --   10/04/20 1813 -- 99  F (37.2  C) Oral -- -- -- -- --   10/04/20 1806 (!) 148/78 -- -- 78 16 98 % -- --   10/04/20 1538 -- -- -- 88 -- -- -- --     Ranges for his vital signs:  Temp:  [97  F (36.1  C)-99  F (37.2  C)] 97  F (36.1  C)  Pulse:  [] 79  Resp:  [16-20] 20  BP: (106-148)/(62-78) 106/67  SpO2:  [98 %] (P) 98 %    Physical Examination:  GENERAL:  Appears somewhat disheveled, NAD  HEENT:  Head is normocephalic, atraumatic   EYES:  Eyes have anicteric sclerae without conjunctival injection or stigmata of endocarditis.    ENT:  Oropharynx is moist without exudates or ulcers. Tongue is midline  NECK:  Supple. No  Cervical lymphadenopathy  LUNGS:  Clear to auscultation bilateral.   CARDIOVASCULAR:  Regular rate and rhythm with no murmurs, gallops or rubs.  ABDOMEN:  Normal bowel sounds, soft, nontender. No appreciable hepatosplenomegaly  SKIN:  Bilateral hands are wrapped, s/p I/D. L forearm with nodule, ulcerated with possibly some fluctuance. Some discomfort with movement of her fingers. Pain with palpation of her L axilla.  NEUROLOGIC:  Grossly nonfocal. " Active x4 extremities         Laboratory Data:     Inflammatory Markers    Recent Labs   Lab Test 10/05/20  0834 10/04/20  1405 02/27/19  0023   SED 38* 33* 10   .0* 150.0* 21.3*       Hematology Studies    Recent Labs   Lab Test 10/05/20  0834 10/04/20  1405 03/05/19  0623 03/04/19  0714 03/01/19  0656 02/27/19  0023 01/16/16  1442 01/16/16  1442 09/09/13  1450   WBC 7.9 17.9* 7.8 10.6 9.0 10.3   < > 19.1* 12.3*   ANEU 4.6 13.0*  --   --   --  6.1  --  12.4* 7.2   AEOS 0.2 0.3  --   --   --  0.1  --  0.2 0.5   HGB 12.0 13.4 Drawn above stopped IV 14.4 15.4 14.4   < > 14.9 13.2   MCV 92 87 Drawn above stopped IV 85 84 83   < > 87 90    419 Drawn above stopped  363 364   < > 380 344    < > = values in this interval not displayed.       Immune Globulin Studies  No lab results found.    Metabolic Studies     Recent Labs   Lab Test 10/05/20  0834 10/04/20  1957 10/04/20  1405 03/05/19  0623 03/04/19  0714 03/01/19  0656     --  135 Drawn above stopped  141   POTASSIUM 4.4  --  4.8 Drawn above stopped IV 3.9 3.9   CHLORIDE 112*  --  102 Drawn above stopped  113*   CO2 21  --  25 Drawn above stopped IV 23 23   BUN 8  --  10 Drawn above stopped IV 12 11   CR 0.55 0.77 0.58 Drawn above stopped IV 0.84 0.89   GFRESTIMATED >90 >90 >90 >90 >90 84       Hepatic Studies    Recent Labs   Lab Test 10/05/20  0834 10/04/20  1405 03/05/18  1044 09/09/13  1450 08/16/13  1002 08/12/13  1415   BILITOTAL 0.3 0.5 0.2 <0.1* 0.3 0.2   ALKPHOS 65 89 122 70 59 60   ALBUMIN 2.4* 3.4 3.7 4.1 4.3 4.2   AST 38 42 32 17 18 22   ALT 65* 79* 56* 26 28 29       Thyroid Studies    Recent Labs   Lab Test 01/08/18  1044 09/09/13  1450   TSH 1.90 1.36       Microbiology:  Culture Micro   Date Value Ref Range Status   10/04/2020 (A)  Preliminary    Moderate growth  Beta hemolytic Streptococcus group A  This organism is susceptible to ampicillin, penicillin, vancomycin and the cephalosporins.   If treatment is required  AND your patient is allergic to penicillin, contact the   Microbiology Lab within 5 days to request susceptibility testing.     10/04/2020 Culture in progress  Preliminary   10/04/2020 No growth after 22 hours  Preliminary   03/04/2019 (A)  Final    Moderate growth  beta hemolytic   Streptococcus constellatus     03/04/2019 (A)  Final    Moderate growth  Haemophilus parainfluenzae  Susceptibility testing not routinely done     03/04/2019 Moderate growth  Eikenella corrodens   (A)  Final   03/04/2019 Heavy growth  Mixed aerobic and anaerobic duc   (A)  Final   03/04/2019 (A)  Final    Heavy growth  Actinomyces odontolyticus  Susceptibility testing not routinely done     03/04/2019 (A)  Final    Heavy growth  Anaerobic gram positive rods  Unable to completely identify  Susceptibility testing not routinely done     02/27/2019 No growth  Final   02/27/2019 No growth  Final   10/25/2017 No Beta Streptococcus isolated  Final   09/22/2017 >100,000 colonies/mL  Escherichia coli   (A)  Final   05/25/2017 (A)  Final    >100,000 colonies/mL Escherichia coli  <10,000 colonies/mL mixed urogenital duc Susceptibility testing not routinely   done     01/16/2016   Final    10,000 to 50,000 colonies/mL mixed urogenital duc   04/17/2010   Final    10 to 50,000 colonies/mL Mixed gram negative and positive duc     Comment:     Multiple species present, probable perineal contamination.  Susceptibility testing not routinely done   04/17/2010   Final    Canceled:  Specimen improperly labeled. Canceled, Test credited   04/17/2010 10 to 50,000 colonies/mL Escherichia coli  Final   04/16/2010 Normal duc  Final       Urine Studies    Recent Labs   Lab Test 01/16/16  1450 09/09/13  1459 08/16/13  0942 08/12/13  1535   LEUKEST Large* Negative Negative Negative   WBCU >182*  --  0 <1       Vancomycin Levels  No lab results found.    Invalid input(s): VANCO    Serostatus:  No results found for: CMVG, CMIGG, CMIG, CMIM, CMVIGM, CMVM,  CMLTX, HSVG1, HSVG2, HSVTP1, [id number], HS12M, HS12GR, HS1GR, HS2GR, HERPES, HSIM, HSIG, HSIGR, HSVIGMAB, HSVG1, VARICZOSAB, EBVIGG, EBIGG, EBVAGN, [id number]  No results found for: EBIG2, EBIGM, TOXG  No lab results found.    Invalid input(s): HSV12, VZVG, [id number]    Toxoplasma Testing  No lab results found.    Invalid input(s): TOXPL, TOXABM, TOXPCR    Virology:  CMV viral loads  No lab results found.  No results found for: CMQNT, CMVQ  EBV viral loads   No lab results found.  No results found for: EBVDN, EBRES, EBVDN, EBVSP, EBVPC, EBVPCR  BK viral loads No lab results found.    Invalid input(s): BKDN, BKVPC, BKVPCR  HSV testing  No lab results found.    Hepatitis B Testing   Recent Labs   Lab Test 03/05/18  1044 05/25/17  1248   HBCAB Nonreactive  --    HEPBANG Nonreactive Nonreactive     Hepatitis C Testing     Hepatitis C Antibody   Date Value Ref Range Status   05/25/2017 (A) NR Final    Reactive   A reactive result indicates one of the following 1) current HCV infection 2)   past HCV infection that has resolved or 3) false positivity. The CDC recommends   that a reactive result should be followed by Nucleic acid testing for HCV RNA.  If HCV RNA is detected, that indicates current HCV infection. If HCV RNA is not   detected, that indicates either past, resolved HCV infection, or false HCV   antibody positivity.   Assay performance characteristics have not been established for newborns,   infants, and children     04/16/2010 Negative NEG Final     Respiratory Virus Testing    No results found for: RS, FLUAG
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
## 2020-10-05 NOTE — PHARMACY-VANCOMYCIN DOSING SERVICE
Pharmacy Vancomycin Initial Note  Date of Service 2020  Patient's  1985  35 year old, female    Indication: Skin and Soft Tissue Infection    Current estimated CrCl = CrCl cannot be calculated (Unknown ideal weight.). CrCl ~93ml/min using weight=70kg     Creatinine for last 3 days  10/4/2020:  2:05 PM Creatinine 0.58 mg/dL    Recent Vancomycin Level(s) for last 3 days  No results found for requested labs within last 72 hours.      Vancomycin IV Administrations (past 72 hours)                   vancomycin (VANCOCIN) 1,500 mg in sodium chloride 0.9 % 250 mL intermittent infusion (mg) 1,500 mg New Bag 10/04/20 1743                Nephrotoxins and other renal medications (From now, onward)    Start     Dose/Rate Route Frequency Ordered Stop    10/04/20 1927  ketorolac (TORADOL) injection 15 mg      15 mg Intravenous EVERY 6 HOURS PRN 10/04/20 1929 10/07/20 192          Contrast Orders - past 72 hours (72h ago, onward)    None                Plan:  1.  Start vancomycin  1500 mg IV q12h. Was given one dose of 1500mg in ED this evening.    2.  Goal Trough Level: 15-20 mg/L   3.  Pharmacy will check trough levels as appropriate in 1-3 Days.    4. Serum creatinine levels will be ordered daily for the first week of therapy and at least twice weekly for subsequent weeks.    5. Independence method utilized to dose vancomycin therapy: Method 2    Chandni Mcintosh RP

## 2020-10-05 NOTE — PLAN OF CARE
Pt up IND in room. Steady gait. R PIV infusing. Both hands are wrapped with kerlex from abscesses being drained in ED. Some drainage noted on both, and replaced with new kerlex. Abscess on L armpit, wounds on R elbow and L forearm monitoring. Multiple scabs all over body. Pt c/o pain and given toradol with some relief. Voids with urgency, pt states this happens on and off possibly linked to lifestyle per patient. LBM 10/3. Lungs clear. Slept for most of night. Refused vitals and COWS assessment overnight. Able to make needs known and call light within reach. Will continue to monitor and follow plan of care.     Refused lab draw in AM. Technician stated patient wanted to rest, will return in 1-2 hours to retry drawing labs.

## 2020-10-05 NOTE — PLAN OF CARE
Brief orthopedic note:    Discussed this patient with Dr. Rios of the emergency department.  In brief, Ms. Mackenzie is a 35-year-old female with a history of IV drug abuse and multiple abscesses that have developed.  The most significant of these is bilateral dorsal hand abscesses.  In addition, she has wounds of her right elbow and left forearm as well.    Pictures were visualized in Dr. Rios's note. Recommended bedside incision and drainage of bilateral dorsal hand abscesses as well as removal of rings, which per Dr. Rios's note was able to be successfully performed.    At this time, a formal orthopedic consultation is not necessary, as I expect these wounds to heal with IV antibiotics and decompression.      However, if at any point in time there is concern for septic arthritis, deep infection that may necessitate formal operative intervention, or development of compartment syndrome, please alert the orthopedic resident on call and we will evaluate the patient in person.    All Denney MD  Orthopaedic Surgery PGY-4  #: [phone]

## 2020-10-05 NOTE — UTILIZATION REVIEW
Admission Status; Secondary Review Determination    Under the authority of the Utilization Management Committee, the utilization review process indicated a secondary review on the above patient. The review outcome is based on review of the medical records, discussions with staff, and applying clinical experience noted on the date of the review.    (x) Inpatient Status Appropriate - This patient's medical care is consistent with medical management for inpatient care and reasonable inpatient medical practice.    RATIONALE FOR DETERMINATION: 35-year-old female with complex mental health and substance abuse disorder who presents to the hospital with acute cellulitis, left arm pit abscesses, urine drug screen positive for methamphetamines, heroin, cocaine and cannabis.  The complexity of patient's substance abuse as well as the severity of patient's acute cellulitis and infection with high risk for bacteremia is appropriate for inpatient management.    At the time of admission with the information available to the attending physician more than 2 nights Hospital complex care was anticipated, based on patient risk of adverse outcome if treated as outpatient and complex care required. Inpatient admission is appropriate based on the Medicare guidelines.    This document was produced using voice recognition software    The information on this document is developed by the utilization review team in order for the business office to ensure compliance. This only denotes the appropriateness of proper admission status and does not reflect the quality of care rendered.    The definitions of Inpatient Status and Observation Status used in making the determination above are those provided in the CMS Coverage Manual, Chapter 1 and Chapter 6, section 70.4.    Sincerely,    Francisco Javier Parekh MD  Utilization Review  Physician Advisor  [facility].
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
## 2020-10-05 NOTE — CONSULTS
[facility]  ORTHOPAEDIC SURGERY CONSULT - HISTORY AND PHYSICAL    DATE OF CONSULT: 10/5/2020 12:46 PM    REQUESTING PROVIDER: Farida Yuen MD - N Staff.    CC: hand abscesses    DATE OF INJURY: 10/4    HISTORY OF PRESENT ILLNESS:   Giselle Mackenzie is a 35 year old  female with PMH for cellulitis, asthma, GERD, PTSD, MRSA, OCD, bipolar disorder, ADD, EtOH and IV drug use who presents to the ED 10/4 with complaint of cellulitis - hands, arms, diarrhea and left armpit abscess that began over the last 1-2 weeks.Ortho consulted and recommended bedside incision and drainage of bilateral dorsal hand abscesses as well as removal of rings.     Pictures were visualized in ED note and it was determined that a formal orthopedic consultation was not necessary, as wounds expected to heal with IV antibiotics and decompression. Patient received IV ceftriaxone, vancomycin and Metronidazole in the ER. She was resumed on  IV vanc and ceftriaxone on admission. She was started on oral Tylenol and IV ketorolac for pain management. Ortho reconsulted today as the medicine team felt there was some mild fluctuance of abscesses and they would like us to formally evaluate patient to ensure this would be adequate definitive treatment.     Patient seen at bedside this evening and feels hands are significantly better. Reports the are only 1/3rd the size they had been yesterday on admission and they are much less painful. Patient very pleased with treatment course so far. Denies any fevers, chills, or new numbness or tingling.    PAST MEDICAL HISTORY:   Past Medical History:   Diagnosis Date     ADD (attention deficit disorder)      Asthma      Bipolar disorder, unspecified (H)      Cellulitis 2013     Cervical high risk HPV (human papillomavirus) test positive 12/07/2017 12/07/17: NIL pap, + HR HPV (not 16 or 18) result.      GERD (gastroesophageal reflux disease)      H/O ETOH abuse      H/O intravenous drug use in  "remission      OCD (obsessive compulsive disorder)      PTSD (post-traumatic stress disorder)        Patient denies any personal history of bleeding disorders, clotting disorders, or adverse reactions to anesthesia.    PAST SURGICAL HISTORY:   Past Surgical History:   Procedure Laterality Date     HAND SURGERY Right     MRSA abscess drainage     HC TOOTH EXTRACTION W/FORCEP       IRRIGATION AND DEBRIDEMENT BONE UPPER EXTREMITY, COMBINED Right 3/4/2019    Procedure: IRRIGATION AND DEBRIDEMENT RIGHT FOREARM ABSCESS;  Surgeon: Gideon Black MD;  Location:  OR       MEDICATIONS:   Prior to Admission medications    Not on File       ALLERGIES:   Sulfa drugs, Latex, Nickel, and Suboxone    SOCIAL HISTORY:   Living situation: Patient currently homeless.  Occupation: unemployed  Tobacco: 1.5ppd  Alcohol: occasional  Illicit Drugs: heroin and crack every day, last used Saturday morning. Does not share needles.    FAMILY HISTORY:  Patient denies known family history of bleeding, clotting, or anesthesia related complications.     REVIEW OF SYSTEMS:   Otherwise, a 10-point reviews of systems was negative except as noted above in the HPI.     PHYSICAL EXAM:   /77   Pulse 86   Temp 95.8  F (35.4  C) (Oral)   Resp 20   Ht 1.575 m (5' 2\")   Wt 70 kg (154 lb 6.4 oz)   SpO2 98%   BMI 28.24 kg/m    General: Awake, alert, cooperative, no apparent distress, appears stated age  HEENT: Normocephalic, atraumatic  Respiratory: Breathing non-labored, no wheezing  Cardiovascular: Peripheral pulses regular rate  Neurological: A&Ox3, CN II-XII grossly intact    Musculoskeletal:  Right Upper Extremity: No deformity. Abscess over antecubital fossa near IV and another over dorsum of hand. + purulent drainage. + erythema. + swelling but improved from yesterday. Slightly TTP over abscesses. Normal ROM wrist without pain. No pain with passive stretch wrist flexors. Motor intact distally with finger " flexion/extension/intrinsics/EPL. SILT m/r/u nerve distributions. Radial pulse palpable, 2+.   Left Upper Extremity: No deformity. Abscess over over dorsum of hand. + purulent drainage. + erythema. + swelling but improved from yesterday. Slightly TTP over abscesses. Normal ROM wrist without pain. No pain with passive stretch wrist flexors.  Motor intact distally with finger flexion/extension/intrinsics/EPL. SILT m/r/u nerve distributions. Radial pulse palpable, 2+.       LABS:  CBC:  Lab Results   Component Value Date    WBC 7.9 10/05/2020    HGB 12.0 10/05/2020     10/05/2020     BMP:  Lab Results   Component Value Date     10/05/2020    POTASSIUM 4.4 10/05/2020    CHLORIDE 112 (H) 10/05/2020    CO2 21 10/05/2020    BUN 8 10/05/2020    CR 0.55 10/05/2020    ANIONGAP 4 10/05/2020    BRYANT 7.8 (L) 10/05/2020     (H) 10/05/2020     Inflammatory Markers:  Lab Results   Component Value Date    WBC 7.9 10/05/2020    WBC 17.9 (H) 10/04/2020    WBC 7.8 03/05/2019    .0 (H) 10/05/2020    .0 (H) 10/04/2020    CRP 21.3 (H) 02/27/2019    SED 38 (H) 10/05/2020    SED 33 (H) 10/04/2020    SED 10 02/27/2019       INR   Date Value Ref Range Status   03/05/2018 0.86 0.86 - 1.14 Final     Glucose   Date Value Ref Range Status   10/05/2020 101 (H) 70 - 99 mg/dL Final       IMAGING:  Review of imaging of hands demonstrate soft tissue swelling but no bony abnormalities.    ASSESSMENT AND PLAN:   Giselle Mackenzie is a 35 year old dominant female hxbipolar disorder and  EtOH and IV drug use with multiple abscesses over hands, forearms, axilla, and legs. Bedside irrigation and debridement performed in ED and patient initiated on IV abx. Patient clinically improving and inflammatory markers trending down since initiation of these treatments. No current plans for OR for formal I&D abscesses given response to more conservative treatment.    Recommendations:  - Begin hand kwswg1s/ day  - Continue abx per  primary  - WOC consult for further recommendations regarding wound care  - Pain control per primary  - Diet: Regular from orthopedic standpoint  - Chemical dependency service on board  - Orthopedics to continue to follow patient. Please contact with any question or concerns or if abscesses begin to worsen, if there is sudden increase in pain in hands, or inflammatory markers trend upward   - Follow-up: TBD  - Disposition: per primary      Assessment and Plan discussed with on call senior resident, Dajuan Alexandra MD  Orthopaedic Surgery Resident, PGY-1  Pager: [phone]

## 2020-10-05 NOTE — H&P
[facility]     History and Physical - Hospitalist Service       Date of Admission:  10/4/2020    Assessment & Plan   Giselle Mackenzie is a 35 year old female admitted on 10/4/2020. She PMH for cellulitis, asthma, GERD, PTSD, MRSA, OCD, bipolar disorder, ADD, EtOH and IV drug use who presents to the ED with complaint of cellulitis - hands, arms, diarrhea and left armpit abscess.  She is S/P I&D of both hand abscesses ( Dorsum) in the ED by ED provider as advised by orthopedic team.  Individual problems and their management are outlined below    Multiple bilateral upper extremity abscess:  Cause of abscesses being IVDU.  2 large abscesses on the dorsum of both hands have already been incised and drained. She has multiple other abscesses in evolution at various stages in both her arms ( please see separate picture from ER provider note)   More importantly, patient is also at risk for bacteremia  Await both I&D and blood cultures for now   patient received IV ceftriaxone, vancomycin and Metronidazole in the ER. Resume IV vanc and ceftriaxone for now  Pain control with oral Tylenol and IV ketorolac   ID consult after confirming culture sensitivity to determine duration of treatment  Orthopedic team is aware of the patient, but felt comfortable with ER to perform I&D. Please see separate orthopedic note      # H/O IVDU  IV drug of choice is IV heroin and Methamphetamine  Urine analysis is positive amphetamine, Cocaine, opiates and Cannabinoids   Likely will go through severe withdrawals    COWS monitoring  Methadone 30-20-10  Psychiatry consult to help with with withdrawals   Patient wishes to pursue treatment. However patient does not have insurance  SW consult to pursue with application for appropriate financial assistance         # Tobacco abuse:  Patient smokes about 2-3 pcs of cigarettes/ day  Continue Patch + gum for now       #GERD  Omeprazole 40 me every morning        #PTSD, OCD, Bipolar disorder and ADD  Currently not on any medications      Diet: Combination Diet Regular Diet Adult    DVT Prophylaxis: Enoxaparin (Lovenox) SQ  Pablo Catheter: not present  Code Status: Full Code           Disposition Plan   Expected discharge: 3-5 days , recommended to treatment facility  once antibiotic plan established.  Entered: Leeroy Bailey MD 10/04/2020, 7:30 PM     The patient's care was discussed with the Patient.    Leeroy Bailey MD  [facility]   Contact information available via [facility] Paging/Directory      ______________________________________________________________________    Chief Complaint   Multiple abscesses in both upper extremities     History is obtained from the patient    History of Present Illness   Giselle Mackenzie is a 35 year old female with a PMH for cellulitis, asthma, GERD, PTSD, MRSA, OCD, bipolar disorder, ADD, EtOH and IV drug use who presents to the ED with complaint of cellulitis - hands, arms, diarrhea and left armpit abscess.     Patient reports her symptoms began over the last week. She notes her right hand is swollen, reddening and warm to the touch. She is unable to move her fingers due to the swelling. Patient has 2 similar raised red sites on her right arm, corresponding to prior injection sites but none on her right hand. Patient's left hand is swollen, with similar redness and warmth but not to the same degree as the right hand.  She notes last using heroin yesterday and has been slowly tapering off.  Reports she typically uses half a gram every 2 to 3 days.  She notes feeling feverish and nauseous since onset of her symptoms. Patient denies any episodes of emesis and has been eating normally.    Patient has had similar abscess in the right hand, which was incised and drained at [facility] in feb 2019.  At that time, she presented with right forearm  cellulitis, which progressed to deep abscess and required irrigation and debridement in the operating room    Patient reports having a left armpit abscess that has an infection. She notes having chronic low back pain.   Patient reports that she goes through bad withdrawals.  She typically uses IV heroin and IV methamphetamine.  She expressed that she is homeless and generally lives in hotels.  She works as an escort, and smokes about 2 to 3 packets of cigarettes a day.    She really wants to give up this lifestyle and clean up.  She is willing to pursue treatment at this time.  Currently she is not on any medication for her psychiatric illness    Review of Systems    The 10 point Review of Systems is negative other than noted in the HPI or here.     Past Medical History    I have reviewed this patient's medical history and updated it with pertinent information if needed.   Past Medical History:   Diagnosis Date     ADD (attention deficit disorder)      Asthma      Bipolar disorder, unspecified (H)      Cellulitis 2013     Cervical high risk HPV (human papillomavirus) test positive 12/07/2017 12/07/17: NIL pap, + HR HPV (not 16 or 18) result.      GERD (gastroesophageal reflux disease)      H/O ETOH abuse      H/O intravenous drug use in remission      OCD (obsessive compulsive disorder)      PTSD (post-traumatic stress disorder)        Past Surgical History   I have reviewed this patient's surgical history and updated it with pertinent information if needed.  Past Surgical History:   Procedure Laterality Date     HAND SURGERY Right     MRSA abscess drainage     HC TOOTH EXTRACTION W/FORCEP       IRRIGATION AND DEBRIDEMENT BONE UPPER EXTREMITY, COMBINED Right 3/4/2019    Procedure: IRRIGATION AND DEBRIDEMENT RIGHT FOREARM ABSCESS;  Surgeon: Gideon Black MD;  Location:  OR       Social History   I have reviewed this patient's social history and updated it with pertinent information if needed.  Social  "History     Tobacco Use     Smoking status: Current Every Day Smoker     Packs/day: 2.00     Smokeless tobacco: Never Used   Substance Use Topics     Alcohol use: No     Drug use: Yes     Types: Marijuana, Methamphetamines, Cocaine, IV, \"Crack\" cocaine       Family History   I have reviewed this patient's family history and updated it with pertinent information if needed.  Family History   Problem Relation Age of Onset     Diabetes Mother      Cervical Cancer Maternal Grandmother      Cerebrovascular Disease Maternal Grandfather      Alcoholism Paternal Uncle      Cirrhosis Paternal Uncle      Alcoholism Paternal Uncle      Cirrhosis Paternal Uncle      Migraines Paternal Grandfather      Coronary Artery Disease Early Onset Paternal Grandfather 55     Colon Cancer No family hx of        Prior to Admission Medications   None     Allergies   Allergies   Allergen Reactions     Sulfa Drugs Anaphylaxis     Latex Hives     Nickel Hives     Suboxone      fainting       Physical Exam   Vital Signs: Temp: 98.4  F (36.9  C) Temp src: Oral BP: 112/62 Pulse: 116   Resp: 16 SpO2: 98 % O2 Device: None (Room air)    Weight: 0 lbs 0 oz    Constitutional: awake, alert, cooperative, mild distress and appears stated age  Eyes: Lids and lashes normal, pupils equal, round and reactive to light, extra ocular muscles intact, sclera clear, conjunctiva normal  ENT: Normocephalic, without obvious abnormality, atraumatic, sinuses nontender on palpation, external ears without lesions, oral pharynx with moist mucous membranes, tonsils without erythema or exudates, gums normal and good dentition.  Respiratory: No increased work of breathing, good air exchange, clear to auscultation bilaterally, no crackles or wheezing  Cardiovascular: Normal apical impulse, regular rate and rhythm, normal S1 and S2, no S3 or S4, and no murmur noted  GI: No scars, normal bowel sounds, soft, non-distended, non-tender, no masses palpated, no " hepatosplenomegally  Skin: Multiple areas of erythema around needle tracks   Musculoskeletal: 2 large abscesses on the dorsum of both hands. Multiple abscesses in both arms at various stages. Please see ER provider note for pictures   Neurologic: Awake, alert, oriented to name, place and time.  Cranial nerves II-XII are grossly intact.  Motor is 5 out of 5 bilaterally.  Cerebellar finger to nose, heel to shin intact.  Sensory is intact.  Babinski down going, Romberg negative, and gait is normal.    Data   Data reviewed today: I reviewed all medications, new labs and imaging results over the last 24 hours. I personally reviewed no images or EKG's today.    Recent Labs   Lab 10/04/20  1405   WBC 17.9*   HGB 13.4   MCV 87         POTASSIUM 4.8   CHLORIDE 102   CO2 25   BUN 10   CR 0.58   ANIONGAP 8   BRYANT 8.4*   GLC 79   ALBUMIN 3.4   PROTTOTAL 7.7   BILITOTAL 0.5   ALKPHOS 89   ALT 79*   AST 42     No results found for this or any previous visit (from the past 24 hour(s)).

## 2020-10-05 NOTE — PROGRESS NOTES
1600-Lab unable to obtain blood after 1 stick,  Pt declined 2nd attempt, declined try in foot.  Lab will see if can do lab add- on from earlier.     Pt very sleepy, very slow to arouse,  Did arouse, states no sleep x 4 days before admission.    Denies taking other drugs  Sat 98% room air at sleep  Going to order dinner      1800- sleepy but arouses easier  Lab unable to do any of the new orders as add on.    Pt declines another attempt this sravan,   Will plan to do in am

## 2020-10-05 NOTE — PLAN OF CARE
Resting quietly in bed.  Reported she felt that she slept really well.  Falls asleep when she is not being engaged in conversation.  IVF continue to infuse.     1200:  Ate part of the late breakfast she ordered, and then fell asleep, again.       1500:  Had good intake from lunch tray, but again is very sleepy. Is alert when being spoken to, and speech is clear.  But, again, when not being stimulated, she dozes right off.  She was on the phone with hospital financial issues person, and kept falling asleep.  This happened multiple times.  They will try and call her tomorrow morning, again.  She is apologetic about falling asleep, but says that she hadn't been sleeping in the days before coming in.

## 2020-10-06 LAB
BACTERIA SPEC CULT: ABNORMAL
Lab: ABNORMAL
SPECIMEN SOURCE: ABNORMAL
VANCOMYCIN SERPL-MCNC: 8.1 MG/L

## 2020-10-06 PROCEDURE — G0463 HOSPITAL OUTPT CLINIC VISIT: HCPCS | Mod: 25

## 2020-10-06 PROCEDURE — 99207 PR CDG-EXAM COMPONENT: MEETS COMPREHENSIVE - UP CODED: CPT | Performed by: HOSPITALIST

## 2020-10-06 PROCEDURE — 97602 WOUND(S) CARE NON-SELECTIVE: CPT

## 2020-10-06 PROCEDURE — 250N000011 HC RX IP 250 OP 636: Performed by: INTERNAL MEDICINE

## 2020-10-06 PROCEDURE — 80202 ASSAY OF VANCOMYCIN: CPT | Performed by: INTERNAL MEDICINE

## 2020-10-06 PROCEDURE — 99232 SBSQ HOSP IP/OBS MODERATE 35: CPT | Performed by: HOSPITALIST

## 2020-10-06 PROCEDURE — 250N000013 HC RX MED GY IP 250 OP 250 PS 637: Performed by: NURSE PRACTITIONER

## 2020-10-06 PROCEDURE — 36416 COLLJ CAPILLARY BLOOD SPEC: CPT | Performed by: INTERNAL MEDICINE

## 2020-10-06 PROCEDURE — 96372 THER/PROPH/DIAG INJ SC/IM: CPT | Performed by: INTERNAL MEDICINE

## 2020-10-06 PROCEDURE — 258N000003 HC RX IP 258 OP 636: Performed by: INTERNAL MEDICINE

## 2020-10-06 PROCEDURE — 120N000002 HC R&B MED SURG/OB UMMC

## 2020-10-06 PROCEDURE — 99232 SBSQ HOSP IP/OBS MODERATE 35: CPT | Performed by: INTERNAL MEDICINE

## 2020-10-06 PROCEDURE — 250N000013 HC RX MED GY IP 250 OP 250 PS 637: Performed by: INTERNAL MEDICINE

## 2020-10-06 RX ADMIN — TRAZODONE HYDROCHLORIDE 50 MG: 50 TABLET ORAL at 21:56

## 2020-10-06 RX ADMIN — VANCOMYCIN HYDROCHLORIDE 1500 MG: 1 INJECTION, POWDER, LYOPHILIZED, FOR SOLUTION INTRAVENOUS at 19:14

## 2020-10-06 RX ADMIN — ENOXAPARIN SODIUM 40 MG: 40 INJECTION SUBCUTANEOUS at 19:15

## 2020-10-06 RX ADMIN — VANCOMYCIN HYDROCHLORIDE 1500 MG: 1 INJECTION, POWDER, LYOPHILIZED, FOR SOLUTION INTRAVENOUS at 08:35

## 2020-10-06 RX ADMIN — AMPICILLIN SODIUM AND SULBACTAM SODIUM 3 G: 2; 1 INJECTION, POWDER, FOR SOLUTION INTRAMUSCULAR; INTRAVENOUS at 23:50

## 2020-10-06 RX ADMIN — ESCITALOPRAM OXALATE 10 MG: 10 TABLET, FILM COATED ORAL at 10:05

## 2020-10-06 RX ADMIN — QUETIAPINE FUMARATE 25 MG: 25 TABLET ORAL at 21:56

## 2020-10-06 RX ADMIN — METHADONE HYDROCHLORIDE 10 MG: 5 SOLUTION ORAL at 19:15

## 2020-10-06 RX ADMIN — AMPICILLIN SODIUM AND SULBACTAM SODIUM 3 G: 2; 1 INJECTION, POWDER, FOR SOLUTION INTRAMUSCULAR; INTRAVENOUS at 02:16

## 2020-10-06 RX ADMIN — OMEPRAZOLE 40 MG: 20 CAPSULE, DELAYED RELEASE ORAL at 10:05

## 2020-10-06 RX ADMIN — AMPICILLIN SODIUM AND SULBACTAM SODIUM 3 G: 2; 1 INJECTION, POWDER, FOR SOLUTION INTRAMUSCULAR; INTRAVENOUS at 17:24

## 2020-10-06 RX ADMIN — AMPICILLIN SODIUM AND SULBACTAM SODIUM 3 G: 2; 1 INJECTION, POWDER, FOR SOLUTION INTRAMUSCULAR; INTRAVENOUS at 12:00

## 2020-10-06 NOTE — PHARMACY-VANCOMYCIN DOSING SERVICE
Pharmacy Vancomycin Note  Date of Service 2020  Patient's  1985   35 year old, female    Indication: Multiple soft tissue abscesses s/p I/D of bilateral dorsal hands  Wound culture: 10/4 Moderate growth beta hemolytic Streptococcus group A  Goal Trough Level: 10-15 mg/L (Note: Goal is changed for no MRSA isolated from the culture)  Day of Therapy: started on 10/4/2020  Current Vancomycin regimen:  1500 mg IV q12h, Patient is also on Ampicillin-sulbactam.    Current estimated CrCl = Estimated Creatinine Clearance: 129.7 mL/min (based on SCr of 0.55 mg/dL).    Creatinine for last 3 days  10/4/2020:  2:05 PM Creatinine 0.58 mg/dL;  7:57 PM Creatinine 0.77 mg/dL  10/5/2020:  8:34 AM Creatinine 0.55 mg/dL    Recent Vancomycin Levels (past 3 days)  10/6/2020:  5:28 AM Vancomycin Level 8.1 mg/L (~11.5 hr post dose on q12h frequency)    Vancomycin IV Administrations (past 72 hours)                   vancomycin (VANCOCIN) 1,500 mg in sodium chloride 0.9 % 250 mL intermittent infusion (mg) 1,500 mg New Bag 10/05/20 1754     1,500 mg New Bag  0536    vancomycin (VANCOCIN) 1,500 mg in sodium chloride 0.9 % 250 mL intermittent infusion (mg) 1,500 mg New Bag 10/04/20 1743                Nephrotoxins and other renal medications (From now, onward)    Start     Dose/Rate Route Frequency Ordered Stop    10/05/20 1230  ampicillin-sulbactam (UNASYN) 3 g vial to attach to  mL bag      3 g  over 1 Hours Intravenous EVERY 6 HOURS 10/05/20 1229      10/05/20 0600  vancomycin (VANCOCIN) 1,500 mg in sodium chloride 0.9 % 250 mL intermittent infusion      1,500 mg  over 90 Minutes Intravenous EVERY 12 HOURS 10/04/20 2003      10/04/20 192  ketorolac (TORADOL) injection 15 mg      15 mg Intravenous EVERY 6 HOURS PRN 10/04/20 1929 10/07/20 192             Contrast Orders - past 72 hours (72h ago, onward)    None          Interpretation of levels and current regimen:  Trough level is  Subtherapeutic. However, the level  was drawn after 3 doses of vancomycin were given.     Has serum creatinine changed > 50% in last 72 hours: No    Urine output:  Good urine output    Renal Function: Stable    Plan:  1.  Continue Current Dose for now.   2.  Pharmacy will check trough levels as appropriate in 1-3 Days.    3. Serum creatinine levels will be ordered a minimum of twice weekly.      Tin Franklin RPH        .

## 2020-10-06 NOTE — PROGRESS NOTES
[facility], [place], [place]  Internal Medicine Progress Note      Assessment and Plans:     Giselle Mackenzie is a 35 year old female who was admitted on 10/4/2020.     35 year old female admitted on 10/4/2020. She PMH for cellulitis, asthma, GERD, PTSD, MRSA, OCD, bipolar disorder, ADD, EtOH and IV drug use who presents to the ED with complaint of cellulitis - hands, arms, diarrhea and left armpit abscess.    She is S/P I&D of both hand abscesses ( Dorsum) in the ED by ED provider as advised by orthopedic team.    Individual problems and their management are outlined below    # Multiple bilateral upper extremity abscess:    Cause of abscesses being IVDU.2 large abscesses on the dorsum of both hands now s/p  I & D on 10/04 in ED. She has multiple other abscesses in evolution at various stages in both her arms ( please see separate picture from ER provider note). More importantly, patient is also at risk for bacteremia. Wound culture and blood cultures were sent. Patient received IV ceftriaxone, vancomycin and Metronidazole in the ER. She was resumed on  IV vanc and ceftriaxone on admission. She was started on oral Tylenol and IV ketorolac for pain management.     - Continue Vancomycin, Unasyn  - Follow wound culture data  - Seen by ID and Ortho. No plan of OR per ortho  - Pain control      # H/O IVDU    IV drug of choice is IV heroin and Methamphetamine  Urine analysis is positive amphetamine, Cocaine, opiates and Cannabinoids    Likely will go through severe withdrawals    COWS monitoring   Methadone 30-20-10 started  Psychiatry consulted  Patient wishes to pursue treatment. However patient does not have insurance    SW consult to pursue with application for appropriate financial assistance      # Tobacco abuse:  Patient smokes about 2-3 pcs of cigarettes/ day  Continue Patch + gum for now      #GERD  Omeprazole 40 me every morning   #PTSD,  OCD, Bipolar disorder and ADD  Currently not on any medications     Diet: Combination Diet Regular Diet Adult    DVT Prophylaxis: Enoxaparin (Lovenox) SQ  Pablo Catheter: not present  Code Status: Full Code        Gregg Anthony MD  Text Page  (7am - 5pm, M-F)    Subjective:          Overnight Events reviewed, Chart Reviewed  Sleeping most of the time. Did not want to answer my questions.    Hand infections some what better per reports.     -Data reviewed today: I reviewed all new labs and imaging results over the last 24 hours.     Exam:         Temp: 97.2  F (36.2  C) Temp src: Oral BP: 106/60 Pulse: 74   Resp: 20 SpO2: 98 % O2 Device: None (Room air)    Vitals:    10/04/20 2043   Weight: 70 kg (154 lb 6.4 oz)     Vital Signs with Ranges  Temp:  [97.2  F (36.2  C)-97.6  F (36.4  C)] 97.2  F (36.2  C)  Pulse:  [74] 74  Resp:  [16-20] 20  BP: (106-109)/(60-65) 106/60  SpO2:  [98 %] 98 %  I/O last 3 completed shifts:  In: 980 [P.O.:980]  Out: 2700 [Urine:2700]    Constitutional:  sleeping most of the time during interview. Respiratory: AE is goog on both sides, no wheezing onr crackles  Cardiovascular: S1S2 normal, no new murmur  GI: Soft, non tender  Skin/Integumen: BL hand infections and abscesses noted      Medications       ampicillin-sulbactam (UNASYN) IV  3 g Intravenous Q6H     enoxaparin ANTICOAGULANT  40 mg Subcutaneous Q24H     escitalopram  10 mg Oral Daily     methadone  10 mg Oral Once     omeprazole  40 mg Oral QAM AC     QUEtiapine  25 mg Oral At Bedtime     sodium chloride (PF)  3 mL Intracatheter Q8H     traZODone  50 mg Oral At Bedtime     vancomycin (VANCOCIN) IV  1,500 mg Intravenous Q12H       Data   Recent Labs   Lab 10/05/20  0834 10/04/20  1957 10/04/20  1405   WBC 7.9  --  17.9*   HGB 12.0  --  13.4   MCV 92  --  87     --  419     --  135   POTASSIUM 4.4  --  4.8   CHLORIDE 112*  --  102   CO2 21  --  25   BUN 8  --  10   CR 0.55 0.77 0.58   ANIONGAP 4  --  8   BRYANT 7.8*  --   8.4*   *  --  79   ALBUMIN 2.4*  --  3.4   PROTTOTAL 6.6*  --  7.7   BILITOTAL 0.3  --  0.5   ALKPHOS 65  --  89   ALT 65*  --  79*   AST 38  --  42       No results found for this or any previous visit (from the past 24 hour(s)).

## 2020-10-06 NOTE — PROGRESS NOTES
Orthopaedic Surgery Progress Note 10/06/2020    S: No acute events overnight.  Pain controlled on current regimen. Patient very tired this morning and minimally responsive. No changes since last night. Still feels she is improving with only antibiotic treatment. Denies numbness or tingling.    O:  Temp: 97.6  F (36.4  C) Temp src: Oral BP: 109/65 Pulse: 74   Resp: 16 SpO2: 98 % O2 Device: None (Room air)      Exam:  Gen: No acute distress, resting comfortably in bed, fatigued.  Resp: Non-labored breathing  MSK:  Right Upper Extremity: No deformity.  Abscess over antecubital fossa near IV and another over dorsum of hand. Dressings CDI. + erythema. + swelling. Unchanged since seen last night. Slightly TTP over abscesses. Normal ROM wrist without pain. No pain with passive stretch wrist flexors. Motor intact distally with finger flexion/extension/intrinsics/EPL. SILT m/r/u nerve distributions. Radial pulse palpable, 2+.   Left Upper Extremity: No deformity. Abscess over over dorsum of hand. Dressings CDI. + erythema. + swelling Unchanged since seen last night.. Slightly TTP over abscess. Normal ROM wrist without pain. No pain with passive stretch wrist flexors.  Motor intact distally with finger flexion/extension/intrinsics/EPL. SILT m/r/u nerve distributions. Radial pulse palpable, 2+.    Recent Labs   Lab 10/05/20  0834 10/04/20  1405   WBC 7.9 17.9*   HGB 12.0 13.4    419   .0* 150.0*       Culture results: Gram positive cocci in pairs and chains, preliminary culture resuts beta hemolytic GAS susceptible to amp, PCN, vanc, and cephalosporins.    ASSESSMENT AND PLAN:   Giselle Mackenzie is a 35 year old dominant female hxbipolar disorder and  EtOH and IV drug use with multiple abscesses over hands, forearms, axilla, and legs. Bedside irrigation and debridement performed in ED and patient initiated on IV abx. Patient clinically improving and inflammatory markers trending down since initiation of these  treatments. No current plans for OR for formal I&D abscesses given response to more conservative treatment.     Recommendations:  - Begin hand [id number]/ day  - Continue abx per primary  - WOC consult for further recommendations regarding wound care  - Pain control per primary  - Diet: Regular from orthopedic standpoint  - Chemical dependency service on board  - Orthopedics to continue to follow patient. Please contact with any question or concerns or if abscesses begin to worsen, if there is sudden increase in pain in hands, or inflammatory markers trend upward   - Follow-up: TBD  - Disposition: per primary      Rey Alexandra MD  Orthopaedic Surgery, PGY-1  Pager: [phone]

## 2020-10-06 NOTE — PLAN OF CARE
Nurisng   infection/sleepiness  S- pt very sleepy most of shift, at times very hard to arouse, takes a while.  States had not slept pst 4 days pre adimit as reason for being so tired,  denies taking any other drugs here.   Late sravan states feeling tired because of withdrawls  states methodone helps some.  Some more awake times later sravan,  ate dinner.  Feeling hungry at HS. Snack given.  Traxadone given at HS per pt request.  Held seroquel since so sleepy most of shift.     Ortho came , unwrapped biltat hand drsgs.    Pt states feeling and looking better, able to use hands better.  Still edema. Still dk pink, scant serosang drainage.  Soaked hands in warm water per DR canas.    Rewrapped w adaptic then kerlix.  Rt anticub area also red w small drainage near IV site- dressed w adaptic and gauze.  L axilla has open areas, pink no drainage noted, no drsg on.   Plan WOC to see tomorrow    A- very sleepy.  Sl better in late sravan.  Wounds improving slightly per pt   P- soak qid per MD suggestion.  Monitor for signs pt taking other drugs.

## 2020-10-06 NOTE — PLAN OF CARE
"  VS:    /60 (BP Location: Left arm)   Pulse 74   Temp 97.2  F (36.2  C) (Oral)   Resp 20   Ht 1.575 m (5' 2\")   Wt 70 kg (154 lb 6.4 oz)   SpO2 98%   BMI 28.24 kg/m  VSS. No signs of SOB, CP. No Dizziness reported. LS clear equal bilaterally, on RA   Output:    Voids spontaneously w/out difficulty. Last BM 10/5/20, passing gas     Activity:    Independent    Skin: Wounds & scabs on Bilateral hands & L axilla    Pain:    Denies    Neuro/CMS:    Intact    Dressing(s):    Bilateral hands: CDI, daily dressing change completed    Diet:    Regular    Equipment:        IV's/Drains:    PIV R arm: infusing NS TKO   Plan:    Continue to monitor    Additional Info:    Pt lethargic most of the day. Arousal to touch and voice. Wanted to sleep most of the time - able to answer some questions.  Refused labs              "

## 2020-10-06 NOTE — CONSULTS
[facility] Nurse Inpatient Wound Assessment   Reason for consultation: Evaluate and treat wounds on arms, hands and axilla    Assessment  Arms, hands and axilla wounds due to abscesses from IVDU  Status: initial assessment    Treatment Plan  Axilla wounds: Daily : Apply Vashe (order #[id number]) soaked gauze to axilla (wounds and intact skin) and leave for 10 minutes. Do not rinse.  Bilateral arm and hand wounds: Apply Vashe (order #[id number]) soaked gauze to open areas and intact abscesses and leave for 10 minutes. Do not rinse. Cover hand abscesses and Right AC wounds with Xeroform (order #[id number]). Secure with Kerlix.   Orders Written  Recommended provider order: None, at this time  WO Nurse follow-up plan:weekly  Nursing to notify the Provider(s) and re-consult the WO Nurse if wound(s) deteriorates or new skin concern.    Patient History  According to provider note(s):  Per Dr Rey Alexandra on 10/6/2020: Giselle Mackenzie is a 35 year old dominant female hxbipolar disorder and  EtOH and IV drug use with multiple abscesses over hands, forearms, axilla, and legs. Bedside irrigation and debridement performed in ED and patient initiated on IV abx. Patient clinically improving and inflammatory markers trending down since initiation of these treatments. No current plans for OR for formal I&D abscesses given response to more conservative treatment.    Objective Data  Containment of urine/stool: Continent of bladder and Continent of bowel    Active Diet Order  Orders Placed This Encounter      Combination Diet Regular Diet Adult      Output:   I/O last 3 completed shifts:  In: 1730 [P.O.:980; I.V.:750]  Out: 2250 [Urine:2250]    Risk Assessment:   Sensory Perception: 4-->no impairment  Moisture: 4-->rarely moist  Activity: 3-->walks occasionally  Mobility: 4-->no limitation  Nutrition: 3-->adequate  Friction and Shear: 3-->no apparent problem  Cleveland Score: 21                          Labs:   Recent Labs   Lab 10/05/20  0849    ALBUMIN 2.4*   HGB 12.0   WBC 7.9   .0*       Physical Exam  Areas of skin assessed: focused bilateral arms, hands and axilla    Wound Location:  Right hand    10/6 Right hand    Date of last photo 10/6/2020  Wound History: Hx IVDU, abscess    Wound Base: 100 % intact epidermis with firm abscess under the skin. Epidermis beginning to slough away but not open on assessment today.      Palpation of the wound bed: firm      Drainage: scant     Description of drainage: serous     Measurements (length x width x depth, in cm) not measured today     Tunneling N/A     Undermining N/A  Periwound skin: edematous and erythema- blanchable      Color: pink      Temperature: normal   Odor: none  Pain: moderate, burning  Pain intervention prior to dressing change: none     Wound Location:  Left hand    10/6 Left hand    Date of last photo 10/6/2020  Wound History: Hx IVDU    Wound Base: 100 % thin scabbing     Palpation of the wound bed: firm      Drainage: none     Description of drainage: none     Measurements (length x width x depth, in cm) 2  x 1  x  0 cm      Tunneling N/A     Undermining N/A  Periwound skin: edematous and indurated      Color: red      Temperature: warm  Odor: none  Pain: moderate, burning  Pain intervention prior to dressing change: none    Wound Location:  Right AC    10/6 Right AC    Date of last photo 10/6/2020  Wound History: Hx IVDU    Wound Base: 100 % slough     Palpation of the wound bed: firm      Drainage: scant     Description of drainage: cloudy and yellow     Measurements (length x width x depth, in cm) 0.5  x 0.5  x  0.3 cm      Tunneling N/A     Undermining N/A  Periwound skin: edematous and indurated      Color: red      Temperature: warm  Odor: none  Pain: moderate, burning  Pain intervention prior to dressing change: none    Wound Location: Left axilla    10/6 Left axilla    Date of last photo 10/6/2020  Wound History: Hx IVDU    Wound Base: 100 % slough     Palpation of the wound  bed: firm      Drainage: scant     Description of drainage: cloudy and yellow     Measurements (length x width x depth, in cm) scattered wounds     Tunneling N/A     Undermining N/A  Periwound skin: edematous and indurated      Color: red      Temperature: warm  Odor: none  Pain: moderate, burning  Pain intervention prior to dressing change: none      Interventions  Visual inspection and assessment completed   Wound Care Rationale Promote moist wound healing without tissue dehydration , Provide selective debridement (autolysis) of nonviable tissue  and Decrease bacterial load  Wound Care: completed by RN  Supplies: ordered: Vashe and Xeroform  Current off-loading measures: Pillows  Current support surface: Standard  Atmos Air mattress  Education provided to: plan of care, wound progress and Infection prevention   Discussed plan of care with Patient and Nurse    Sammi Pace RN, CWOCN

## 2020-10-06 NOTE — PLAN OF CARE
"/65 (BP Location: Left arm)   Pulse 74   Temp 97.6  F (36.4  C) (Oral)   Resp 16   Ht 1.575 m (5' 2\")   Wt 70 kg (154 lb 6.4 oz)   SpO2 98%   BMI 28.24 kg/m       Pt is up IND in room, Voids spontaneously without difficulty. Cellulitis on bilat hands and r forearm. Pt slept all night. Dressings to hands/forearm intact. No complaints of pain/nausea/difficulty breathing. Pt arouses to gentle shaking, but sleeps heavily otherwise. Pt initially refused labs completely in am, but did agree to finger poke for vanco level. IV Unasyn infused per order. Awaiting vanco blood level to start infusion. Pt is able to make needs known and call light is in reach. Will continue current POC.     Addendum: At 0645 when writer entered room, IV pump was turned off. Pt stated she turned the pump off (without alerting staff) due to occlusion and the sound was annoying her. Pt was refusing to be assessed for IV  Day shift to infuse next vancomycin dose when pt can agree to let nurse assess PIV status.     "

## 2020-10-06 NOTE — CONSULTS
10/05 CD consult acknowledged  Expect consult to be completed by Wednesday 10/07.    Angeles Chandler, [facility]  [phone]

## 2020-10-07 LAB
ANION GAP SERPL CALCULATED.3IONS-SCNC: 6 MMOL/L (ref 3–14)
BUN SERPL-MCNC: 11 MG/DL (ref 7–30)
CALCIUM SERPL-MCNC: 9.1 MG/DL (ref 8.5–10.1)
CHLORIDE SERPL-SCNC: 107 MMOL/L (ref 94–109)
CO2 SERPL-SCNC: 25 MMOL/L (ref 20–32)
CREAT SERPL-MCNC: 0.65 MG/DL (ref 0.52–1.04)
CRP SERPL-MCNC: 35 MG/L (ref 0–8)
ERYTHROCYTE [DISTWIDTH] IN BLOOD BY AUTOMATED COUNT: 14.1 % (ref 10–15)
GFR SERPL CREATININE-BSD FRML MDRD: >90 ML/MIN/{1.73_M2}
GLUCOSE SERPL-MCNC: 110 MG/DL (ref 70–99)
HBV SURFACE AG SERPL QL IA: NONREACTIVE
HCT VFR BLD AUTO: 46.5 % (ref 35–47)
HGB BLD-MCNC: 15.1 G/DL (ref 11.7–15.7)
HIV 1+2 AB+HIV1 P24 AG SERPL QL IA: NONREACTIVE
HIV1+2 AB SPEC QL IA.RAPID: NORMAL
MCH RBC QN AUTO: 28.4 PG (ref 26.5–33)
MCHC RBC AUTO-ENTMCNC: 32.5 G/DL (ref 31.5–36.5)
MCV RBC AUTO: 88 FL (ref 78–100)
PLATELET # BLD AUTO: 432 10E9/L (ref 150–450)
POTASSIUM SERPL-SCNC: 4.1 MMOL/L (ref 3.4–5.3)
RBC # BLD AUTO: 5.31 10E12/L (ref 3.8–5.2)
RPR SER QL: NONREACTIVE
SODIUM SERPL-SCNC: 138 MMOL/L (ref 133–144)
T PALLIDUM AB SER QL: REACTIVE
WBC # BLD AUTO: 9.1 10E9/L (ref 4–11)

## 2020-10-07 PROCEDURE — 250N000013 HC RX MED GY IP 250 OP 250 PS 637: Performed by: INTERNAL MEDICINE

## 2020-10-07 PROCEDURE — 85027 COMPLETE CBC AUTOMATED: CPT | Performed by: HOSPITALIST

## 2020-10-07 PROCEDURE — 96372 THER/PROPH/DIAG INJ SC/IM: CPT | Performed by: INTERNAL MEDICINE

## 2020-10-07 PROCEDURE — 258N000003 HC RX IP 258 OP 636: Performed by: INTERNAL MEDICINE

## 2020-10-07 PROCEDURE — 250N000011 HC RX IP 250 OP 636: Performed by: INTERNAL MEDICINE

## 2020-10-07 PROCEDURE — 36415 COLL VENOUS BLD VENIPUNCTURE: CPT | Performed by: INTERNAL MEDICINE

## 2020-10-07 PROCEDURE — 87591 N.GONORRHOEAE DNA AMP PROB: CPT | Performed by: INTERNAL MEDICINE

## 2020-10-07 PROCEDURE — 87389 HIV-1 AG W/HIV-1&-2 AB AG IA: CPT | Performed by: INTERNAL MEDICINE

## 2020-10-07 PROCEDURE — 87522 HEPATITIS C REVRS TRNSCRPJ: CPT | Performed by: INTERNAL MEDICINE

## 2020-10-07 PROCEDURE — 250N000013 HC RX MED GY IP 250 OP 250 PS 637: Performed by: NURSE PRACTITIONER

## 2020-10-07 PROCEDURE — 120N000002 HC R&B MED SURG/OB UMMC

## 2020-10-07 PROCEDURE — 99232 SBSQ HOSP IP/OBS MODERATE 35: CPT | Performed by: HOSPITALIST

## 2020-10-07 PROCEDURE — 80048 BASIC METABOLIC PNL TOTAL CA: CPT | Performed by: HOSPITALIST

## 2020-10-07 PROCEDURE — 87340 HEPATITIS B SURFACE AG IA: CPT | Performed by: INTERNAL MEDICINE

## 2020-10-07 PROCEDURE — 86592 SYPHILIS TEST NON-TREP QUAL: CPT | Performed by: INTERNAL MEDICINE

## 2020-10-07 PROCEDURE — 86780 TREPONEMA PALLIDUM: CPT | Performed by: INTERNAL MEDICINE

## 2020-10-07 PROCEDURE — 86140 C-REACTIVE PROTEIN: CPT | Performed by: HOSPITALIST

## 2020-10-07 RX ORDER — SODIUM CHLORIDE 9 MG/ML
INJECTION, SOLUTION INTRAVENOUS
Status: DISPENSED
Start: 2020-10-07 | End: 2020-10-07

## 2020-10-07 RX ADMIN — ESCITALOPRAM OXALATE 10 MG: 10 TABLET, FILM COATED ORAL at 10:04

## 2020-10-07 RX ADMIN — AMPICILLIN SODIUM AND SULBACTAM SODIUM 3 G: 2; 1 INJECTION, POWDER, FOR SOLUTION INTRAMUSCULAR; INTRAVENOUS at 20:26

## 2020-10-07 RX ADMIN — ACETAMINOPHEN 650 MG: 325 TABLET, FILM COATED ORAL at 20:52

## 2020-10-07 RX ADMIN — VANCOMYCIN HYDROCHLORIDE 1500 MG: 1 INJECTION, POWDER, LYOPHILIZED, FOR SOLUTION INTRAVENOUS at 10:06

## 2020-10-07 RX ADMIN — QUETIAPINE FUMARATE 25 MG: 25 TABLET ORAL at 20:53

## 2020-10-07 RX ADMIN — AMPICILLIN SODIUM AND SULBACTAM SODIUM 3 G: 2; 1 INJECTION, POWDER, FOR SOLUTION INTRAMUSCULAR; INTRAVENOUS at 13:57

## 2020-10-07 RX ADMIN — OMEPRAZOLE 40 MG: 20 CAPSULE, DELAYED RELEASE ORAL at 10:04

## 2020-10-07 RX ADMIN — VANCOMYCIN HYDROCHLORIDE 1500 MG: 1 INJECTION, POWDER, LYOPHILIZED, FOR SOLUTION INTRAVENOUS at 21:41

## 2020-10-07 RX ADMIN — TRAZODONE HYDROCHLORIDE 50 MG: 50 TABLET ORAL at 20:53

## 2020-10-07 RX ADMIN — KETOROLAC TROMETHAMINE 15 MG: 30 INJECTION, SOLUTION INTRAMUSCULAR; INTRAVENOUS at 14:43

## 2020-10-07 RX ADMIN — ONDANSETRON 4 MG: 2 INJECTION INTRAMUSCULAR; INTRAVENOUS at 14:14

## 2020-10-07 RX ADMIN — AMPICILLIN SODIUM AND SULBACTAM SODIUM 3 G: 2; 1 INJECTION, POWDER, FOR SOLUTION INTRAMUSCULAR; INTRAVENOUS at 06:57

## 2020-10-07 RX ADMIN — ENOXAPARIN SODIUM 40 MG: 40 INJECTION SUBCUTANEOUS at 20:26

## 2020-10-07 NOTE — PLAN OF CARE
Pt up IND in room. Sleeping most of shift. Arouses to voice. Refused blood draw x2, pt stated that she didn't want to go through that tonight (blood draw) and said she would be willing tomorrow in the AM. VSS. IV antibiotics given without incident. Arms bandaged with kerlex and there is an order for daily soaks for hands and axillary wound. Patient refused assessment and just wanted to sleep. Able to make needs known and call light within reach. Continue to monitor and follow plan of care.

## 2020-10-07 NOTE — PLAN OF CARE
"  VS:    /58 (BP Location: Left arm)   Pulse 61   Temp 97.2  F (36.2  C) (Oral)   Resp 18   Ht 1.575 m (5' 2\")   Wt 70 kg (154 lb 6.4 oz)   SpO2 97%   BMI 28.24 kg/m      Output:    Voids spontaneously without difficulty into bathroom.    Activity:     Pt up indep in room. A&Ox4. Pt is very sleepy overnight but arouses to voice.   Skin: Cellulitis on bilat hands and r forearm.    Pain:    No complaints of pain/nausea/difficulty breathing   Neuro/CMS:    A&Ox4 Pt arouses to gentle shaking, but sleeps heavily otherwise. Neuros/CMS intact   Dressing(s):    Dressings to hands/forearm intact.   Diet:    Regular   Equipment:     IV pole    IV's/Drains:    IV abx infused ex vanco due to no IV access. New IV placed and infusing.    Plan:    Continue current plan of care. Pt to possibly discharge to [facility].   Additional Info:                 "

## 2020-10-07 NOTE — PLAN OF CARE
"Sleeping soundly this morning.  Morning meds deferred until mid morning.  She  then ordered a breakfast, but seems to fall asleep whenever left alone.  Antibiotics infusing.  Urine sample sent.    1530:  Patient seems more restless this afternoon, changing position frequently in bed.  Could not stay still for 10 minutes while hands were covered with medicated gauze. Dressings changed on hands and under AC area.  Serous drainage noted on hand dressings.  Right AC region dressing had small amount greenish drainage on dressing.  No active discharge noted.  Afebrile.        She did mention feeling nauseated this afternoon, and was medicated with Zofran. She had a very late breakfast, and said she would not eat lunch, but would be planning on a good supper.   She was asked about her comfort, and said that she hurt \"just about everywhere\".  She could not recall if it had been helpful to her when she had it a few days ago, but she was willing to try it again. Toradol given.   "

## 2020-10-07 NOTE — CONSULTS
Brief Social Work Note    SW attempted to meet with pt yesterday and today. Pt sleeping both times (writer tried both morning and afternoon visits).    Per notes, pt briefly spoke to CD and is interested in treatment.    Per medical team, pt will likely be here for 4-5 more days for IV abx and then will discharge to the community with oral medications. ID to continue to assess.     SW to continue to follow pt during hospitalization and assist with coordination for care and services.     Kassandra Hanley [facility]  Unit 5/Unit 8 Ortho/Med/Surg & [facility] Adult ED  Phone: [phone] Pager: [phone]

## 2020-10-07 NOTE — PROGRESS NOTES
10/7/2020    Spoke with pt, sounded like she just woke up. She is interested in tx and will complete tomorrow CD eval (10/08) at 2:30pm.     MARILEE Borjas  [email or web address]  [phone]

## 2020-10-07 NOTE — PROGRESS NOTES
GENERAL ID SERVICE CONSULTATION     Patient:  Giselle Mackenzie   Date of birth 1985, Medical record number [id number]  Date of Visit:  10/06/2020  Date of Admission: 10/4/2020  Consult Requester:No att. providers found            Assessment and Recommendations:   ASSESSMENT:  1. Multiple soft tissue abscesses s/p I/D of bilateral dorsal hands. Cx showing GAS and GPC in pairs/chains (which may be the same). I don't see clinical evidence for necrotizing infection, and seems to have improved in the last 24hrs while on amp/sulbactam. Likely etiology injection drug use.  2. Substance use disorder, heroin, methamphetamines. 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  3. PTSD  4. HCV Ab positive, treatment naive as far as I'm aware  5. History positive TPPA, last RPR in 10/2017 1:2      RECOMMENDATION:  1. Continue amp/sulbactam for optimal coverage of GAS.   2. Consider stopping vanco as no resistant gram-positives have grown.  2. f/u HIV Ab/Ag, GC/Chlamydia, HCV RNA, HepB Surf Ag, TPPA. Note that TPPA positive in the past, so will reflex to RPR, which at last check was 1:2 so a value higher than this would suggest acute infection.     Thanks for this consult. ID will follow.  Jen Duval MD   of Medicine, Division of Infectious Diseases  [facility] [phone]    ________________________________________________________________    Consult Question:.  Admission Diagnosis: Abscess of hand [L02.519]  Cellulitis of upper extremity, unspecified laterality [L03.119]         Interval 24hrs events   Quite sleepy when I see her. Reports hand pain is improving. No discomfort with movement of fingers. No GI or  complaints. No SOBr.           History of Present Illness:     This is a 36 y/o woman with PMH substance use disorder, injection drug use, bipolar disorder, and PTSD. She is admitted with bilateral upper extremity cellulitis.    The patient was sleeping when  I entered her room, Upon wakening, she is alert, but quite distracted by the presence of her meal tray and reports that she is very hungry and does not independently offer a history. She affirms my account after reviewing her record: she had mild constitutional symptoms prior to arrival and the development of bilateral hand abscesses that correspond to injection sites. She also has a more subacute nodule in her L axilla. She underwent I/D of bilateral hand abscesses in the ED. Preliminary cx of her R hand revealed GAS after 24hrs incubation. Bl cx are NG at 24hrs. She was placed on vanco and ceftriaxone.    No SOBr. No chest pain. No neurologic sx. No dysuria or GI symptoms. Appetite good. No vaginal discharge. No oral pain or sores. No skin findings apart from HPI.        Recent culture results include:  Culture Micro   Date Value Ref Range Status   10/04/2020 (A)  Final    Moderate growth  Beta hemolytic Streptococcus group A  This organism is susceptible to ampicillin, penicillin, vancomycin and the cephalosporins.   If treatment is required AND your patient is allergic to penicillin, contact the   Microbiology Lab within 5 days to request susceptibility testing.     10/04/2020 No growth after 2 days  Preliminary   03/04/2019 (A)  Final    Moderate growth  beta hemolytic   Streptococcus constellatus     03/04/2019 (A)  Final    Moderate growth  Haemophilus parainfluenzae  Susceptibility testing not routinely done     03/04/2019 Moderate growth  Eikenella corrodens   (A)  Final   03/04/2019 Heavy growth  Mixed aerobic and anaerobic duc   (A)  Final   03/04/2019 (A)  Final    Heavy growth  Actinomyces odontolyticus  Susceptibility testing not routinely done     03/04/2019 (A)  Final    Heavy growth  Anaerobic gram positive rods  Unable to completely identify  Susceptibility testing not routinely done     02/27/2019 No growth  Final   02/27/2019 No growth  Final              Current Medications (antimicrobials  listed in bold):       ampicillin-sulbactam (UNASYN) IV  3 g Intravenous Q6H     enoxaparin ANTICOAGULANT  40 mg Subcutaneous Q24H     escitalopram  10 mg Oral Daily     omeprazole  40 mg Oral QAM AC     QUEtiapine  25 mg Oral At Bedtime     sodium chloride (PF)  3 mL Intracatheter Q8H     traZODone  50 mg Oral At Bedtime     vancomycin (VANCOCIN) IV  1,500 mg Intravenous Q12H            Allergies:     Allergies   Allergen Reactions     Sulfa Drugs Anaphylaxis     Latex Hives     Nickel Hives     Suboxone      fainting            Physical Exam:   Vitals were reviewed  Patient Vitals for the past 24 hrs:   BP Temp Temp src Pulse Resp SpO2   10/06/20 1832 120/77 97.5  F (36.4  C) Oral 86 18 94 %   10/06/20 0804 106/60 97.2  F (36.2  C) Oral 74 20 98 %   10/06/20 0314 109/65 97.6  F (36.4  C) Oral 74 16 98 %     Ranges for his vital signs:  Temp:  [97.2  F (36.2  C)-97.6  F (36.4  C)] 97.5  F (36.4  C)  Pulse:  [74-86] 86  Resp:  [16-20] 18  BP: (106-120)/(60-77) 120/77  SpO2:  [94 %-98 %] 94 %    Physical Examination:  GENERAL:  Appears somewhat disheveled, NAD  NECK:  Supple. No  Cervical lymphadenopathy  LUNGS:  Clear to auscultation bilateral.   CARDIOVASCULAR:  Regular rate and rhythm.  ABDOMEN:  Normal bowel sounds, soft  SKIN:  R hand has some swelling and erythema - I don't appreciate any fluctuance. L hand less swollen and erythematous. No pain with movement of fingers.  NEUROLOGIC:  Grossly nonfocal. Active x4 extremities         Laboratory Data:     Inflammatory Markers    Recent Labs   Lab Test 10/05/20  0834 10/04/20  1405 02/27/19  0023   SED 38* 33* 10   .0* 150.0* 21.3*       Hematology Studies    Recent Labs   Lab Test 10/05/20  0834 10/04/20  1405 03/05/19  0623 03/04/19  0714 03/01/19  0656 02/27/19  0023 01/16/16  1442 01/16/16  1442 09/09/13  1450   WBC 7.9 17.9* 7.8 10.6 9.0 10.3   < > 19.1* 12.3*   ANEU 4.6 13.0*  --   --   --  6.1  --  12.4* 7.2   AEOS 0.2 0.3  --   --   --  0.1  --  0.2  0.5   HGB 12.0 13.4 Drawn above stopped IV 14.4 15.4 14.4   < > 14.9 13.2   MCV 92 87 Drawn above stopped IV 85 84 83   < > 87 90    419 Drawn above stopped  363 364   < > 380 344    < > = values in this interval not displayed.       Immune Globulin Studies  No lab results found.    Metabolic Studies     Recent Labs   Lab Test 10/05/20  0834 10/04/20  1957 10/04/20  1405 03/05/19  0623 03/04/19  0714 03/01/19  0656     --  135 Drawn above stopped  141   POTASSIUM 4.4  --  4.8 Drawn above stopped IV 3.9 3.9   CHLORIDE 112*  --  102 Drawn above stopped  113*   CO2 21  --  25 Drawn above stopped IV 23 23   BUN 8  --  10 Drawn above stopped IV 12 11   CR 0.55 0.77 0.58 Drawn above stopped IV 0.84 0.89   GFRESTIMATED >90 >90 >90 >90 >90 84       Hepatic Studies    Recent Labs   Lab Test 10/05/20  0834 10/04/20  1405 03/05/18  1044 09/09/13  1450 08/16/13  1002 08/12/13  1415   BILITOTAL 0.3 0.5 0.2 <0.1* 0.3 0.2   ALKPHOS 65 89 122 70 59 60   ALBUMIN 2.4* 3.4 3.7 4.1 4.3 4.2   AST 38 42 32 17 18 22   ALT 65* 79* 56* 26 28 29       Thyroid Studies    Recent Labs   Lab Test 01/08/18  1044 09/09/13  1450   TSH 1.90 1.36       Microbiology:  Culture Micro   Date Value Ref Range Status   10/04/2020 (A)  Final    Moderate growth  Beta hemolytic Streptococcus group A  This organism is susceptible to ampicillin, penicillin, vancomycin and the cephalosporins.   If treatment is required AND your patient is allergic to penicillin, contact the   Microbiology Lab within 5 days to request susceptibility testing.     10/04/2020 No growth after 2 days  Preliminary   03/04/2019 (A)  Final    Moderate growth  beta hemolytic   Streptococcus constellatus     03/04/2019 (A)  Final    Moderate growth  Haemophilus parainfluenzae  Susceptibility testing not routinely done     03/04/2019 Moderate growth  Eikenella corrodens   (A)  Final   03/04/2019 Heavy growth  Mixed aerobic and anaerobic duc   (A)  Final    03/04/2019 (A)  Final    Heavy growth  Actinomyces odontolyticus  Susceptibility testing not routinely done     03/04/2019 (A)  Final    Heavy growth  Anaerobic gram positive rods  Unable to completely identify  Susceptibility testing not routinely done     02/27/2019 No growth  Final   02/27/2019 No growth  Final   10/25/2017 No Beta Streptococcus isolated  Final   09/22/2017 >100,000 colonies/mL  Escherichia coli   (A)  Final   05/25/2017 (A)  Final    >100,000 colonies/mL Escherichia coli  <10,000 colonies/mL mixed urogenital duc Susceptibility testing not routinely   done     01/16/2016   Final    10,000 to 50,000 colonies/mL mixed urogenital duc   04/17/2010   Final    10 to 50,000 colonies/mL Mixed gram negative and positive duc     Comment:     Multiple species present, probable perineal contamination.  Susceptibility testing not routinely done   04/17/2010   Final    Canceled:  Specimen improperly labeled. Canceled, Test credited   04/17/2010 10 to 50,000 colonies/mL Escherichia coli  Final   04/16/2010 Normal duc  Final       Urine Studies    Recent Labs   Lab Test 01/16/16  1450 09/09/13  1459 08/16/13  0942 08/12/13  1535   LEUKEST Large* Negative Negative Negative   WBCU >182*  --  0 <1       Vancomycin Levels    Recent Labs   Lab Test 10/06/20  0528   VANCOMYCIN 8.1       Serostatus:  No results found for: CMVG, CMIGG, CMIG, CMIM, CMVIGM, CMVM, CMLTX, HSVG1, HSVG2, HSVTP1, KY2795, HS12M, HS12GR, HS1GR, HS2GR, HERPES, HSIM, HSIG, HSIGR, HSVIGMAB, HSVG1, VARICZOSAB, EBVIGG, EBIGG, EBVAGN, [id number]  No results found for: EBIG2, EBIGM, TOXG  No lab results found.    Invalid input(s): HSV12, VZVG, [id number]    Toxoplasma Testing  No lab results found.    Invalid input(s): TOXPL, TOXABM, TOXPCR    Virology:  CMV viral loads  No lab results found.  No results found for: CMQNT, CMVQ  EBV viral loads   No lab results found.  No results found for: EBVDN, EBRES, EBVDN, EBVSP, EBVPC, EBVPCR  BK viral loads No  lab results found.    Invalid input(s): BKDN, BKVPC, BKVPCR  HSV testing  No lab results found.    Hepatitis B Testing   Recent Labs   Lab Test 03/05/18  1044 05/25/17  1248   HBCAB Nonreactive  --    HEPBANG Nonreactive Nonreactive     Hepatitis C Testing     Hepatitis C Antibody   Date Value Ref Range Status   05/25/2017 (A) NR Final    Reactive   A reactive result indicates one of the following 1) current HCV infection 2)   past HCV infection that has resolved or 3) false positivity. The CDC recommends   that a reactive result should be followed by Nucleic acid testing for HCV RNA.  If HCV RNA is detected, that indicates current HCV infection. If HCV RNA is not   detected, that indicates either past, resolved HCV infection, or false HCV   antibody positivity.   Assay performance characteristics have not been established for newborns,   infants, and children     04/16/2010 Negative NEG Final     Respiratory Virus Testing    No results found for: RS, FLUAG

## 2020-10-07 NOTE — PROGRESS NOTES
[facility], [place], [place]  Internal Medicine Progress Note      Assessment and Plans:     Giselle Mackenzie is a 35 year old female who was admitted on 10/4/2020.     35 year old female admitted on 10/4/2020. She PMH for cellulitis, asthma, GERD, PTSD, MRSA, OCD, bipolar disorder, ADD, EtOH and IV drug use who presents to the ED with complaint of cellulitis - hands, arms, diarrhea and left armpit abscess.    She is S/P I&D of both hand abscesses ( Dorsum) in the ED by ED provider as advised by orthopedic team.    Individual problems and their management are outlined below    # Multiple bilateral upper extremity abscess:    Cause of abscesses being IVDU.2 large abscesses on the dorsum of both hands now s/p  I & D on 10/04 in ED. She has multiple other abscesses in evolution at various stages in both her arms ( please see separate picture from ER provider note). More importantly, patient is also at risk for bacteremia. Wound culture and blood cultures were sent. Patient received IV ceftriaxone, vancomycin and Metronidazole in the ER. She was resumed on  IV vanc and ceftriaxone on admission. She was started on oral Tylenol and IV ketorolac for pain management.     - Continue Unasyn for few more days  - Based on clinical response ID thinks AB can be changed to oral and she likely can be discharge on the weekend or so.   - CRP and WBC are much better.   - Wound culture growing Beta hemolytic Group A strep  - Seen by Ortho. No plan of OR per ortho  - Pain control     # H/O IVDU    IV drug of choice is IV heroin and Methamphetamine  Urine analysis is positive amphetamine, Cocaine, opiates and Cannabinoids    So far doing okay. No severe withdrawal noted. Only has nausea and HA. Sleeping most times.   COWS Score monitoring. Score was only 3 in AM.   Ct Methadone 30-20-10 started  Psychiatry was consulted. They agreed with methadone. Hx of Allergy  to Buprenorphine  Patient wishes to pursue treatment. However patient does not have insurance    SW consult to pursue with application for appropriate financial assistance      # Tobacco abuse:    Patient smokes about 2-3 pcs of cigarettes/ day  Continue Patch + gum for now      # GERD: Omeprazole 40 me every morning     # PTSD, OCD, Bipolar disorder and ADD  Currently not on any medications     Diet: Combination Diet Regular Diet Adult    DVT Prophylaxis: Enoxaparin (Lovenox) SQ  Pablo Catheter: not present  Code Status: Full Code        Gregg nAthony MD  Text Page  (7am - 5pm, M-F)    Subjective:     Overnight Events reviewed, Chart Reviewed  Sleeping most of the time. Did not want to answer my questions.    Hand infections some what better per reports.     -Data reviewed today: I reviewed all new labs and imaging results over the last 24 hours.     Exam:       Temp: 97.9  F (36.6  C) Temp src: Oral BP: 103/62 Pulse: 68   Resp: 19 SpO2: 98 % O2 Device: None (Room air)    Vitals:    10/04/20 2043   Weight: 70 kg (154 lb 6.4 oz)     Vital Signs with Ranges  Temp:  [96.3  F (35.7  C)-97.9  F (36.6  C)] 97.9  F (36.6  C)  Pulse:  [61-86] 68  Resp:  [18-20] 19  BP: (103-120)/(57-77) 103/62  SpO2:  [94 %-98 %] 98 %  I/O last 3 completed shifts:  In: 240 [P.O.:240]  Out: 900 [Urine:900]    Constitutional:  sleeping most of the time during interview. Respiratory: AE is goog on both sides, no wheezing onr crackles  Cardiovascular: S1S2 normal, no new murmur  GI: Soft, non tender  Skin/Integumen: BL hand infections and abscesses noted    Medications       ampicillin-sulbactam (UNASYN) IV  3 g Intravenous Q6H     enoxaparin ANTICOAGULANT  40 mg Subcutaneous Q24H     escitalopram  10 mg Oral Daily     omeprazole  40 mg Oral QAM AC     QUEtiapine  25 mg Oral At Bedtime     sodium chloride (PF)  3 mL Intracatheter Q8H     sodium chloride         traZODone  50 mg Oral At Bedtime     vancomycin (VANCOCIN) IV  1,500 mg  Intravenous Q12H       Data   Recent Labs   Lab 10/07/20  1140 10/05/20  0834 10/04/20  1957 10/04/20  1405   WBC 9.1 7.9  --  17.9*   HGB 15.1 12.0  --  13.4   MCV 88 92  --  87    356  --  419    137  --  135   POTASSIUM 4.1 4.4  --  4.8   CHLORIDE 107 112*  --  102   CO2 25 21  --  25   BUN 11 8  --  10   CR 0.65 0.55 0.77 0.58   ANIONGAP 6 4  --  8   BRYANT 9.1 7.8*  --  8.4*   * 101*  --  79   ALBUMIN  --  2.4*  --  3.4   PROTTOTAL  --  6.6*  --  7.7   BILITOTAL  --  0.3  --  0.5   ALKPHOS  --  65  --  89   ALT  --  65*  --  79*   AST  --  38  --  42       No results found for this or any previous visit (from the past 24 hour(s)).

## 2020-10-07 NOTE — PROGRESS NOTES
Orthopaedic Surgery Progress Note 10/07/2020    S: No acute events overnight.  Pain controlled on current regimen. Still feels she is improving with only antibiotic treatment and swelling and pain greatly improved. Denies numbness or tingling. Denies chest pain, calf pain, or SOB.    O:  Temp: 97.2  F (36.2  C) Temp src: Oral BP: 103/58 Pulse: 61   Resp: 18 SpO2: 97 % O2 Device: None (Room air)      Exam:  Gen: No acute distress, resting comfortably in bed, fatigued.  Resp: Non-labored breathing  MSK:  Right Upper Extremity: No deformity.  Abscess over antecubital fossa near IV and another over dorsum of hand. Dressings CDI. + erythema. + swelling. Swelling significantly decreased from yesterday. Slightly TTP over abscesses. Normal ROM wrist without pain. No pain with passive stretch wrist flexors. Motor intact distally with finger flexion/extension/intrinsics/EPL. SILT m/r/u nerve distributions. Radial pulse palpable, 2+.   Left Upper Extremity: No deformity. Abscess over over dorsum of hand. Dressings CDI. + erythema. + swelling. Swelling significantly decreased from yesterday. Slightly TTP over abscess. Normal ROM wrist without pain. No pain with passive stretch wrist flexors.  Motor intact distally with finger flexion/extension/intrinsics/EPL. SILT m/r/u nerve distributions. Radial pulse palpable, 2+.    Recent Labs   Lab 10/05/20  0834 10/04/20  1405   WBC 7.9 17.9*   HGB 12.0 13.4    419   .0* 150.0*       Culture results: Gram positive cocci in pairs and chains, preliminary culture resuts beta hemolytic GAS susceptible to amp, PCN, vanc, and cephalosporins.    ASSESSMENT AND PLAN:   Giselle Mackenzie is a 35 year old dominant female hxbipolar disorder and  EtOH and IV drug use with multiple abscesses over hands, forearms, axilla, and legs. Bedside irrigation and debridement performed in ED and patient initiated on IV abx.  No current plans for OR for formal I&D abscesses given response to more  conservative treatment. Continues to clinically improve.     Recommendations:  - Begin hand icrrg9o/ day  - Continue abx per primary  - WOC consult for further recommendations regarding wound care  - Pain control per primary  - Diet: Regular from orthopedic standpoint  - Chemical dependency service on board  - Orthopedics to continue to follow patient. Please contact with any question or concerns or if abscesses begin to worsen, if there is sudden increase in pain in hands, or inflammatory markers trend upward   - Follow-up: TBD  - Disposition: per primary      Rey Alexandra MD  Orthopaedic Surgery, PGY-1  Pager: [phone]

## 2020-10-08 LAB
N GONORRHOEA DNA SPEC QL NAA+PROBE: NEGATIVE
SPECIMEN SOURCE: NORMAL
VANCOMYCIN SERPL-MCNC: 14.5 MG/L

## 2020-10-08 PROCEDURE — 99232 SBSQ HOSP IP/OBS MODERATE 35: CPT | Performed by: INTERNAL MEDICINE

## 2020-10-08 PROCEDURE — 80202 ASSAY OF VANCOMYCIN: CPT | Performed by: HOSPITALIST

## 2020-10-08 PROCEDURE — 96372 THER/PROPH/DIAG INJ SC/IM: CPT | Performed by: INTERNAL MEDICINE

## 2020-10-08 PROCEDURE — 250N000013 HC RX MED GY IP 250 OP 250 PS 637: Performed by: INTERNAL MEDICINE

## 2020-10-08 PROCEDURE — 250N000011 HC RX IP 250 OP 636: Performed by: INTERNAL MEDICINE

## 2020-10-08 PROCEDURE — 99232 SBSQ HOSP IP/OBS MODERATE 35: CPT | Performed by: NURSE PRACTITIONER

## 2020-10-08 PROCEDURE — 258N000003 HC RX IP 258 OP 636: Performed by: INTERNAL MEDICINE

## 2020-10-08 PROCEDURE — 250N000013 HC RX MED GY IP 250 OP 250 PS 637: Performed by: NURSE PRACTITIONER

## 2020-10-08 PROCEDURE — 999N000216 HC STATISTIC ADULT CD FACE TO FACE-NO CHRG

## 2020-10-08 PROCEDURE — 120N000002 HC R&B MED SURG/OB UMMC

## 2020-10-08 PROCEDURE — 36415 COLL VENOUS BLD VENIPUNCTURE: CPT | Performed by: HOSPITALIST

## 2020-10-08 PROCEDURE — 258N000003 HC RX IP 258 OP 636

## 2020-10-08 RX ORDER — SODIUM CHLORIDE 9 MG/ML
INJECTION, SOLUTION INTRAVENOUS
Status: COMPLETED
Start: 2020-10-08 | End: 2020-10-08

## 2020-10-08 RX ORDER — HYDROXYZINE HYDROCHLORIDE 25 MG/1
50 TABLET, FILM COATED ORAL EVERY 6 HOURS PRN
Status: DISCONTINUED | OUTPATIENT
Start: 2020-10-08 | End: 2020-10-10 | Stop reason: HOSPADM

## 2020-10-08 RX ADMIN — AMPICILLIN SODIUM AND SULBACTAM SODIUM 3 G: 2; 1 INJECTION, POWDER, FOR SOLUTION INTRAMUSCULAR; INTRAVENOUS at 01:58

## 2020-10-08 RX ADMIN — HYDROXYZINE HYDROCHLORIDE 50 MG: 25 TABLET, FILM COATED ORAL at 10:09

## 2020-10-08 RX ADMIN — VANCOMYCIN HYDROCHLORIDE 1500 MG: 1 INJECTION, POWDER, LYOPHILIZED, FOR SOLUTION INTRAVENOUS at 10:00

## 2020-10-08 RX ADMIN — ENOXAPARIN SODIUM 40 MG: 40 INJECTION SUBCUTANEOUS at 19:57

## 2020-10-08 RX ADMIN — OMEPRAZOLE 40 MG: 20 CAPSULE, DELAYED RELEASE ORAL at 08:45

## 2020-10-08 RX ADMIN — QUETIAPINE FUMARATE 25 MG: 25 TABLET ORAL at 20:36

## 2020-10-08 RX ADMIN — ACETAMINOPHEN 650 MG: 325 TABLET, FILM COATED ORAL at 14:31

## 2020-10-08 RX ADMIN — SODIUM CHLORIDE: 9 INJECTION, SOLUTION INTRAVENOUS at 17:30

## 2020-10-08 RX ADMIN — ESCITALOPRAM OXALATE 10 MG: 10 TABLET, FILM COATED ORAL at 08:45

## 2020-10-08 RX ADMIN — HYDROXYZINE HYDROCHLORIDE 50 MG: 25 TABLET, FILM COATED ORAL at 16:53

## 2020-10-08 RX ADMIN — TRAZODONE HYDROCHLORIDE 50 MG: 50 TABLET ORAL at 20:35

## 2020-10-08 RX ADMIN — AMPICILLIN SODIUM AND SULBACTAM SODIUM 3 G: 2; 1 INJECTION, POWDER, FOR SOLUTION INTRAMUSCULAR; INTRAVENOUS at 14:32

## 2020-10-08 RX ADMIN — AMPICILLIN SODIUM AND SULBACTAM SODIUM 3 G: 2; 1 INJECTION, POWDER, FOR SOLUTION INTRAMUSCULAR; INTRAVENOUS at 08:45

## 2020-10-08 RX ADMIN — ACETAMINOPHEN 650 MG: 325 TABLET, FILM COATED ORAL at 20:36

## 2020-10-08 NOTE — PROGRESS NOTES
"[facility], [place]   Internal Medicine Daily Note           Interval History/Events     Overnight events reviewed.   Wants some thing for anxiety  Requesting to be on Methadone  Otherwise reports doing well.            Review of Systems        4 point ROS including Respiratory, CV, GI and , other than that noted above is negative      Medications   I have reviewed current medications  in the \"current medication\" section of Epic.  Relevant changes include:     Physical Exam   General:       Vital signs:    Blood pressure 114/66, pulse 61, temperature 96.6  F (35.9  C), temperature source Oral, resp. rate 18, height 1.575 m (5' 2\"), weight 70 kg (154 lb 6.4 oz), SpO2 97 %, not currently breastfeeding.  Estimated body mass index is 28.24 kg/m  as calculated from the following:    Height as of this encounter: 1.575 m (5' 2\").    Weight as of this encounter: 70 kg (154 lb 6.4 oz).    No intake or output data in the 24 hours ending 10/08/20 1508     Constitutional: Laying in bed in no acute distress  Eye: No icterus, no pallor  Mouth/ENT: Normal oral mucosa  Cardiovascular: S1, S2 normal.   Respiratory: B/L CTA  GI: Soft, NT, BS+  : No schulz's catheter  Neurology: Alert,a wake, and oriented.   Psych:   MSK: Hand swelling and redness is improing  Integumentary:   Heme/Lymph/Imm:      Laboratory and Imaging Studies     I have reviewed  laboratory and imaging studies in the Epic. Pertinent findings are as below:    BMP  Recent Labs   Lab 10/07/20  1140 10/05/20  0834 10/04/20  1957 10/04/20  1405    137  --  135   POTASSIUM 4.1 4.4  --  4.8   CHLORIDE 107 112*  --  102   BRYANT 9.1 7.8*  --  8.4*   CO2 25 21  --  25   BUN 11 8  --  10   CR 0.65 0.55 0.77 0.58   * 101*  --  79     CBC  Recent Labs   Lab 10/07/20  1140 10/05/20  0834 10/04/20  1405   WBC 9.1 7.9 17.9*   RBC 5.31* 4.11 4.60   HGB 15.1 12.0 13.4   HCT 46.5 37.6 40.1   MCV 88 92 87   MCH 28.4 29.2 29.1   MCHC 32.5 31.9 " 33.4   RDW 14.1 14.2 13.7    356 419     INRNo lab results found in last 7 days.  LFTs  Recent Labs   Lab 10/05/20  0834 10/04/20  1405   ALKPHOS 65 89   AST 38 42   ALT 65* 79*   BILITOTAL 0.3 0.5   PROTTOTAL 6.6* 7.7   ALBUMIN 2.4* 3.4      PANCNo lab results found in last 7 days.        Impression/Plan          35 year old female who was admitted on 10/4/2020.      35 year old female admitted on 10/4/2020. She PMH for cellulitis, asthma, GERD, PTSD, MRSA, OCD, bipolar disorder, ADD, EtOH and IV drug use who presents to the ED with complaint of cellulitis - hands, arms, diarrhea and left armpit abscess.     She is S/P I&D of both hand abscesses ( Dorsum) in the ED by ED provider as advised by orthopedic team.     Individual problems and their management are outlined below     # Multiple bilateral upper extremity abscess:     Cause of abscesses being IVDU.2 large abscesses on the dorsum of both hands now s/p  I & D on 10/04 in ED. She has multiple other abscesses in evolution at various stages in both her arms ( please see separate picture from ER provider note). More importantly, patient is also at risk for bacteremia. Wound culture and blood cultures were sent. Patient received IV ceftriaxone, vancomycin and Metronidazole in the ER. She was resumed on  IV vanc and ceftriaxone on admission. She was started on oral Tylenol and IV ketorolac for pain management.    - Continue Unasyn for few more days  - Based on clinical response ID thinks AB can be changed to oral and she likely can be discharge on the weekend or so.   - CRP and WBC are much better.   - Wound culture growing Beta hemolytic Group A strep  - Seen by Ortho. No plan of OR per ortho  - Pain control      # H/O IVDU     IV drug of choice is IV heroin and Methamphetamine  Urine analysis is positive amphetamine, Cocaine, opiates and Cannabinoids     So far doing okay. No severe withdrawal noted. Only has nausea and HA. Sleeping most times.   COWS  Score monitoring.  Ct Methadone 30-20-10 started  Psychiatry was consulted. They agreed with methadone. Hx of Allergy to Buprenorphine  Patient wishes to pursue treatment. However patient does not have insurance     SW consult to pursue with application for appropriate financial assistance, and Psychiatry consult placed, and case discussed.     # Hx of Positive TPPA:   RPR pending.      # Tobacco abuse:     Patient smokes about 2-3 pcs of cigarettes/ day  Continue Patch + gum for now      # GERD: Omeprazole 40 me every morning      # PTSD, OCD, Bipolar disorder and ADD  Currently not on any medications      Diet: Combination Diet Regular Diet Adult    DVT Prophylaxis: Enoxaparin (Lovenox) SQ  Pablo Catheter: not present  Code Status: Full Code         Pt's care was discussed with bedside RN, patient and  during Care Team Rounds.               Wilfrid Iqbal MD  Hospitalist ( Internal medicine)  Pager: [phone]

## 2020-10-08 NOTE — PLAN OF CARE
"      VS:   /62 (BP Location: Left arm)   Pulse 68   Temp 97.9  F (36.6  C) (Oral)   Resp 19   Ht 1.575 m (5' 2\")   Wt 70 kg (154 lb 6.4 oz)   SpO2 98%   BMI 28.24 kg/m    LS clear  COWS score 6   Output:   Put voiding good amounts w/o difficulty, last BM this morning, denies loose stools.    Activity:   Up in room independently- steady on feet.    Skin: Dressing change to L armpit completed this evening, pt refused to have hands soaked this evening. Dressings in place on both R and L hands and R arm CDI. Scabs on both arms and L heel.    Pain:   Pt reports general body aches- tylenol administered.    Neuro/CMS:   A&Ox4, pt denies any numbness/tingling. +3 edema in both R and L hands.   Dressing(s):   All dressings on R and L hands and R arm CDI.   Diet:   Regular diet, pt tolerating well.    LDA:   PIV in L AC SL in between IV abx.    Equipment:   IV Pole   Plan:   Continue with IV abx, pt interest in CD treatment. Pt is able to make needs known, call light within reach, continue with POC.    Additional Info:   Pt refused linen change or shower this evening, slept majority of the evening, reports not feeling well due to withdrawals and wanted to be left alone.       "

## 2020-10-08 NOTE — PLAN OF CARE
A&O but lethargic and uncooperative. Pt denied pain, nausea, SOB or any other concerns this shift. Pt refused any additional assessments or interventions. Scheduled medications administered as ordered including IV antibiotics. IV patent and infusing during administration. Pt slept all shift. Call light within reach and pt able to make needs known.

## 2020-10-08 NOTE — PROGRESS NOTES
GENERAL ID SERVICE CONSULTATION     Patient:  Giselle Mackenzie   Date of birth 1985, Medical record number [id number]  Date of Visit:  10/08/2020  Date of Admission: 10/4/2020  Consult Requester:No att. providers found            Assessment and Recommendations:   ASSESSMENT:  1. Multiple soft tissue abscesses s/p I/D of bilateral dorsal hands. Cx showing GAS and GPC in pairs/chains (which may be the same). I don't see clinical evidence for necrotizing infection, and seems to have improved in the last 24hrs while on amp/sulbactam. Likely etiology injection drug use.  2. Substance use disorder, heroin, methamphetamines. 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  3. PTSD  4. HCV Ab positive, treatment naive as far as I'm aware. HIV Ag negative. HBV immune. HAV non-immune  5. History positive TPPA, last RPR in 10/2017 1:2, now RPR negative thus no evidence of syphilis re-infection.      RECOMMENDATION:  1. Continue amp/sulbactam for optimal coverage of GAS. Reasonable to continue vanco since she's done very well to date.  2. Ultimately I anticipate discharge on PO doxycycline 100 BID and PO augmentin 875/125 BID (I typically prefer TID dosing with 500/125 but I think BID will be best for adherence since doxy is BID) to complete 2 weeks of therapy which translates to a stop date of 10/19  3. Consider Hep A vaccine at discharge.    Thanks for this consult. ID will follow.  Jen Duval MD   of Medicine, Division of Infectious Diseases  [facility] [phone]    ________________________________________________________________    Consult Question:.  Admission Diagnosis: Abscess of hand [L02.519]  Cellulitis of upper extremity, unspecified laterality [L03.119]         Interval 24hrs events   Quite sleepy when I see her. But awakens easily. Reports she is uncomfortable due to symptoms of withdrawal. Hand pain is improving. No  or GI complaints. No chest pain or SOBr.           History  of Present Illness:     This is a 36 y/o woman with PMH substance use disorder, injection drug use, bipolar disorder, and PTSD. She is admitted with bilateral upper extremity cellulitis.    The patient was sleeping when I entered her room, Upon wakening, she is alert, but quite distracted by the presence of her meal tray and reports that she is very hungry and does not independently offer a history. She affirms my account after reviewing her record: she had mild constitutional symptoms prior to arrival and the development of bilateral hand abscesses that correspond to injection sites. She also has a more subacute nodule in her L axilla. She underwent I/D of bilateral hand abscesses in the ED. Preliminary cx of her R hand revealed GAS after 24hrs incubation. Bl cx are NG at 24hrs. She was placed on vanco and ceftriaxone.    No SOBr. No chest pain. No neurologic sx. No dysuria or GI symptoms. Appetite good. No vaginal discharge. No oral pain or sores. No skin findings apart from HPI.        Recent culture results include:  Culture Micro   Date Value Ref Range Status   10/04/2020 (A)  Final    Moderate growth  Beta hemolytic Streptococcus group A  This organism is susceptible to ampicillin, penicillin, vancomycin and the cephalosporins.   If treatment is required AND your patient is allergic to penicillin, contact the   Microbiology Lab within 5 days to request susceptibility testing.     10/04/2020 No growth after 4 days  Preliminary   03/04/2019 (A)  Final    Moderate growth  beta hemolytic   Streptococcus constellatus     03/04/2019 (A)  Final    Moderate growth  Haemophilus parainfluenzae  Susceptibility testing not routinely done     03/04/2019 Moderate growth  Eikenella corrodens   (A)  Final   03/04/2019 Heavy growth  Mixed aerobic and anaerobic duc   (A)  Final   03/04/2019 (A)  Final    Heavy growth  Actinomyces odontolyticus  Susceptibility testing not routinely done     03/04/2019 (A)  Final    Heavy  growth  Anaerobic gram positive rods  Unable to completely identify  Susceptibility testing not routinely done     02/27/2019 No growth  Final   02/27/2019 No growth  Final              Current Medications (antimicrobials listed in bold):       ampicillin-sulbactam (UNASYN) IV  3 g Intravenous Q6H     enoxaparin ANTICOAGULANT  40 mg Subcutaneous Q24H     escitalopram  10 mg Oral Daily     omeprazole  40 mg Oral QAM AC     QUEtiapine  25 mg Oral At Bedtime     sodium chloride (PF)  3 mL Intracatheter Q8H     sodium chloride         traZODone  50 mg Oral At Bedtime     vancomycin (VANCOCIN) IV  1,500 mg Intravenous Q12H            Allergies:     Allergies   Allergen Reactions     Sulfa Drugs Anaphylaxis     Latex Hives     Nickel Hives     Suboxone      fainting            Physical Exam:   Vitals were reviewed  Patient Vitals for the past 24 hrs:   BP Temp Temp src Pulse Resp SpO2   10/08/20 1559 107/57 97  F (36.1  C) Oral 68 16 96 %   10/08/20 0812 114/66 96.6  F (35.9  C) Oral 61 18 97 %     Ranges for his vital signs:  Temp:  [96.6  F (35.9  C)-97  F (36.1  C)] 97  F (36.1  C)  Pulse:  [61-68] 68  Resp:  [16-18] 16  BP: (107-114)/(57-66) 107/57  SpO2:  [96 %-97 %] 96 %    Physical Examination:  GENERAL:  Appears somewhat disheveled, NAD  NECK:  Supple. No  Cervical lymphadenopathy  LUNGS:  Clear to auscultation bilateral.   CARDIOVASCULAR:  Regular rate and rhythm.  ABDOMEN:  Normal bowel sounds, soft  SKIN:  R hand has some swelling and erythema improved from prior and I don't appreciate any fluctuance. L hand less swollen and erythematous. No pain with movement of fingers.  NEUROLOGIC:  Grossly nonfocal. Active x4 extremities         Laboratory Data:     Inflammatory Markers    Recent Labs   Lab Test 10/07/20  1140 10/05/20  0834 10/04/20  1405 02/27/19  0023   SED  --  38* 33* 10   CRP 35.0* 120.0* 150.0* 21.3*       Hematology Studies    Recent Labs   Lab Test 10/07/20  1140 10/05/20  0834 10/04/20  1405  03/05/19  0623 03/04/19  0714 03/01/19  0656 02/27/19  0023 01/16/16  1442 01/16/16  1442 09/09/13  1450   WBC 9.1 7.9 17.9* 7.8 10.6 9.0 10.3   < > 19.1* 12.3*   ANEU  --  4.6 13.0*  --   --   --  6.1  --  12.4* 7.2   AEOS  --  0.2 0.3  --   --   --  0.1  --  0.2 0.5   HGB 15.1 12.0 13.4 Drawn above stopped IV 14.4 15.4 14.4   < > 14.9 13.2   MCV 88 92 87 Drawn above stopped IV 85 84 83   < > 87 90    356 419 Drawn above stopped  363 364   < > 380 344    < > = values in this interval not displayed.       Immune Globulin Studies  No lab results found.    Metabolic Studies     Recent Labs   Lab Test 10/07/20  1140 10/05/20  0834 10/04/20  1957 10/04/20  1405 03/05/19  0623 03/04/19  0714    137  --  135 Drawn above stopped    POTASSIUM 4.1 4.4  --  4.8 Drawn above stopped IV 3.9   CHLORIDE 107 112*  --  102 Drawn above stopped    CO2 25 21  --  25 Drawn above stopped IV 23   BUN 11 8  --  10 Drawn above stopped IV 12   CR 0.65 0.55 0.77 0.58 Drawn above stopped IV 0.84   GFRESTIMATED >90 >90 >90 >90 >90 >90       Hepatic Studies    Recent Labs   Lab Test 10/05/20  0834 10/04/20  1405 03/05/18  1044 09/09/13  1450 08/16/13  1002 08/12/13  1415   BILITOTAL 0.3 0.5 0.2 <0.1* 0.3 0.2   ALKPHOS 65 89 122 70 59 60   ALBUMIN 2.4* 3.4 3.7 4.1 4.3 4.2   AST 38 42 32 17 18 22   ALT 65* 79* 56* 26 28 29       Thyroid Studies    Recent Labs   Lab Test 01/08/18  1044 09/09/13  1450   TSH 1.90 1.36       Microbiology:  Culture Micro   Date Value Ref Range Status   10/04/2020 (A)  Final    Moderate growth  Beta hemolytic Streptococcus group A  This organism is susceptible to ampicillin, penicillin, vancomycin and the cephalosporins.   If treatment is required AND your patient is allergic to penicillin, contact the   Microbiology Lab within 5 days to request susceptibility testing.     10/04/2020 No growth after 4 days  Preliminary   03/04/2019 (A)  Final    Moderate growth  beta hemolytic    Streptococcus constellatus     03/04/2019 (A)  Final    Moderate growth  Haemophilus parainfluenzae  Susceptibility testing not routinely done     03/04/2019 Moderate growth  Eikenella corrodens   (A)  Final   03/04/2019 Heavy growth  Mixed aerobic and anaerobic duc   (A)  Final   03/04/2019 (A)  Final    Heavy growth  Actinomyces odontolyticus  Susceptibility testing not routinely done     03/04/2019 (A)  Final    Heavy growth  Anaerobic gram positive rods  Unable to completely identify  Susceptibility testing not routinely done     02/27/2019 No growth  Final   02/27/2019 No growth  Final   10/25/2017 No Beta Streptococcus isolated  Final   09/22/2017 >100,000 colonies/mL  Escherichia coli   (A)  Final   05/25/2017 (A)  Final    >100,000 colonies/mL Escherichia coli  <10,000 colonies/mL mixed urogenital duc Susceptibility testing not routinely   done     01/16/2016   Final    10,000 to 50,000 colonies/mL mixed urogenital duc   04/17/2010   Final    10 to 50,000 colonies/mL Mixed gram negative and positive duc     Comment:     Multiple species present, probable perineal contamination.  Susceptibility testing not routinely done   04/17/2010   Final    Canceled:  Specimen improperly labeled. Canceled, Test credited   04/17/2010 10 to 50,000 colonies/mL Escherichia coli  Final   04/16/2010 Normal duc  Final       Urine Studies    Recent Labs   Lab Test 01/16/16  1450 09/09/13  1459 08/16/13  0942 08/12/13  1535   LEUKEST Large* Negative Negative Negative   WBCU >182*  --  0 <1       Vancomycin Levels    Recent Labs   Lab Test 10/08/20  0900 10/06/20  0528   VANCOMYCIN 14.5 8.1       Serostatus:  No results found for: CMVG, CMIGG, CMIG, CMIM, CMVIGM, CMVM, CMLTX, HSVG1, HSVG2, HSVTP1, VZ2209, HS12M, HS12GR, HS1GR, HS2GR, HERPES, HSIM, HSIG, HSIGR, HSVIGMAB, HSVG1, VARICZOSAB, EBVIGG, EBIGG, EBVAGN, LP9421  No results found for: EBIG2, EBIGM, TOXG  No lab results found.    Invalid input(s): HSV12, VZVG,  INF695    Toxoplasma Testing  No lab results found.    Invalid input(s): TOXPL, TOXABM, TOXPCR    Virology:  CMV viral loads  No lab results found.  No results found for: CMQNT, CMVQ  EBV viral loads   No lab results found.  No results found for: EBVDN, EBRES, EBVDN, EBVSP, EBVPC, EBVPCR  BK viral loads No lab results found.    Invalid input(s): BKDN, BKVPC, BKVPCR  HSV testing  No lab results found.    Hepatitis B Testing   Recent Labs   Lab Test 10/07/20  1140 03/05/18  1044   HBCAB  --  Nonreactive   HEPBANG Nonreactive Nonreactive     Hepatitis C Testing     Hepatitis C Antibody   Date Value Ref Range Status   05/25/2017 (A) NR Final    Reactive   A reactive result indicates one of the following 1) current HCV infection 2)   past HCV infection that has resolved or 3) false positivity. The CDC recommends   that a reactive result should be followed by Nucleic acid testing for HCV RNA.  If HCV RNA is detected, that indicates current HCV infection. If HCV RNA is not   detected, that indicates either past, resolved HCV infection, or false HCV   antibody positivity.   Assay performance characteristics have not been established for newborns,   infants, and children     04/16/2010 Negative NEG Final     Respiratory Virus Testing    No results found for: RS, FLUAG

## 2020-10-08 NOTE — PROGRESS NOTES
Care Management Follow Up Note    Length of Stay (days) 4    Patient plan of care discussed at Interdisciplinary Rounds: yes  Expected Discharge Date: 10/10/20  Concerns to be Addressed:  Insurance     Anticipated Discharge Disposition:  Home  Anticipated Discharge Services:  None  Anticipated Discharge DME:  None    Plan:  SW reached out to financial counseling ([phone]) to discuss getting pt assistance with insurance. Per Genie at financial counseling, [facility]'s system is lagged currently-but they are showing that pt will be MA pending. Pt's insurance will go through once [facility] processes her information.     BO Cook  Unit 5/[address] 8 Ortho/Med/Surg & [facility] Adult ED  Phone: [phone] Pager: [phone]

## 2020-10-08 NOTE — PHARMACY-VANCOMYCIN DOSING SERVICE
Pharmacy Vancomycin Note  Date of Service 2020  Patient's  1985   35 year old, female    Indication: Multiple bilateral upper extremity abscess  Goal Trough Level: 10-15 mg/L (lower goal with no MRSA cultured on 10/4 although pt has h/o MRSA)  Day of Therapy: started on 10/4/2020  Current Vancomycin regimen:  1500 mg IV q12h    Current estimated CrCl = Estimated Creatinine Clearance: 109.7 mL/min (based on SCr of 0.65 mg/dL).    Creatinine for last 3 days  10/7/2020: 11:40 AM Creatinine 0.65 mg/dL    Recent Vancomycin Levels (past 3 days)  10/6/2020:  5:28 AM Vancomycin Level 8.1 mg/L  10/8/2020:  9:00 AM Vancomycin Level 14.5 mg/L    Vancomycin IV Administrations (past 72 hours)                   vancomycin (VANCOCIN) 1,500 mg in sodium chloride 0.9 % 250 mL intermittent infusion (mg) 1,500 mg New Bag 10/07/20 2141     1,500 mg New Bag  1006     1,500 mg New Bag 10/06/20 1914     1,500 mg New Bag  0835     1,500 mg New Bag 10/05/20 1754                Nephrotoxins and other renal medications (From now, onward)    Start     Dose/Rate Route Frequency Ordered Stop    10/05/20 1230  ampicillin-sulbactam (UNASYN) 3 g vial to attach to  mL bag      3 g  over 1 Hours Intravenous EVERY 6 HOURS 10/05/20 1229      10/05/20 0600  vancomycin (VANCOCIN) 1,500 mg in sodium chloride 0.9 % 250 mL intermittent infusion      1,500 mg  over 90 Minutes Intravenous EVERY 12 HOURS 10/04/20 2003               Contrast Orders - past 72 hours (72h ago, onward)    None          Interpretation of levels and current regimen:  Trough level is  Therapeutic    Has serum creatinine changed > 50% in last 72 hours: No    Urine output:  unable to determine    Renal Function: Stable    Plan:  1.  Continue Current Dose Vancomycin 1500 mg IV q12h  2.  Pharmacy will check trough levels as appropriate in 1-3 Days.    3. Serum creatinine levels will be ordered a minimum of twice weekly.      Tin Franklin [facility]        .

## 2020-10-08 NOTE — CONSULTS
"10/8/2020    CD consult closing at this time. There have been 3 attempts to speak with pt.   I spoke with pt on 10/07, we agreed to do CD eval today (10/08) at 2:30pm.   I called pt today at 2:40pm. I asked if she had tx centers in mind, asking her what she is thinking about in regards to treatment. Pt replied saying \"I don't know, I just woke up and my IVs are beeping. Can we do this tomorrow?\"  I explained that I cannot guarantee that at this time. I explained that if we didn't start an eval in the next 10 mins I would not have time to complete one today. She asked that I call her back in 5 mins (and was rather irritated).   I then tried calling pt back at 2:54pm. It continued to ring and she did not answer.    Based on chart review, these are the recommendations:  - If she is going to go back to [facility] for methadone, she will have an assigned LADC. They will do a Rule 25 with her when she begins methadone with them and they can make referrals.   - Same goes for [facility] if she goes through [facility] for methadone.   - Otherwise pt can go to the following and obtain an assessment:    Community Rule 25 Assessments   For clients with Medical Assistance, [place] [employer], or no insurance (if eligible).   For clients without insurance, please see the income limitations to determine eligibility. Clients can all their county for a full list of providers.   Availability and services subject to change, please call to confirm.     [facility] Appointments and walk-ins   [address] [place] [address]   Phone: [phone]     [facility]   Appointments only   Locations: [place], [place], [facility], [facility]   Phone: [phone]     [facility]   Appointments only   Monday-Saturday   [address]   [place] [address]   Phone: [phone]     [facility]   Appointments only   [address] [place] [address]   Phone: [phone]     [facility] "   Appointments only   Locations: [place], [facility]   Phone: [phone]     [facility]   Appointments and walk-ins   [address] [place] [address]   Phone: [phone]     [facility] Appointments only   Locations: [place], [place], [place], [place], [place], [place], [place]   Phone: [phone] or [phone]     [facility]   Appointments only   Locations: [place], [place]   Phone: [phone]     [facility]   Appointments only   Multiple locations in [place] and [place]   Phone: [phone]     [facility]   Locations: [place], [place], [place], [place], [place], [place], [place], [place], [place], [facility], [place], [place]   Phone: [phone] or [phone]     [facility] Appointments and walk-ins   Monday-Friday walk ins:   [address] [place] [address]   Saturday walk-ins:   [address] [place] [address] Phone: [phone]     [facility]   Appointments and walk-ins   [address] [facility] [facility] [address]   [place] [address]   Phone: [phone]     [facility]   Appointments only   Locations in [place], [place], [place], [place], [place]   Phone: [phone]     [facility]   Appointments and walk-ins   [address]   [place] [address]   Phone: [phone]     [facility]   Appointments and walk-ins   [address] [place] [address]   Phone: [phone]     [address] [facility]   [place] - [phone]  [place] - [phone]  [place] - [phone]  [place] - [phone]  [place] - [phone]  [place] - [phone]  [place] - [phone]  [place] - [phone]     Supportive Services   Community Drug & Alcohol Support Resources   Alcoholics Anonymous   24/7 Phone Line: 166.179.6392   https://[place].org/   https://[place].org/  "  For additional list of online meetings: http://aa-intergroup.org     Narcotics Anonymous of [place]   24 Hour Helpline: 1-349.638.4240   https://www.[place].org/   For online meetings, click \"Find a Meeting\" on homepage     [place] [employer]   [address] [place] [address]   Phone: [phone]   [email or web address]     MARILEE Borjas  [email or web address]  [phone]                      "

## 2020-10-08 NOTE — PLAN OF CARE
Pt very sleepy this shift. Wakes easily when conversation is directed to her. Patient c/o feeling anxious at beginning of shift. Dr. Iqbal in to see patient and updated. Hydroxyzine given. Pt appeared to do better after medication but continues to curl up in bed to sleep. Pt refused dressing changes despite multiple attempts and will let staff know when she will do it. Pt c/o generalized aches. Tylenol given. Antibiotics continued./dressing on hands intact. Appetite fair and picks at food on trays. Denies nausea. Psych in to see patient. Call light within reach. Pt able to make her needs known.    1750 Pt c/o feeling anxious at beginning of evening shift. Medicated with hydroxyzine  Pt continue to sleeping between nursing checks. Encouraged patient to increase activity but patient declined.  Dinner arrived and patient only picking at dinner tray. Denies nausea. Encouraged patient to do soaks and dressing change. Pt refused and stated she would do it at 2000.

## 2020-10-08 NOTE — CONSULTS
Psychiatry Consultation; Follow up              Reason for Consult, requesting source:    Methadone plan  Requesting source:                  Interim history:    Making progress with abscess treatment. Has completed methadone taper.  Per RN she will likely be able to covert to oral antibiotics and discharge is anticipated, perhaps over the weekend.  Insurance is pending but in process. She [place] [facility] is interested in resuming.  I discuss harm reduction strategies and return to methadone clinic once she is discharged. She says pain is under control but continues to have craving off and on. She is allergic to Suboxone.    HIV nonreactive. I see she is reactive with treponema antibodies as of yesterday- defer to treatment team/ID. She is on antibiotics.           Medications:     Current Facility-Administered Medications:      acetaminophen (TYLENOL) tablet 650 mg, 650 mg, Oral, Q4H PRN, Gregory Urena MD, 650 mg at 10/07/20 2052     alum & mag hydroxide-simethicone (MAALOX  ES) suspension 30 mL, 30 mL, Oral, Q4H PRN, Gregory Urena MD     ampicillin-sulbactam (UNASYN) 3 g vial to attach to  mL bag, 3 g, Intravenous, Q6H, Jen Duval MD, Last Rate: 100 mL/hr at 10/07/20 2026, 3 g at 10/08/20 0845     calcium carbonate (TUMS) chewable tablet 500 mg, 500 mg, Oral, TID PRN, Lucie Simmons MD     enoxaparin ANTICOAGULANT (LOVENOX) injection 40 mg, 40 mg, Subcutaneous, Q24H, Gregory Urena MD, 40 mg at 10/07/20 2026     escitalopram (LEXAPRO) tablet 10 mg, 10 mg, Oral, Daily, Shawanda Ross APRN CNP, 10 mg at 10/08/20 0845     hydrOXYzine (ATARAX) tablet 50 mg, 50 mg, Oral, Q6H PRN, Wilfrid Iqbal MD, 50 mg at 10/08/20 1009     lidocaine (LMX4) cream, , Topical, Q1H PRN, Gregory Urena MD     lidocaine 1 % 0.1-1 mL, 0.1-1 mL, Other, Q1H PRN, Gregory Urena MD     melatonin tablet 1 mg,  "1 mg, Oral, At Bedtime PRN, Gregory Urena MD     naloxone (NARCAN) injection 0.1-0.4 mg, 0.1-0.4 mg, Intravenous, Q2 Min PRN, Gregory Urena MD     nicotine (COMMIT) lozenge 2 mg, 2 mg, Buccal, Q1H PRN, Gregory Urena MD, 2 mg at 10/05/20 1805     nicotine (NICORETTE) gum 2 mg, 2 mg, Buccal, Q1H PRN, Gregory Urena MD, 2 mg at 10/04/20 2032     omeprazole (priLOSEC) CR capsule 40 mg, 40 mg, Oral, QAM AC, Gregory Urena MD, 40 mg at 10/08/20 0845     ondansetron (ZOFRAN-ODT) ODT tab 4 mg, 4 mg, Oral, Q6H PRN **OR** ondansetron (ZOFRAN) injection 4 mg, 4 mg, Intravenous, Q6H PRN, Gregory Urena MD, 4 mg at 10/07/20 1414     QUEtiapine (SEROquel) tablet 25 mg, 25 mg, Oral, At Bedtime, Shawanda Ross APRN CNP, 25 mg at 10/07/20 2053     sodium chloride (PF) 0.9% PF flush 3 mL, 3 mL, Intracatheter, q1 min prn, Gregory Urena MD     sodium chloride (PF) 0.9% PF flush 3 mL, 3 mL, Intracatheter, Q8H, Gregory Urena MD, 3 mL at 10/07/20 2027     traZODone (DESYREL) tablet 50 mg, 50 mg, Oral, At Bedtime, Shawanda Ross APRN CNP, 50 mg at 10/07/20 2053     traZODone (DESYREL) tablet 50 mg, 50 mg, Oral, At Bedtime PRN, Gregory Urena MD, 50 mg at 10/04/20 2305     vancomycin (VANCOCIN) 1,500 mg in sodium chloride 0.9 % 250 mL intermittent infusion, 1,500 mg, Intravenous, Q12H, Gregory Urena MD, Last Rate: 167 mL/hr at 10/07/20 2141, 1,500 mg at 10/08/20 1000           MSE:   APPEARANCE/GROOMING/ATTITUDE: sleeping but readily awakens  ORIENTATION: alert and oriented in all spheres  SPEECH: normal rate and rhythm.   MOVEMENTS: no tics, tremors, rigidity or abnormal movements  THOUGHT PROCESS: logial, no loosening of associations  THOUGHT CONTENT: No S, HI, AH or VH  ATTENTION/CONCENTRATION/RECALL: attention fair, recall 3/3  MOOD: \"OK\"  AFFECT: neutra;  FUND OF KNOWLEDGE: " "intact  INSIGHT: Knows she is addicted to heroin  JUDGMENT: Wants help. Plans to return to [facility] for methadone  IMPULSE CONTROL: drugs use despite consequences with medical compplications    RISK ASSESSMENT: risk to restarting IVDU and further medical issues      Vital signs:  Temp: 96.6  F (35.9  C) Temp src: Oral BP: 114/66 Pulse: 61   Resp: 18 SpO2: 97 % O2 Device: None (Room air)   Height: 157.5 cm (5' 2\") Weight: 70 kg (154 lb 6.4 oz)  Estimated body mass index is 28.24 kg/m  as calculated from the following:    Height as of this encounter: 1.575 m (5' 2\").    Weight as of this encounter: 70 kg (154 lb 6.4 oz).              DSM-5 Diagnosis:   Opiate use disorder severe and persistent, IV drug use  Stimulant use disorder, severe and persistent (methamphetamine and crack)  Cannabis use disorder, unspecified  PTSD  Substance-induced mood disorder  Bipolar disorder by history             Assessment:   35 year old female here with treatment for cellulitis due to IVDA; improving. She was on methadone taper here, now complete. . No acute withdrawal symptoms but has intermittent cravings. She is allergic to buprenorphine. She is in process of re-instating insurance and is agreeable to return to [facility] for resuming methadone program. She would need to attend this on a daily basis for carry out Rx and overall harm reduction and treatment.           Summary of Recommendations:   1. Methadone resources: [facility]  [phone]; [facility]  [phone]  [facility]. [phone].  Patient is aware of programs and location of [facility] and plans to return there for methadone for OUD.  2. Cravings are manageable at this point while inpatient.  If craving are or become an issues we could provide 10 mg daily for now, but at this point she says cravings are manageable.  Could consider giving her a dose of methadone prior to discharge with plan for her to return to [facility] addictions on Monday or within 24 hours of discharge.  She is " only eligible for in person dosing for the methadone in methadone clinic, so we would not send her out with a supply.  3. Patient was receptive to harm reduction strategies. Needle exchange: [phone]  4. No med changes    Please call if you have any question [phone]

## 2020-10-08 NOTE — PROGRESS NOTES
Orthopaedic Surgery Progress Note 10/08/2020    S: No acute events overnight.  Pain controlled on current regimen. Continues improving with only antibiotic treatment and swelling and pain continue to improve.    O:  Temp: 97.9  F (36.6  C) Temp src: Oral BP: 103/62 Pulse: 68   Resp: 19 SpO2: 98 % O2 Device: None (Room air)      Exam:  Gen: No acute distress, resting comfortably in bed, fatigued.  Resp: Non-labored breathing  MSK:  Right Upper Extremity: No deformity.  Abscess over antecubital fossa near IV and another over dorsum of hand. Dressings CDI. + erythema. + swelling. Swelling significantly decreased from previous exams. Slightly TTP over abscesses. Normal ROM wrist without pain. No pain with passive stretch wrist flexors. Motor intact distally with finger flexion/extension/intrinsics/EPL. SILT m/r/u nerve distributions. Radial pulse palpable, 2+.   Left Upper Extremity: No deformity. Abscess over over dorsum of hand. Dressings CDI. + erythema. + swelling. Swelling significantly decreased from previous exams. Slightly TTP over abscess. Normal ROM wrist without pain. No pain with passive stretch wrist flexors.  Motor intact distally with finger flexion/extension/intrinsics/EPL. SILT m/r/u nerve distributions. Radial pulse palpable, 2+.    Recent Labs   Lab 10/07/20  1140 10/05/20  0834 10/04/20  1405   WBC 9.1 7.9 17.9*   HGB 15.1 12.0 13.4    356 419   CRP 35.0* 120.0* 150.0*       Culture results: Moderate growth beta hemolytic GAS susc amp, PCN, vanc, and cephalosporins    ASSESSMENT AND PLAN:   Giselle Mackenzie is a 35 year old dominant female hxbipolar disorder and  EtOH and IV drug use with multiple abscesses over hands, forearms, axilla, and legs. Bedside irrigation and debridement performed in ED and patient initiated on IV abx.  No current plans for OR for formal I&D abscesses given response to more conservative treatment. Continues to clinically improve.     Recommendations:  - Begin hand  [id number]/ day  - Continue abx per primary  - WOC consult for further recommendations regarding wound care  - Pain control per primary  - Diet: Regular from orthopedic standpoint  - Chemical dependency service on board  - Orthopedics to continue to follow patient. Please contact with any question or concerns or if abscesses begin to worsen, if there is sudden increase in pain in hands, or inflammatory markers trend upward   - Disposition: per primary    Orthopedics will continue to follow peripherally. Please contact with any questions or concerns.      Rey Alexandra MD  Orthopaedic Surgery, PGY-1  Pager: [phone]

## 2020-10-09 LAB
HCV RNA SERPL NAA+PROBE-ACNC: ABNORMAL [IU]/ML
HCV RNA SERPL NAA+PROBE-LOG IU: 6.2 LOG IU/ML

## 2020-10-09 PROCEDURE — 250N000013 HC RX MED GY IP 250 OP 250 PS 637: Performed by: NURSE PRACTITIONER

## 2020-10-09 PROCEDURE — 250N000013 HC RX MED GY IP 250 OP 250 PS 637: Performed by: INTERNAL MEDICINE

## 2020-10-09 PROCEDURE — 258N000003 HC RX IP 258 OP 636: Performed by: INTERNAL MEDICINE

## 2020-10-09 PROCEDURE — 99232 SBSQ HOSP IP/OBS MODERATE 35: CPT | Performed by: INTERNAL MEDICINE

## 2020-10-09 PROCEDURE — 258N000003 HC RX IP 258 OP 636

## 2020-10-09 PROCEDURE — 250N000011 HC RX IP 250 OP 636: Performed by: INTERNAL MEDICINE

## 2020-10-09 PROCEDURE — 87491 CHLMYD TRACH DNA AMP PROBE: CPT | Performed by: INTERNAL MEDICINE

## 2020-10-09 PROCEDURE — 120N000002 HC R&B MED SURG/OB UMMC

## 2020-10-09 PROCEDURE — 96372 THER/PROPH/DIAG INJ SC/IM: CPT | Performed by: INTERNAL MEDICINE

## 2020-10-09 RX ORDER — TRAZODONE HYDROCHLORIDE 50 MG/1
50 TABLET, FILM COATED ORAL
Qty: 30 TABLET | Refills: 0 | Status: SHIPPED | OUTPATIENT
Start: 2020-10-09

## 2020-10-09 RX ORDER — METHADONE HYDROCHLORIDE 5 MG/5ML
10 SOLUTION ORAL DAILY
Status: COMPLETED | OUTPATIENT
Start: 2020-10-09 | End: 2020-10-10

## 2020-10-09 RX ORDER — TRAZODONE HYDROCHLORIDE 50 MG/1
50 TABLET, FILM COATED ORAL AT BEDTIME
Qty: 30 TABLET | Refills: 0 | Status: SHIPPED | OUTPATIENT
Start: 2020-10-09

## 2020-10-09 RX ORDER — SODIUM CHLORIDE 9 MG/ML
INJECTION, SOLUTION INTRAVENOUS
Status: COMPLETED
Start: 2020-10-09 | End: 2020-10-09

## 2020-10-09 RX ORDER — HYDROXYZINE HYDROCHLORIDE 50 MG/1
50 TABLET, FILM COATED ORAL EVERY 6 HOURS PRN
Qty: 30 TABLET | Refills: 0 | Status: SHIPPED | OUTPATIENT
Start: 2020-10-09

## 2020-10-09 RX ORDER — ESCITALOPRAM OXALATE 10 MG/1
10 TABLET ORAL DAILY
Qty: 30 TABLET | Refills: 3 | Status: SHIPPED | OUTPATIENT
Start: 2020-10-10

## 2020-10-09 RX ORDER — DOXYCYCLINE HYCLATE 100 MG
100 TABLET ORAL 2 TIMES DAILY
Qty: 20 TABLET | Refills: 0 | Status: SHIPPED | OUTPATIENT
Start: 2020-10-09 | End: 2020-10-19

## 2020-10-09 RX ORDER — QUETIAPINE FUMARATE 25 MG/1
25 TABLET, FILM COATED ORAL AT BEDTIME
Qty: 30 TABLET | Refills: 0 | Status: SHIPPED | OUTPATIENT
Start: 2020-10-09

## 2020-10-09 RX ORDER — ACETAMINOPHEN 325 MG/1
650 TABLET ORAL EVERY 4 HOURS PRN
Qty: 100 TABLET | Refills: 0 | Status: SHIPPED | OUTPATIENT
Start: 2020-10-09

## 2020-10-09 RX ORDER — OMEPRAZOLE 40 MG/1
40 CAPSULE, DELAYED RELEASE ORAL
Qty: 30 CAPSULE | Refills: 1 | Status: SHIPPED | OUTPATIENT
Start: 2020-10-10

## 2020-10-09 RX ADMIN — HYDROXYZINE HYDROCHLORIDE 50 MG: 25 TABLET, FILM COATED ORAL at 09:26

## 2020-10-09 RX ADMIN — TRAZODONE HYDROCHLORIDE 50 MG: 50 TABLET ORAL at 21:04

## 2020-10-09 RX ADMIN — QUETIAPINE FUMARATE 25 MG: 25 TABLET ORAL at 21:04

## 2020-10-09 RX ADMIN — HYDROXYZINE HYDROCHLORIDE 50 MG: 25 TABLET, FILM COATED ORAL at 21:04

## 2020-10-09 RX ADMIN — ACETAMINOPHEN 650 MG: 325 TABLET, FILM COATED ORAL at 18:33

## 2020-10-09 RX ADMIN — ACETAMINOPHEN 650 MG: 325 TABLET, FILM COATED ORAL at 14:18

## 2020-10-09 RX ADMIN — AMPICILLIN SODIUM AND SULBACTAM SODIUM 3 G: 2; 1 INJECTION, POWDER, FOR SOLUTION INTRAMUSCULAR; INTRAVENOUS at 21:04

## 2020-10-09 RX ADMIN — ENOXAPARIN SODIUM 40 MG: 40 INJECTION SUBCUTANEOUS at 21:04

## 2020-10-09 RX ADMIN — AMPICILLIN SODIUM AND SULBACTAM SODIUM 3 G: 2; 1 INJECTION, POWDER, FOR SOLUTION INTRAMUSCULAR; INTRAVENOUS at 16:31

## 2020-10-09 RX ADMIN — VANCOMYCIN HYDROCHLORIDE 1500 MG: 1 INJECTION, POWDER, LYOPHILIZED, FOR SOLUTION INTRAVENOUS at 10:53

## 2020-10-09 RX ADMIN — METHADONE HYDROCHLORIDE 10 MG: 5 SOLUTION ORAL at 10:53

## 2020-10-09 RX ADMIN — VANCOMYCIN HYDROCHLORIDE 1500 MG: 1 INJECTION, POWDER, LYOPHILIZED, FOR SOLUTION INTRAVENOUS at 22:26

## 2020-10-09 RX ADMIN — HYDROXYZINE HYDROCHLORIDE 50 MG: 25 TABLET, FILM COATED ORAL at 14:18

## 2020-10-09 RX ADMIN — OMEPRAZOLE 40 MG: 20 CAPSULE, DELAYED RELEASE ORAL at 09:26

## 2020-10-09 RX ADMIN — ESCITALOPRAM OXALATE 10 MG: 10 TABLET, FILM COATED ORAL at 09:26

## 2020-10-09 RX ADMIN — ACETAMINOPHEN 650 MG: 325 TABLET, FILM COATED ORAL at 09:26

## 2020-10-09 RX ADMIN — AMPICILLIN SODIUM AND SULBACTAM SODIUM 3 G: 2; 1 INJECTION, POWDER, FOR SOLUTION INTRAMUSCULAR; INTRAVENOUS at 09:26

## 2020-10-09 RX ADMIN — SODIUM CHLORIDE 500 ML: 9 INJECTION, SOLUTION INTRAVENOUS at 09:27

## 2020-10-09 NOTE — PROGRESS NOTES
"[facility], [place]   Internal Medicine Daily Note           Interval History/Events     Overnight events reviewed  Reports some nasal congestion,and aches. States may be withdrawing from heroine  No nausea, vomiting  No chest pain, shortness of breasth.        Review of Systems        4 point ROS including Respiratory, CV, GI and , other than that noted above is negative      Medications   I have reviewed current medications  in the \"current medication\" section of Epic.  Relevant changes include:     Physical Exam   General:       Vital signs:    Blood pressure 98/46, pulse 72, temperature 96.7  F (35.9  C), temperature source Oral, resp. rate 16, height 1.575 m (5' 2\"), weight 70 kg (154 lb 6.4 oz), SpO2 97 %, not currently breastfeeding.  Estimated body mass index is 28.24 kg/m  as calculated from the following:    Height as of this encounter: 1.575 m (5' 2\").    Weight as of this encounter: 70 kg (154 lb 6.4 oz).    No intake or output data in the 24 hours ending 10/08/20 1508     Constitutional: Laying in bed in no acute distress  Eye: No icterus, no pallor  Mouth/ENT: Normal oral mucosa  Cardiovascular: S1, S2 normal.   Respiratory: B/L CTA  GI: Soft, NT, BS+  : No schulz's catheter  Neurology: Alert,a wake, and oriented.   Psych:   MSK: Hand swelling and redness is improing  Integumentary:   Heme/Lymph/Imm:      Laboratory and Imaging Studies     I have reviewed  laboratory and imaging studies in the Epic. Pertinent findings are as below:    BMP  Recent Labs   Lab 10/07/20  1140 10/05/20  0834 10/04/20  1957 10/04/20  1405    137  --  135   POTASSIUM 4.1 4.4  --  4.8   CHLORIDE 107 112*  --  102   BRYANT 9.1 7.8*  --  8.4*   CO2 25 21  --  25   BUN 11 8  --  10   CR 0.65 0.55 0.77 0.58   * 101*  --  79     CBC  Recent Labs   Lab 10/07/20  1140 10/05/20  0834 10/04/20  1405   WBC 9.1 7.9 17.9*   RBC 5.31* 4.11 4.60   HGB 15.1 12.0 13.4   HCT 46.5 37.6 40.1   MCV 88 92 " 87   MCH 28.4 29.2 29.1   MCHC 32.5 31.9 33.4   RDW 14.1 14.2 13.7    356 419     INRNo lab results found in last 7 days.  LFTs  Recent Labs   Lab 10/05/20  0834 10/04/20  1405   ALKPHOS 65 89   AST 38 42   ALT 65* 79*   BILITOTAL 0.3 0.5   PROTTOTAL 6.6* 7.7   ALBUMIN 2.4* 3.4      PANCNo lab results found in last 7 days.        Impression/Plan          35 year old female who was admitted on 10/4/2020.      35 year old female admitted on 10/4/2020. She PMH for cellulitis, asthma, GERD, PTSD, MRSA, OCD, bipolar disorder, ADD, EtOH and IV drug use who presents to the ED with complaint of cellulitis - hands, arms, diarrhea and left armpit abscess.     She is S/P I&D of both hand abscesses ( Dorsum) in the ED by ED provider as advised by orthopedic team.     Individual problems and their management are outlined below     # Multiple bilateral upper extremity abscess:     Cause of abscesses being IVDU.2 large abscesses on the dorsum of both hands now s/p  I & D on 10/04 in ED. She has multiple other abscesses in evolution at various stages in both her arms ( please see separate picture from ER provider note). More importantly, patient is also at risk for bacteremia. Wound culture and blood cultures were sent. Patient received IV ceftriaxone, vancomycin and Metronidazole in the ER. She was resumed on  IV vanc and ceftriaxone on admission. She was started on oral Tylenol and IV ketorolac for pain management.    - Continue Unasyn for now   - Transition to Po doxy 100 BID and augmentin 875/125 BId on discharge to complete 14 days or until 10/19         # Hx of  IVDU     IV drug of choice is IV heroin and Methamphetamine  Urine analysis is positive amphetamine, Cocaine, opiates and Cannabinoids  She was started on Methadone 30, 20, 10 taper.   Patient reports some nasal congestion,and aches. Psychiatry consulted. Advised to start on Methadone 10 mg on 10/09,and give 10 mg on 10/10, and advise follow up with Methadone  clinic.   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               Wilfrid Iqbal MD  Hospitalist ( Internal medicine)  Pager: [phone]

## 2020-10-09 NOTE — PLAN OF CARE
VSS. Alert and oriented x4. Pt cooperative with cares and interactive with staff when asked direct questions. Rested most of the morning. New IV placed in left forearm- saline locked between antibiotics. IV antibiotics given, plan to transition to orals most likely tomorrow. Pt reported generalized body aches and some anxiety- improved with scheduled methadone and PRN Tylenol and Atarax. Pt more active today, ambulated in the halls and showered. Dressings changed per orders. Regular diet, tolerating well. Pt able to make all needs known, call light in reach.

## 2020-10-09 NOTE — PLAN OF CARE
Cooperative with drsg changes.Swelling on both hands improving.Wounds are drying up.Up ad radha in room.Eating well.LBM 10/08.Voiding spontaneously.Requested for tylenol for generalized body discomfort.Otherwise,sleeping mostly.  On IV abx and needing new PIV line but have been declining inspite educ on risks/benefits,delaying placement to 2AM but refused again when approached by nurse flyer,yelling to staff for waking her up.Will try again in the morning ,otherwise,might likely be switched to oral abx.

## 2020-10-09 NOTE — PLAN OF CARE
"Patient refuse PIV insertion, \"Can't we do this in the morning?  Why are doing this at 2am?\"    "

## 2020-10-09 NOTE — PROGRESS NOTES
Brief ID Signoff note:    34 y/o woman with LILY including IVDU admitted with BL hand abscesses s/p I/D. Cultures revealed GAS; has multiple nodules that for which I/D was not indicated so broad-spectrum coverage elected with vanco and amp/sulbactam. Doing well. HCV Ab pos, has been referred to hepatology in the past. HIV neg, RPR neg with so pos Ab reflects prior infection that has been treated.  Gonorrhea neg, Chlamydia apparently not done so I've ordered. HebB immune, but HAV non-immune.    Final recs:  -consider influenza and HAV immunizations at discharge  -at discharge okay to transition to Po doxy 100 BID and augmentin 875/125 BId to complete 14 days or until 10/19  -If chalmydia positive, suggest azithro 1g as 1st line therapy, though if results are belated doxy for 7 days would cover (so assuming she is adherent to her SSTI regimen she will be treated)    Thanks for this consult. ID will sign off, but please page with additional questions!    Jen Duval MD   of Medicine, Division of Infectious Diseases  [facility] [phone]

## 2020-10-10 VITALS
DIASTOLIC BLOOD PRESSURE: 62 MMHG | SYSTOLIC BLOOD PRESSURE: 105 MMHG | BODY MASS INDEX: 28.41 KG/M2 | TEMPERATURE: 97 F | WEIGHT: 154.4 LBS | RESPIRATION RATE: 16 BRPM | HEIGHT: 62 IN | OXYGEN SATURATION: 95 % | HEART RATE: 60 BPM

## 2020-10-10 LAB
ANION GAP SERPL CALCULATED.3IONS-SCNC: 6 MMOL/L (ref 3–14)
BACTERIA SPEC CULT: NO GROWTH
BASOPHILS # BLD AUTO: 0.1 10E9/L (ref 0–0.2)
BASOPHILS NFR BLD AUTO: 1.1 %
BUN SERPL-MCNC: 14 MG/DL (ref 7–30)
CALCIUM SERPL-MCNC: 8.6 MG/DL (ref 8.5–10.1)
CHLORIDE SERPL-SCNC: 107 MMOL/L (ref 94–109)
CO2 SERPL-SCNC: 26 MMOL/L (ref 20–32)
CREAT SERPL-MCNC: 0.62 MG/DL (ref 0.52–1.04)
CRP SERPL-MCNC: 5.8 MG/L (ref 0–8)
DIFFERENTIAL METHOD BLD: NORMAL
EOSINOPHIL # BLD AUTO: 0.3 10E9/L (ref 0–0.7)
EOSINOPHIL NFR BLD AUTO: 3.1 %
ERYTHROCYTE [DISTWIDTH] IN BLOOD BY AUTOMATED COUNT: 13.9 % (ref 10–15)
GFR SERPL CREATININE-BSD FRML MDRD: >90 ML/MIN/{1.73_M2}
GLUCOSE SERPL-MCNC: 71 MG/DL (ref 70–99)
HCT VFR BLD AUTO: 41.4 % (ref 35–47)
HGB BLD-MCNC: 13.7 G/DL (ref 11.7–15.7)
IMM GRANULOCYTES # BLD: 0.1 10E9/L (ref 0–0.4)
IMM GRANULOCYTES NFR BLD: 1.6 %
LYMPHOCYTES # BLD AUTO: 3.4 10E9/L (ref 0.8–5.3)
LYMPHOCYTES NFR BLD AUTO: 40.3 %
MCH RBC QN AUTO: 29 PG (ref 26.5–33)
MCHC RBC AUTO-ENTMCNC: 33.1 G/DL (ref 31.5–36.5)
MCV RBC AUTO: 88 FL (ref 78–100)
MONOCYTES # BLD AUTO: 0.9 10E9/L (ref 0–1.3)
MONOCYTES NFR BLD AUTO: 10.7 %
NEUTROPHILS # BLD AUTO: 3.7 10E9/L (ref 1.6–8.3)
NEUTROPHILS NFR BLD AUTO: 43.2 %
NRBC # BLD AUTO: 0 10*3/UL
NRBC BLD AUTO-RTO: 0 /100
PLATELET # BLD AUTO: 415 10E9/L (ref 150–450)
POTASSIUM SERPL-SCNC: 4 MMOL/L (ref 3.4–5.3)
RBC # BLD AUTO: 4.72 10E12/L (ref 3.8–5.2)
SODIUM SERPL-SCNC: 139 MMOL/L (ref 133–144)
SPECIMEN SOURCE: NORMAL
T PALLIDUM AB SER QL AGGL: REACTIVE
WBC # BLD AUTO: 8.5 10E9/L (ref 4–11)

## 2020-10-10 PROCEDURE — 250N000013 HC RX MED GY IP 250 OP 250 PS 637: Performed by: INTERNAL MEDICINE

## 2020-10-10 PROCEDURE — 90632 HEPA VACCINE ADULT IM: CPT | Performed by: INTERNAL MEDICINE

## 2020-10-10 PROCEDURE — 36415 COLL VENOUS BLD VENIPUNCTURE: CPT | Performed by: INTERNAL MEDICINE

## 2020-10-10 PROCEDURE — 86140 C-REACTIVE PROTEIN: CPT | Performed by: INTERNAL MEDICINE

## 2020-10-10 PROCEDURE — 250N000011 HC RX IP 250 OP 636: Performed by: INTERNAL MEDICINE

## 2020-10-10 PROCEDURE — 99239 HOSP IP/OBS DSCHRG MGMT >30: CPT | Performed by: INTERNAL MEDICINE

## 2020-10-10 PROCEDURE — 80048 BASIC METABOLIC PNL TOTAL CA: CPT | Performed by: INTERNAL MEDICINE

## 2020-10-10 PROCEDURE — 90472 IMMUNIZATION ADMIN EACH ADD: CPT | Performed by: INTERNAL MEDICINE

## 2020-10-10 PROCEDURE — G0008 ADMIN INFLUENZA VIRUS VAC: HCPCS | Performed by: INTERNAL MEDICINE

## 2020-10-10 PROCEDURE — 90686 IIV4 VACC NO PRSV 0.5 ML IM: CPT | Performed by: INTERNAL MEDICINE

## 2020-10-10 PROCEDURE — 90471 IMMUNIZATION ADMIN: CPT | Performed by: INTERNAL MEDICINE

## 2020-10-10 PROCEDURE — 85049 AUTOMATED PLATELET COUNT: CPT | Performed by: INTERNAL MEDICINE

## 2020-10-10 PROCEDURE — 250N000013 HC RX MED GY IP 250 OP 250 PS 637: Performed by: NURSE PRACTITIONER

## 2020-10-10 PROCEDURE — 85025 COMPLETE CBC W/AUTO DIFF WBC: CPT | Performed by: INTERNAL MEDICINE

## 2020-10-10 RX ADMIN — AMPICILLIN SODIUM AND SULBACTAM SODIUM 3 G: 2; 1 INJECTION, POWDER, FOR SOLUTION INTRAMUSCULAR; INTRAVENOUS at 09:09

## 2020-10-10 RX ADMIN — AMPICILLIN SODIUM AND SULBACTAM SODIUM 3 G: 2; 1 INJECTION, POWDER, FOR SOLUTION INTRAMUSCULAR; INTRAVENOUS at 02:49

## 2020-10-10 RX ADMIN — HEPATITIS A VACCINE 1440 UNITS: 1440 INJECTION, SUSPENSION INTRAMUSCULAR at 10:39

## 2020-10-10 RX ADMIN — HYDROXYZINE HYDROCHLORIDE 50 MG: 25 TABLET, FILM COATED ORAL at 02:49

## 2020-10-10 RX ADMIN — METHADONE HYDROCHLORIDE 10 MG: 5 SOLUTION ORAL at 08:06

## 2020-10-10 RX ADMIN — INFLUENZA A VIRUS A/GUANGDONG-MAONAN/SWL1536/2019 CNIC-1909 (H1N1) ANTIGEN (FORMALDEHYDE INACTIVATED), INFLUENZA A VIRUS A/HONG KONG/2671/2019 (H3N2) ANTIGEN (FORMALDEHYDE INACTIVATED), INFLUENZA B VIRUS B/PHUKET/3073/2013 ANTIGEN (FORMALDEHYDE INACTIVATED), AND INFLUENZA B VIRUS B/WASHINGTON/02/2019 ANTIGEN (FORMALDEHYDE INACTIVATED) 0.5 ML: 15; 15; 15; 15 INJECTION, SUSPENSION INTRAMUSCULAR at 10:41

## 2020-10-10 RX ADMIN — OMEPRAZOLE 40 MG: 20 CAPSULE, DELAYED RELEASE ORAL at 08:06

## 2020-10-10 RX ADMIN — ESCITALOPRAM OXALATE 10 MG: 10 TABLET, FILM COATED ORAL at 08:06

## 2020-10-10 RX ADMIN — ACETAMINOPHEN 650 MG: 325 TABLET, FILM COATED ORAL at 02:49

## 2020-10-10 NOTE — PROGRESS NOTES
D: Pt  A/O x4. VSS . Lungs- CL, no c/o chest pain/ SOB. sats=95  % RA.  . Bowel+. PP- +. +2 edema- bilat hands. CMS- intact. Pt taking PO REG  food/ fluids well. Voiding Good amounts in BR. Pt up walks INDEPENDENTLY. Pain/CAPA -denies.   HAND  Dressing - C/D/I. All Discharge MEDS and instructions reviewed with pt and meds and HAND WASH SUPPLIES sent with pt.  A: con't to monitor. Call light in reach. Pt able to make needs known.Pt Dcd at this posted time.

## 2020-10-10 NOTE — PLAN OF CARE
"    VS:   /55 (BP Location: Left arm)   Pulse 53   Temp 96.6  F (35.9  C) (Oral)   Resp 14   Ht 1.575 m (5' 2\")   Wt 70 kg (154 lb 6.4 oz)   SpO2 96%   BMI 28.24 kg/m    VSS on RA, Denies CP/SOB    Output:   Spontaneously voiding, LBM 10/8/20 passing flatus    Lungs LS clear equal bilaterally    Activity:   Independent    Skin: Intact ex: L/R hand edema/erythema cellulitis, R forearm scab, L armpit wound    Pain:   PRN Tylenol and atarax given X1 overnight    Neuro/CMS:   A&Ox4, pt lethargic and slept most of night.    Dressing(s):   CDI    Diet:   Regular    LDA:   PIV infusing TKO between ABX    Equipment:   IV pole,    Plan:   Continue POC, Pt able to make needs known and call light is within reach. Will Discharge on PO abx    Additional Info:   Contact precautions maintained.        "

## 2020-10-10 NOTE — DISCHARGE SUMMARY
Discharge Summary    Giselle Mackenzie MRN# [id number]   YOB: 1985 Age: 35 year old     Date of Admission:  10/4/2020  Date of Discharge:  10/10/2020 11:00 AM  Admitting Physician:  Gregory Umaña MD  Discharge Physician:  Wilfrid Iqbal MD   Discharging Service:  Internal Medicine     Primary Provider: Mary Carter          Discharge Diagnosis:   B/l hand and upper extremity abscess  IVDU  Hx of Positive TPPA  GERD  PTSD                Brief History of Illness:     Giselle Mackenzie is a 35 year old female who was admitted for     For more details, please refer to the admission H&P on 10/4/2020             Hospital Course:     35 year old female who was admitted on 10/4/2020.      35 year old female admitted on 10/4/2020. She PMH for cellulitis, asthma, GERD, PTSD, MRSA, OCD, bipolar disorder, ADD, EtOH and IV drug use who presents to the ED with complaint of cellulitis - hands, arms, diarrhea and left armpit abscess.     She is S/P I&D of both hand abscesses ( Dorsum) in the ED by ED provider as advised by orthopedic team.     Individual problems and their management are outlined below     # Multiple bilateral upper extremity abscess:     Cause of abscesses being IVDU.2 large abscesses on the dorsum of both hands now s/p  I & D on 10/04 in ED. She has multiple other abscesses in evolution at various stages in both her arms ( please see separate picture from ER provider note). More importantly, patient is also at risk for bacteremia. Wound culture and blood cultures were sent. Patient received IV ceftriaxone, vancomycin and Metronidazole in the ER. She was resumed on  IV vanc and ceftriaxone on admission. She was started on oral Tylenol and IV ketorolac for pain management.  ID was consulted. Antibioitcs were changed to Unasyn as abscess culture grew GAS, and GPC in pair  - Transition to Po doxy 100 BID and augmentin 875/125 BId on discharge to complete 14 days or until 10/19  -  Dressing changes as instructed         # Hx of  IVDU   IV drug of choice is IV heroin and Methamphetamine  Urine analysis is positive amphetamine, Cocaine, opiates and Cannabinoids  She was treated with Methadone. Psych was consulted. Advise to follow up with Methadone clinic  - Follow up with Methadone clinic  - Influenza and HAV immunizations were given on discharge  - Chlamydia PCR pending on discharge        # Hx of Positive TPPA: last RPR in 10/2017 1:2, now RPR negative thus no evidence of syphilis re-infection     # Tobacco abuse:     Patient smokes about 2-3 pcs of cigarettes/ day  Continue Patch + gum for now      # GERD: Omeprazole 40 mg every morning      # PTSD, OCD, Bipolar disorder and ADD                  # Substance-induced mood disorder  On Seroquel, Trazodone  - Continue      # Diet: Combination Diet Regular Diet Adult    # DVT Prophylaxis: Enoxaparin (Lovenox) SQ  # Pablo Catheter: not present                  # Code Status: Full Code                      Discharge Medications:     Current Discharge Medication List      START taking these medications    Details   acetaminophen (TYLENOL) 325 MG tablet Take 2 tablets (650 mg) by mouth every 4 hours as needed for mild pain  Qty: 100 tablet, Refills: 0    Associated Diagnoses: Abscess of hand      amoxicillin-clavulanate (AUGMENTIN) 875-125 MG tablet Take 1 tablet by mouth 2 times daily for 10 days  Qty: 20 tablet, Refills: 0    Associated Diagnoses: Abscess of hand      doxycycline hyclate (VIBRA-TABS) 100 MG tablet Take 1 tablet (100 mg) by mouth 2 times daily for 10 days  Qty: 20 tablet, Refills: 0    Associated Diagnoses: Abscess of hand      escitalopram (LEXAPRO) 10 MG tablet Take 1 tablet (10 mg) by mouth daily  Qty: 30 tablet, Refills: 3    Associated Diagnoses: Anxiety      hydrOXYzine (ATARAX) 50 MG tablet Take 1 tablet (50 mg) by mouth every 6 hours as needed for anxiety  Qty: 30 tablet, Refills: 0    Associated Diagnoses: Anxiety     "  omeprazole (PRILOSEC) 40 MG DR capsule Take 1 capsule (40 mg) by mouth every morning (before breakfast)  Qty: 30 capsule, Refills: 1    Associated Diagnoses: Dyspepsia      QUEtiapine (SEROQUEL) 25 MG tablet Take 1 tablet (25 mg) by mouth At Bedtime  Qty: 30 tablet, Refills: 0    Associated Diagnoses: Anxiety      !! traZODone (DESYREL) 50 MG tablet Take 1 tablet (50 mg) by mouth At Bedtime  Qty: 30 tablet, Refills: 0    Associated Diagnoses: Anxiety      !! traZODone (DESYREL) 50 MG tablet Take 1 tablet (50 mg) by mouth nightly as needed for sleep  Qty: 30 tablet, Refills: 0    Associated Diagnoses: Anxiety       !! - Potential duplicate medications found. Please discuss with provider.              Procedures/Consultations:           Final Day of Progress before Discharge:     Reports doing well  Hand is healing well  No nausea, vmoiting  Tolerating diet well  No fever, chills.     Physical Exam:  Blood pressure 105/62, pulse 60, temperature 97  F (36.1  C), temperature source Oral, resp. rate 16, height 1.575 m (5' 2\"), weight 70 kg (154 lb 6.4 oz), SpO2 95 %, not currently breastfeeding.      onstitutional: Laying in bed in no acute distress  Eye: No icterus, no pallor  Mouth/ENT: Normal oral mucosa  Cardiovascular: S1, S2 normal.   Respiratory: B/L CTA  GI: Soft, NT, BS+  : No schulz's catheter  Neurology: Alert,a wake, and oriented.   Psych:   MSK: Hand swelling and redness is healing well. No drainge, redness or tenderness  Integumentary:   Heme/Lymph/Imm:     Data:  All laboratory data reviewed             Condition on Discharge:   Discharge condition: Stable   Code status on discharge: Full Code                Discharge Disposition:   Discharged to home               Pending Results:   Unresulted Labs Ordered in the Past 30 Days of this Admission     Date and Time Order Name Status Description    10/9/2020 1432 Chlamydia trachomatis PCR In process     10/7/2020 1140 Treponema pallidum antibody confirm In " process     10/6/2020 1700 Treponema Abs w Reflex to RPR and Titer In process     10/6/2020 1700 HIV Antigen Antibody Combo In process     10/6/2020 1700 Hepatitis C RNA quantitative In process     10/6/2020 1700 Hepatitis B surface antigen In process     10/6/2020 1155 Treponema Abs w Reflex to RPR and Titer In process     10/6/2020 1155 HIV Antigen Antibody Combo In process     10/6/2020 1155 Hepatitis C RNA quantitative In process     10/6/2020 1155 Hepatitis B surface antigen In process                   Discharge Instructions and Follow-Up:     Discharge Procedure Orders   Reason for your hospital stay   Order Comments: Admitted for b/l hand abscess requiring I and D,a dn IV antibitoics  Also treated for heroine withdrawal, Anxiety     Adult [facility] Follow-up and recommended labs and tests   Order Comments: Follow up with primary care provider, Mary Carter, within 10 days for hospital follow- up.  The following labs/tests are recommended: CBC, BMP.    Follow up with Methadone clinic on Monday 10/12    Appointments on [place] and/or [facility] (with [facility] or [facility] provider or service). Call [phone] if you haven't heard regarding these appointments within 7 days of discharge.     Activity   Order Comments: Your activity upon discharge: activity as tolerated     Order Specific Question Answer Comments   Is discharge order? Yes      Monitor and record   Order Comments: Watch for fever, chills, increased pain, swelling, drainage, redness  If these symptoms occurs, come to ED     Wound care   Order Comments: Bilateral arm and hand wounds: Apply Vashe (order #[id number]) soaked gauze to open areas and intact abscesses and leave for 10 minutes. Do not rinse. Cover hand abscesses and Right AC wounds with Xeroform (order #[id number]). Secure with Kerlix.    Soak hands in warm soapy water qid.  Cover w adaptic and kerlix    Axilla wounds: Daily : Apply Vashe (order #[id number]) soaked gauze to axilla (wounds and  intact skin) and leave for 10 minutes. Do not rinse.     Full Code     Order Specific Question Answer Comments   Code status determined by: Discussion with patient/ legal decision maker      Diet   Order Comments: Follow this diet upon discharge: Orders Placed This Encounter      Combination Diet Regular Diet Adult     Order Specific Question Answer Comments   Is discharge order? Yes           Attestation:  Wilfrid Iqbal MD.    Time spent on patient: 50 minutes total including face to face and coordinating care time reviewing current illness, any medication changes, and the care plan for today.

## 2020-10-10 NOTE — PLAN OF CARE
"[id number]    Vitals: BP 98/46 (BP Location: Left arm)   Pulse 72   Temp 96.7  F (35.9  C) (Oral)   Resp 16   Ht 1.575 m (5' 2\")   Wt 70 kg (154 lb 6.4 oz)   SpO2 97%   BMI 28.24 kg/m       Lung sounds clear. Denies CP, SOB.   Output: Voiding spontaneously w/out difficulty  Last BM: 10/8   Activity: Independent    Skin: Intact. Ex cellulitis on bilateral hands, L arm pit, R forearm scab   Pain: Denies   CMS/Neuro: Intact. A&O x 4.   Dressing: CDI   Diet: Regular   LDA: PIV- infusing abx   Equipment: IV pump/pole   Plan/Add'l info: Able to make needs known. Call light within reach. Continue with POC.  Discharge plan: TBD         "

## 2020-10-11 ENCOUNTER — PATIENT OUTREACH (OUTPATIENT)
Dept: CARE COORDINATION | Facility: CLINIC | Age: 35
End: 2020-10-11

## 2020-10-11 LAB
C TRACH DNA SPEC QL NAA+PROBE: NEGATIVE
SPECIMEN SOURCE: NORMAL

## 2020-10-13 NOTE — PROGRESS NOTES
Attempted to contact the patient x3 for post-hospital call without answer. Will close encounter at this time.    Mary Ellison CMA, [facility]  Post Hospital Discharge Team

## 2020-11-22 ENCOUNTER — HEALTH MAINTENANCE LETTER (OUTPATIENT)
Age: 35
End: 2020-11-22

## 2020-12-16 ENCOUNTER — HOSPITAL ENCOUNTER (EMERGENCY)
Facility: CLINIC | Age: 35
Discharge: HOME | End: 2020-12-16
Attending: EMERGENCY MEDICINE | Admitting: EMERGENCY MEDICINE
Payer: MEDICAID

## 2020-12-16 VITALS
TEMPERATURE: 98.5 F | HEART RATE: 103 BPM | RESPIRATION RATE: 16 BRPM | OXYGEN SATURATION: 99 % | DIASTOLIC BLOOD PRESSURE: 82 MMHG | SYSTOLIC BLOOD PRESSURE: 112 MMHG

## 2020-12-16 DIAGNOSIS — L02.419 ABSCESS OF ARM: ICD-10-CM

## 2020-12-16 PROCEDURE — 250N000013 HC RX MED GY IP 250 OP 250 PS 637: Performed by: EMERGENCY MEDICINE

## 2020-12-16 PROCEDURE — 99283 EMERGENCY DEPT VISIT LOW MDM: CPT | Mod: 25 | Performed by: EMERGENCY MEDICINE

## 2020-12-16 PROCEDURE — 64450 NJX AA&/STRD OTHER PN/BRANCH: CPT | Performed by: EMERGENCY MEDICINE

## 2020-12-16 PROCEDURE — 250N000009 HC RX 250: Performed by: EMERGENCY MEDICINE

## 2020-12-16 RX ORDER — CLINDAMYCIN HCL 300 MG
300 CAPSULE ORAL 3 TIMES DAILY
Qty: 30 CAPSULE | Refills: 0 | Status: SHIPPED | OUTPATIENT
Start: 2020-12-16 | End: 2020-12-26

## 2020-12-16 RX ORDER — CLINDAMYCIN HCL 300 MG
300 CAPSULE ORAL ONCE
Status: COMPLETED | OUTPATIENT
Start: 2020-12-16 | End: 2020-12-16

## 2020-12-16 RX ORDER — LIDOCAINE HYDROCHLORIDE AND EPINEPHRINE 10; 10 MG/ML; UG/ML
5 INJECTION, SOLUTION INFILTRATION; PERINEURAL ONCE
Status: COMPLETED | OUTPATIENT
Start: 2020-12-16 | End: 2020-12-16

## 2020-12-16 RX ORDER — SACCHAROMYCES BOULARDII 250 MG
250 CAPSULE ORAL 2 TIMES DAILY
Qty: 20 CAPSULE | Refills: 0 | Status: SHIPPED | OUTPATIENT
Start: 2020-12-16 | End: 2020-12-26

## 2020-12-16 RX ADMIN — LIDOCAINE HYDROCHLORIDE AND EPINEPHRINE 5 ML: 10; 10 INJECTION, SOLUTION INFILTRATION; PERINEURAL at 21:39

## 2020-12-16 RX ADMIN — CLINDAMYCIN HYDROCHLORIDE 300 MG: 300 CAPSULE ORAL at 21:38

## 2020-12-16 NOTE — ED AVS SNAPSHOT
[facility] Emergency Department  [address]  [place] [address]  Phone: [phone]  Fax: [phone]                                    Giselle Mackenzie   MRN: [id number]    Department: [facility] Emergency Department   Date of Visit: 12/16/2020           After Visit Summary Signature Page    I have received my discharge instructions, and my questions have been answered. I have discussed any challenges I see with this plan with the nurse or doctor.    ..........................................................................................................................................  Patient/Patient Representative Signature      ..........................................................................................................................................  Patient Representative Print Name and Relationship to Patient    ..................................................               ................................................  Date                                   Time    ..........................................................................................................................................  Reviewed by Signature/Title    ...................................................              ..............................................  Date                                               Time          22EPIC Rev 08/18

## 2020-12-17 NOTE — ED NOTES
"Discharge instruction discussed with pt. Pt's questions were addressed appropriately. Pt was given patient's relations phone number \" I have other concerns\"  "

## 2020-12-17 NOTE — ED PROVIDER NOTES
ED Provider Note  [facility]      History     Chief Complaint   Patient presents with     Wound Infection     The history is provided by the patient and medical records.     Giselle Mackenzie is a 35 year old female with a history of substance abuse who presents to the Emergency Department with right forearm abscess. Patient uses IV heroin. She states 5 days ago she noticed an abscess on her right forearm. She has recurrent abscesses. Patient also reports sinus pressure the past 3 weeks. Patient has a recent admission from 10/4-10/10/2020 for hand cellulitis s/p bilateral hand abscesses. She was discharged on doxycycline and Augmentin.    Past Medical History  Past Medical History:   Diagnosis Date     ADD (attention deficit disorder)      Asthma      Bipolar disorder, unspecified (H)      Cellulitis 2013     Cervical high risk HPV (human papillomavirus) test positive 12/07/2017 12/07/17: NIL pap, + HR HPV (not 16 or 18) result.      GERD (gastroesophageal reflux disease)      H/O ETOH abuse      H/O intravenous drug use in remission      OCD (obsessive compulsive disorder)      PTSD (post-traumatic stress disorder)      Past Surgical History:   Procedure Laterality Date     HAND SURGERY Right     MRSA abscess drainage     HC TOOTH EXTRACTION W/FORCEP       IRRIGATION AND DEBRIDEMENT BONE UPPER EXTREMITY, COMBINED Right 3/4/2019    Procedure: IRRIGATION AND DEBRIDEMENT RIGHT FOREARM ABSCESS;  Surgeon: Gideon Black MD;  Location: [facility] OR          clindamycin (CLEOCIN) 300 MG capsule       escitalopram (LEXAPRO) 10 MG tablet       hydrOXYzine (ATARAX) 50 MG tablet       omeprazole (PRILOSEC) 40 MG DR capsule       QUEtiapine (SEROQUEL) 25 MG tablet       saccharomyces boulardii (FLORASTOR) 250 MG capsule       traZODone (DESYREL) 50 MG tablet       traZODone (DESYREL) 50 MG tablet       acetaminophen (TYLENOL) 325 MG tablet      Allergies   Allergen Reactions     Sulfa Drugs  "Anaphylaxis     Latex Hives     Nickel Hives     Suboxone      fainting     Family History  Family History   Problem Relation Age of Onset     Diabetes Mother      Cervical Cancer Maternal Grandmother      Cerebrovascular Disease Maternal Grandfather      Alcoholism Paternal Uncle      Cirrhosis Paternal Uncle      Alcoholism Paternal Uncle      Cirrhosis Paternal Uncle      Migraines Paternal Grandfather      Coronary Artery Disease Early Onset Paternal Grandfather 55     Colon Cancer No family hx of      Social History   Social History     Tobacco Use     Smoking status: Current Every Day Smoker     Packs/day: 2.00     Smokeless tobacco: Never Used   Substance Use Topics     Alcohol use: No     Drug use: Yes     Types: Marijuana, Methamphetamines, Cocaine, IV, \"Crack\" cocaine      Past medical history, past surgical history, medications, allergies, family history, and social history were reviewed with the patient. No additional pertinent items.       Review of Systems  A complete review of systems was performed with pertinent positives and negatives noted in the HPI, and all other systems negative.    Physical Exam   BP: 125/71  Pulse: 100  Temp: 95.3  F (35.2  C)  Resp: 16  SpO2: 100 %  Physical Exam  Vitals signs and nursing note reviewed.   Constitutional:       General: She is not in acute distress.     Appearance: She is well-developed. She is not ill-appearing, toxic-appearing or diaphoretic.      Comments: Comfortably resting, lying in bed, NAD, nondiaphoretic, lucid, fully conversant, no  respiratory distress, alert and oriented.     HENT:      Head: Normocephalic and atraumatic.      Mouth/Throat:      Mouth: Mucous membranes are moist.   Eyes:      General: No scleral icterus.  Neck:      Musculoskeletal: Normal range of motion and neck supple.   Cardiovascular:      Rate and Rhythm: Normal rate.      Pulses: Normal pulses.   Pulmonary:      Effort: Pulmonary effort is normal. No respiratory distress. "   Skin:     General: Skin is warm and dry.      Capillary Refill: Capillary refill takes less than 2 seconds.      Comments: 3cm abscess of right anterior forearm just distal to the antecubital fossa without extension of erythema.   Neurological:      Mental Status: She is alert and oriented to person, place, and time.         ED Course      Procedures                         No results found for any visits on 12/16/20.  Medications   lidocaine 1% with EPINEPHrine 1:100,000 injection 5 mL (5 mLs Intradermal Given by Other Clinician 12/16/20 2139)   clindamycin (CLEOCIN) capsule 300 mg (300 mg Oral Given 12/16/20 2138)        Assessments & Plan (with Medical Decision Making)   This is a 35-year-old female patient coming into the emergency room stating that she has had an abscess in her right forearm for the past 3 to 5 days.  She states that she has been injecting heroin into the arm.  She states that she has had abscesses before in the past.  On physical exam she has a 3 cm abscess on the anterior aspect of the right forearm just distal to the antecubital space.  There is obvious fluctuance with no significant extension.  I discussed that the best course of treatment would be to do an incision and drainage.  The patient agreed with this.  Local anesthesia was placed in to the subcutaneous space just above the abscess.  When I attempted to do the incision the patient aggressively withdrew her right arm.  I expressed to the patient that this is a very dangerous thing that she really needs to keep still.  Patient stated that she did not want to do the incision and drainage anymore.  I stated that the other option would be to do oral antibiotics.  Patient wanted me to inject several more times with lidocaine and I expressed that this is not necessary.  She also told the nurse that she was concerned that we did not draw blood which was also discussed with the patient and was deemed unnecessary for simple abscess.  At  this time I did review her chart from previous hospitalizations and ED visits.  I did review her medications and allergies.  Her tetanus is up-to-date.  She will be provided with a dose of clindamycin here in the emergency room and discharged with a prescription for Clinda has encouraged to follow-up with her primary care physician as soon as possible for reassessment.  Pt was discharged home/self-care.  PT was provided written discharge instructions. Additionally verbal instructions were given and discussed with patient.  PT was asked to return to the ED immediately for any new or concerning symptoms.  Pt was in agreement, endorsed understanding, and questions were answered.    I have reviewed the nursing notes. I have reviewed the findings, diagnosis, plan and need for follow up with the patient.    New Prescriptions    CLINDAMYCIN (CLEOCIN) 300 MG CAPSULE    Take 1 capsule (300 mg) by mouth 3 times daily for 10 days    SACCHAROMYCES BOULARDII (FLORASTOR) 250 MG CAPSULE    Take 1 capsule (250 mg) by mouth 2 times daily for 10 days       Final diagnoses:   Abscess of arm     I, Rosita Bradford, am serving as a trained medical scribe to document services personally performed by Carlton Duarte MD, based on the provider's statements to me.      I, Carlton Duarte MD, was physically present and have reviewed and verified the accuracy of this note documented by Rosita Bradford.     --  Carlton Duarte MD  formerly [facility] EMERGENCY DEPARTMENT  12/16/2020     Carlton Duarte MD  12/16/20 [id number]

## 2021-09-19 ENCOUNTER — HEALTH MAINTENANCE LETTER (OUTPATIENT)
Age: 36
End: 2021-09-19

## 2022-01-02 NOTE — LETTER
"3/5/2018       RE: Giselle Mackenzie  [address]  [facility] [address]     Dear Colleague,    Thank you for referring your patient, Giselle Mackenzie, to the [facility] HEPATOLOGY at [facility]. Please see a copy of my visit note below.    [facility]    Hepatology New Patient Visit    Referring provider:  Tahira Daly      32 year old female    Chief complaint:  Chronic hepatitis C    HPI:  HCV  - dx 2016  - GT?  - hx IVDU 2008-17  - HCV RNA neg 10/4/16  - no prior liver bx  - no prior antiviral therapy    Patient comes to clinic this AM for evaluation and management of chronic hepatitis C.  She likely acquired this via IV DU between Oct 2016 and mid 2017.  Of note, HCV RNA was negative in Oct 2016.  She has never had a liver biopsy and has never been treated for hepatitis C.    Patient is well today.  She denies any signs or symptoms specific to liver disease.    Patient denies jaundice, abdominal distension, lower extremity edema, lethargy or confusion.    No history of melena, hematemesis or hematochezia.    Patient denies fevers, sweats or chills.    Patient has gained 90lbs since Aug 2017.    Patient had a baby boy in Oct 2017 who is now in foster care.  She had no problems during pregnancy.    Patient has been sober since Aug 13th 2017.  She completed a 4 month inpatient treatment program and is now one month into a 3-month period of outpatient rehab.  She used IV drugs from 2008 to Aug 2017.  She used cocaine from 2002 to 2008.    Patient is a current smoker since age 9.  She is currently smoking 10 cigs per day.    Patient has a history of alcohol abuse.  She last drank 7 months ago.  She drank heavily for \"a few\" years (1L vodka per day with a 6 pack of high alcohol beer) but significantly reduced her intake (1-2 beers twice per week) until Aug 2017.    Medical hx Surgical hx   Past Medical History:   Diagnosis Date     ADD " (attention deficit disorder)      Asthma      Bipolar disorder, unspecified      Cellulitis 2013     Cervical high risk HPV (human papillomavirus) test positive 12/07/2017     GERD (gastroesophageal reflux disease)      H/O ETOH abuse      H/O intravenous drug use in remission      OCD (obsessive compulsive disorder)      PTSD (post-traumatic stress disorder)       Past Surgical History:   Procedure Laterality Date     HAND SURGERY Right     MRSA abscess drainage     HC TOOTH EXTRACTION W/FORCEP            Medications  Prior to Admission medications    Medication Sig Start Date End Date Taking? Authorizing Provider   cholecalciferol (VITAMIN D3) 5000 UNITS CAPS capsule Take 1 capsule (5,000 Units) by mouth daily 1/9/18  Yes Sandhya Tripp PA-C   senna-docusate (SENOKOT-S;PERICOLACE) 8.6-50 MG per tablet Take 1-2 tablets by mouth 2 times daily as needed for constipation 1/8/18  Yes Sandhya Tripp PA-C   FLUoxetine HCl (PROZAC PO) Take 30 mg by mouth   Yes Reported, Patient   NORTRIPTYLINE HCL PO Take 1 mg/kg/day by mouth   Yes Reported, Patient   ferrous gluconate (FERGON) 324 (38 FE) MG tablet Take 1 tablet (324 mg) by mouth daily (with breakfast) 10/25/17  Yes Millicent Cruz MD   METHADONE HCL PO Take 85 mg by mouth daily    Yes Reported, Patient   triamcinolone (KENALOG) 0.1 % cream Apply sparingly to affected area three times daily as needed 9/22/17  Yes Racquel Rangel MD       Allergies  Allergies   Allergen Reactions     Sulfa Drugs Anaphylaxis     Latex Hives     Nickel Hives     Suboxone      fainting       Family hx Social hx   Family History   Problem Relation Age of Onset     DIABETES Mother      Cervical Cancer Maternal Grandmother      CEREBROVASCULAR DISEASE Maternal Grandfather      Alcoholism Paternal Uncle      Cirrhosis Paternal Uncle      Alcoholism Paternal Uncle      Cirrhosis Paternal Uncle      Migraines Paternal Grandfather      Coronary Artery Disease  "Early Onset Paternal Grandfather 55     Colon Cancer No family hx of       Social History   Substance Use Topics     Smoking status: Current Every Day Smoker     Packs/day: 0.25     Smokeless tobacco: Never Used     Alcohol use No     Lives in [facility] with loreto.  3 children (all in foster care).  Not currently working.  Previously worked as hairdresser.     Review of systems  A 10-point review of systems was negative.    Examination  /77  Pulse 104  Temp 98  F (36.7  C) (Oral)  Ht 1.6 m (5' 3\")  Wt 90.7 kg (200 lb)  SpO2 92%  BMI 35.43 kg/m2  Body mass index is 35.43 kg/(m^2).    Gen- well, NAD, A+Ox3, normal color  Eye- EOMI  ENT- MMM, normal oropharynx  Lym- no palpable lymphadenopathy  CVS- S1, S2 normal, no added sounds, RRR  RS- CTA  Abd- obese, small (?abscess) scars, striae, soft, non-tender, no ascites or organomegaly on palpation or percussion, BS+  Extr- pulses good, no LEYDI  MS- hands normal- no clubbing  Neuro- A+Ox3, no asterixis  Skin- scar R hand, no rash or jaundice  Psych- normal mood    Laboratory  Lab Results   Component Value Date    WBC 6.7 01/08/2018    HGB 14.1 01/08/2018    MCV 85 01/08/2018     01/08/2018     HCV RNA 10/23/17= 32    HCV RNA 9/22/17= 79    HBV SAg 5/25/17 neg  HBV SAb 5/25/17 pos    HCV RNA 10/4/16 negative    Radiology  Nil recent    Assessment  32 year old female who presents for further evaluation and management of unknown genotype, treatment-naive chronic hepatitis C of ~18 months duration.  No clinical or biochemical evidence of cirrhosis.  Will complete serological testing and obtain Fibrosis Scan to evaluate for hepatic fibrosis.  Patient will not require ongoing hepatology follow-up if she achieves SVR12 after antiviral therapy and if she has no evidence of advanced hepatic fibrosis on Fibrosis Scan.    Plan  1.  Check CMP, INR, CBC  2.  Check HCV RNA, HCV GT, HAV Ab, HBV SAg, HBV SAb, HBV CAb  3.  Fibrosis Scan  4.  Follow-up TBD    Von Langston, " MD  Hepatology  [facility]     Addendum 3/9/2018- Fibrosis Scan= F0; HCV RNA= 319; hepatitis B serologies consistent with prior vaccination; HCV GT indeterminate which may be due to low viral load; will re-draw HCV GT- if this remains indeterminate, will treat with 8 weeks of GLEC-PIB.       Universal Safety Interventions

## 2022-01-09 ENCOUNTER — HEALTH MAINTENANCE LETTER (OUTPATIENT)
Age: 37
End: 2022-01-09

## 2022-05-02 NOTE — ANESTHESIA POSTPROCEDURE EVALUATION
Patient: Giselle Mackenzie    Procedure(s):  IRRIGATION AND DEBRIDEMENT RIGHT FOREARM ABSCESS    Diagnosis:UNKNOWN  Diagnosis Additional Information: No value filed.    Anesthesia Type:  MAC    Note:  Anesthesia Post Evaluation    Patient location during evaluation: PACU  Patient participation: Able to fully participate in evaluation  Level of consciousness: awake and alert  Pain management: adequate  Airway patency: patent  Cardiovascular status: acceptable and hemodynamically stable  Respiratory status: acceptable and room air  Hydration status: euvolemic  PONV: none     Anesthetic complications: None          Last vitals:  Vitals:    03/04/19 1145 03/04/19 1215 03/04/19 1315   BP: 95/56 96/45 97/57   Pulse: 76 86 79   Resp: 16 16 16   Temp:      SpO2: 99% 97% 98%         Electronically Signed By: Roel Guevara MD  March 4, 2019  1:37 PM   <-- Click to add NO significant Past Surgical History

## 2022-11-21 ENCOUNTER — HEALTH MAINTENANCE LETTER (OUTPATIENT)
Age: 37
End: 2022-11-21

## 2023-04-16 ENCOUNTER — HEALTH MAINTENANCE LETTER (OUTPATIENT)
Age: 38
End: 2023-04-16

## 2025-01-13 NOTE — PROGRESS NOTES
"Please see \"Imaging\" tab under \"Chart Review\" for details of today's US.    Liza Ha    " All that is needed is fasting cholesterol from my point of view.  May wish to combine with yearly labs for PCP if appropriate.  Thanks

## (undated) DEVICE — PACK MINOR SBA15MIFSE

## (undated) DEVICE — SOL WATER IRRIG 1000ML BOTTLE 2F7114

## (undated) DEVICE — SOL NACL 0.9% IRRIG 1000ML BOTTLE 07138-09

## (undated) DEVICE — PREP CHLORAPREP 26ML TINTED ORANGE  260815

## (undated) DEVICE — DRSG GAUZE 4X4" 3033

## (undated) DEVICE — ESU GROUND PAD UNIVERSAL W/O CORD

## (undated) DEVICE — GLOVE PROTEXIS W/NEU-THERA 7.5  2D73TE75

## (undated) DEVICE — SOL NACL 0.9% INJ 1000ML BAG 2B1324X

## (undated) DEVICE — GLOVE PROTEXIS BLUE W/NEU-THERA 7.5  2D73EB75

## (undated) DEVICE — SYR BULB IRRIG 50ML LATEX FREE 0035280

## (undated) DEVICE — SUCTION TIP YANKAUER STR K87

## (undated) DEVICE — COVER SHOE STERILE

## (undated) DEVICE — LINEN TOWEL PACK X5 5464

## (undated) RX ORDER — GINSENG 100 MG
CAPSULE ORAL
Status: DISPENSED
Start: 2019-03-04

## (undated) RX ORDER — PROPOFOL 10 MG/ML
INJECTION, EMULSION INTRAVENOUS
Status: DISPENSED
Start: 2019-03-04

## (undated) RX ORDER — ACETAMINOPHEN 500 MG
TABLET ORAL
Status: DISPENSED
Start: 2019-03-04

## (undated) RX ORDER — FENTANYL CITRATE 50 UG/ML
INJECTION, SOLUTION INTRAMUSCULAR; INTRAVENOUS
Status: DISPENSED
Start: 2019-03-04

## (undated) RX ORDER — LIDOCAINE HYDROCHLORIDE 10 MG/ML
INJECTION, SOLUTION EPIDURAL; INFILTRATION; INTRACAUDAL; PERINEURAL
Status: DISPENSED
Start: 2019-03-04

## (undated) RX ORDER — CEFAZOLIN SODIUM 2 G/100ML
INJECTION, SOLUTION INTRAVENOUS
Status: DISPENSED
Start: 2019-03-04

## (undated) RX ORDER — BUPIVACAINE HYDROCHLORIDE AND EPINEPHRINE 5; 5 MG/ML; UG/ML
INJECTION, SOLUTION EPIDURAL; INTRACAUDAL; PERINEURAL
Status: DISPENSED
Start: 2019-03-04